# Patient Record
Sex: MALE | Race: WHITE | NOT HISPANIC OR LATINO | Employment: OTHER | URBAN - METROPOLITAN AREA
[De-identification: names, ages, dates, MRNs, and addresses within clinical notes are randomized per-mention and may not be internally consistent; named-entity substitution may affect disease eponyms.]

---

## 2018-01-28 ENCOUNTER — HOSPITAL ENCOUNTER (EMERGENCY)
Facility: HOSPITAL | Age: 38
Discharge: HOME | End: 2018-01-28
Admitting: EMERGENCY MEDICINE
Payer: COMMERCIAL

## 2018-01-28 ENCOUNTER — APPOINTMENT (EMERGENCY)
Dept: CT IMAGING | Facility: HOSPITAL | Age: 38
End: 2018-01-28
Payer: COMMERCIAL

## 2018-01-28 VITALS
HEIGHT: 70 IN | HEART RATE: 89 BPM | DIASTOLIC BLOOD PRESSURE: 71 MMHG | RESPIRATION RATE: 16 BRPM | BODY MASS INDEX: 25.63 KG/M2 | OXYGEN SATURATION: 99 % | TEMPERATURE: 98.7 F | WEIGHT: 179.01 LBS | SYSTOLIC BLOOD PRESSURE: 144 MMHG

## 2018-01-28 DIAGNOSIS — N20.0 RIGHT KIDNEY STONE: Primary | ICD-10-CM

## 2018-01-28 LAB
ALBUMIN SERPL BCP-MCNC: 4.1 G/DL (ref 3.5–5)
ALP SERPL-CCNC: 69 U/L (ref 46–116)
ALT SERPL W P-5'-P-CCNC: 38 U/L (ref 12–78)
ANION GAP SERPL CALCULATED.3IONS-SCNC: 8 MMOL/L (ref 4–13)
APTT PPP: 24 SECONDS (ref 23–35)
AST SERPL W P-5'-P-CCNC: 18 U/L (ref 5–45)
BACTERIA UR QL AUTO: ABNORMAL /HPF
BASOPHILS # BLD AUTO: 0.04 THOUSANDS/ΜL (ref 0–0.1)
BASOPHILS NFR BLD AUTO: 1 % (ref 0–1)
BILIRUB SERPL-MCNC: 0.3 MG/DL (ref 0.2–1)
BILIRUB UR QL STRIP: ABNORMAL
BUN SERPL-MCNC: 14 MG/DL (ref 5–25)
CALCIUM SERPL-MCNC: 9.5 MG/DL (ref 8.3–10.1)
CHLORIDE SERPL-SCNC: 105 MMOL/L (ref 100–108)
CLARITY UR: ABNORMAL
CLARITY, POC: CLEAR
CO2 SERPL-SCNC: 28 MMOL/L (ref 21–32)
COLOR UR: YELLOW
COLOR, POC: NORMAL
CREAT SERPL-MCNC: 1.19 MG/DL (ref 0.6–1.3)
EOSINOPHIL # BLD AUTO: 0.2 THOUSAND/ΜL (ref 0–0.61)
EOSINOPHIL NFR BLD AUTO: 3 % (ref 0–6)
ERYTHROCYTE [DISTWIDTH] IN BLOOD BY AUTOMATED COUNT: 11.8 % (ref 11.6–15.1)
EXT BILIRUBIN, UA: NORMAL
EXT BLOOD URINE: NORMAL
EXT GLUCOSE, UA: NORMAL
EXT KETONES: NORMAL
EXT NITRITE, UA: NORMAL
EXT PH, UA: 5
EXT PROTEIN, UA: NORMAL
EXT SPECIFIC GRAVITY, UA: 1.03
EXT UROBILINOGEN: 0.2
GFR SERPL CREATININE-BSD FRML MDRD: 78 ML/MIN/1.73SQ M
GLUCOSE SERPL-MCNC: 129 MG/DL (ref 65–140)
GLUCOSE UR STRIP-MCNC: NEGATIVE MG/DL
HCT VFR BLD AUTO: 46 % (ref 36.5–49.3)
HGB BLD-MCNC: 15.7 G/DL (ref 12–17)
HGB UR QL STRIP.AUTO: ABNORMAL
INR PPP: 0.96 (ref 0.86–1.16)
KETONES UR STRIP-MCNC: NEGATIVE MG/DL
LEUKOCYTE ESTERASE UR QL STRIP: NEGATIVE
LIPASE SERPL-CCNC: 113 U/L (ref 73–393)
LYMPHOCYTES # BLD AUTO: 1.01 THOUSANDS/ΜL (ref 0.6–4.47)
LYMPHOCYTES NFR BLD AUTO: 16 % (ref 14–44)
MCH RBC QN AUTO: 30.5 PG (ref 26.8–34.3)
MCHC RBC AUTO-ENTMCNC: 34.1 G/DL (ref 31.4–37.4)
MCV RBC AUTO: 89 FL (ref 82–98)
MONOCYTES # BLD AUTO: 0.5 THOUSAND/ΜL (ref 0.17–1.22)
MONOCYTES NFR BLD AUTO: 8 % (ref 4–12)
MUCOUS THREADS UR QL AUTO: ABNORMAL
NEUTROPHILS # BLD AUTO: 4.51 THOUSANDS/ΜL (ref 1.85–7.62)
NEUTS SEG NFR BLD AUTO: 72 % (ref 43–75)
NITRITE UR QL STRIP: NEGATIVE
NON-SQ EPI CELLS URNS QL MICRO: ABNORMAL /HPF
NRBC BLD AUTO-RTO: 0 /100 WBCS
PH UR STRIP.AUTO: 5.5 [PH] (ref 4.5–8)
PLATELET # BLD AUTO: 224 THOUSANDS/UL (ref 149–390)
PMV BLD AUTO: 10 FL (ref 8.9–12.7)
POTASSIUM SERPL-SCNC: 3.6 MMOL/L (ref 3.5–5.3)
PROT SERPL-MCNC: 7.4 G/DL (ref 6.4–8.2)
PROT UR STRIP-MCNC: ABNORMAL MG/DL
PROTHROMBIN TIME: 13 SECONDS (ref 12.1–14.4)
RBC # BLD AUTO: 5.15 MILLION/UL (ref 3.88–5.62)
RBC #/AREA URNS AUTO: ABNORMAL /HPF
SODIUM SERPL-SCNC: 141 MMOL/L (ref 136–145)
SP GR UR STRIP.AUTO: >=1.03 (ref 1–1.03)
UROBILINOGEN UR QL STRIP.AUTO: 0.2 E.U./DL
WBC # BLD AUTO: 6.3 THOUSAND/UL (ref 4.31–10.16)
WBC # BLD EST: NORMAL 10*3/UL
WBC #/AREA URNS AUTO: ABNORMAL /HPF

## 2018-01-28 PROCEDURE — 96374 THER/PROPH/DIAG INJ IV PUSH: CPT

## 2018-01-28 PROCEDURE — 85025 COMPLETE CBC W/AUTO DIFF WBC: CPT

## 2018-01-28 PROCEDURE — 80053 COMPREHEN METABOLIC PANEL: CPT

## 2018-01-28 PROCEDURE — 85610 PROTHROMBIN TIME: CPT | Performed by: PHYSICIAN ASSISTANT

## 2018-01-28 PROCEDURE — 96375 TX/PRO/DX INJ NEW DRUG ADDON: CPT

## 2018-01-28 PROCEDURE — 81002 URINALYSIS NONAUTO W/O SCOPE: CPT

## 2018-01-28 PROCEDURE — 74176 CT ABD & PELVIS W/O CONTRAST: CPT

## 2018-01-28 PROCEDURE — 83690 ASSAY OF LIPASE: CPT

## 2018-01-28 PROCEDURE — 81001 URINALYSIS AUTO W/SCOPE: CPT | Performed by: PHYSICIAN ASSISTANT

## 2018-01-28 PROCEDURE — 36415 COLL VENOUS BLD VENIPUNCTURE: CPT

## 2018-01-28 PROCEDURE — 99284 EMERGENCY DEPT VISIT MOD MDM: CPT

## 2018-01-28 PROCEDURE — 85730 THROMBOPLASTIN TIME PARTIAL: CPT | Performed by: PHYSICIAN ASSISTANT

## 2018-01-28 RX ORDER — KETOROLAC TROMETHAMINE 10 MG/1
10 TABLET, FILM COATED ORAL EVERY 6 HOURS PRN
Qty: 16 TABLET | Refills: 0 | Status: SHIPPED | OUTPATIENT
Start: 2018-01-28 | End: 2018-01-28 | Stop reason: SDUPTHER

## 2018-01-28 RX ORDER — KETOROLAC TROMETHAMINE 30 MG/ML
30 INJECTION, SOLUTION INTRAMUSCULAR; INTRAVENOUS ONCE
Status: COMPLETED | OUTPATIENT
Start: 2018-01-28 | End: 2018-01-28

## 2018-01-28 RX ORDER — TAMSULOSIN HYDROCHLORIDE 0.4 MG/1
0.4 CAPSULE ORAL
Qty: 10 CAPSULE | Refills: 0 | Status: SHIPPED | OUTPATIENT
Start: 2018-01-28 | End: 2021-01-11

## 2018-01-28 RX ORDER — ONDANSETRON 4 MG/1
4 TABLET, FILM COATED ORAL EVERY 6 HOURS
Qty: 12 TABLET | Refills: 0 | Status: SHIPPED | OUTPATIENT
Start: 2018-01-28 | End: 2018-01-28 | Stop reason: SDUPTHER

## 2018-01-28 RX ORDER — OXYCODONE HYDROCHLORIDE AND ACETAMINOPHEN 5; 325 MG/1; MG/1
1 TABLET ORAL EVERY 6 HOURS PRN
Qty: 12 TABLET | Refills: 0 | Status: SHIPPED | OUTPATIENT
Start: 2018-01-28 | End: 2018-01-31

## 2018-01-28 RX ORDER — KETOROLAC TROMETHAMINE 10 MG/1
10 TABLET, FILM COATED ORAL EVERY 6 HOURS PRN
Qty: 16 TABLET | Refills: 0 | Status: SHIPPED | OUTPATIENT
Start: 2018-01-28 | End: 2021-01-11

## 2018-01-28 RX ORDER — TAMSULOSIN HYDROCHLORIDE 0.4 MG/1
0.4 CAPSULE ORAL
Qty: 10 CAPSULE | Refills: 0 | Status: SHIPPED | OUTPATIENT
Start: 2018-01-28 | End: 2018-01-28 | Stop reason: SDUPTHER

## 2018-01-28 RX ORDER — OXYCODONE HYDROCHLORIDE AND ACETAMINOPHEN 5; 325 MG/1; MG/1
1 TABLET ORAL EVERY 6 HOURS PRN
Qty: 10 TABLET | Refills: 0 | Status: SHIPPED | OUTPATIENT
Start: 2018-01-28 | End: 2018-01-28 | Stop reason: SDUPTHER

## 2018-01-28 RX ORDER — ENTACAPONE 200 MG/1
200 TABLET ORAL
COMMUNITY
End: 2021-01-11

## 2018-01-28 RX ORDER — ONDANSETRON 2 MG/ML
4 INJECTION INTRAMUSCULAR; INTRAVENOUS ONCE
Status: COMPLETED | OUTPATIENT
Start: 2018-01-28 | End: 2018-01-28

## 2018-01-28 RX ORDER — HYDROCODONE BITARTRATE AND ACETAMINOPHEN 5; 325 MG/1; MG/1
1 TABLET ORAL ONCE
Status: COMPLETED | OUTPATIENT
Start: 2018-01-28 | End: 2018-01-28

## 2018-01-28 RX ORDER — ONDANSETRON 4 MG/1
4 TABLET, FILM COATED ORAL EVERY 6 HOURS
Qty: 12 TABLET | Refills: 0 | Status: SHIPPED | OUTPATIENT
Start: 2018-01-28 | End: 2021-01-11

## 2018-01-28 RX ADMIN — KETOROLAC TROMETHAMINE 30 MG: 30 INJECTION, SOLUTION INTRAMUSCULAR at 10:13

## 2018-01-28 RX ADMIN — ONDANSETRON 4 MG: 2 INJECTION INTRAMUSCULAR; INTRAVENOUS at 10:14

## 2018-01-28 RX ADMIN — HYDROCODONE BITARTRATE AND ACETAMINOPHEN 1 TABLET: 5; 325 TABLET ORAL at 11:59

## 2018-01-28 NOTE — ED PROVIDER NOTES
History  Chief Complaint   Patient presents with    Abdominal Pain     right lower quadrant pain, sudden onset approx  2 hours ago  Normal bowel movement today, no urinary complaints  Slight nausea after ambulance ride  40year old male with past medical history significant for parkinson's disease presents to ED with chief complaint of abdominal pain  Onset of symptoms reported as 2 hours ago  Location of symptoms is reported as the right lower quadrant of the abdomen  Quality is reported as sharp pain  Severity is reported as moderate  Associated symptoms:  Positive for nausea, denies vomiting, denies diarrhea, denies urinary retention, denies dysuria, hematuria or urinary frequency  Modifiers:  Nothing has been tried for treatment of symptoms  Context: patient reports he is concerned that he may have a kidney stone  He reports he has had kidney stone in the past and symptoms were similar  Patient arrives to ED via EMS, additional information obtained from EMS  Medical summary: review of past visits via [employer] demonstrates no prior visits to this ED  History provided by:  Patient   used: No        Prior to Admission Medications   Prescriptions Last Dose Informant Patient Reported? Taking?   carbidopa-levodopa (SINEMET)  mg per tablet   Yes Yes   Sig: Take 1 tablet by mouth 5 (five) times a day   entacapone (COMTAN) 200 mg tablet   Yes Yes   Sig: Take 200 mg by mouth 5 (five) times a day      Facility-Administered Medications: None       Past Medical History:   Diagnosis Date    Parkinson's disease ([facility] [address] )        History reviewed  No pertinent surgical history  History reviewed  No pertinent family history  I have reviewed and agree with the history as documented      Social History   Substance Use Topics    Smoking status: Current Some Day Smoker     Packs/day: 0 50     Types: Cigarettes    Smokeless tobacco: Never Used    Alcohol use Not on file Comment: friday nights        Review of Systems   Constitutional: Negative for activity change, appetite change, chills, diaphoresis, fatigue, fever and unexpected weight change  HENT: Negative for congestion, dental problem, drooling, ear discharge, ear pain, facial swelling, hearing loss, mouth sores, nosebleeds, postnasal drip, rhinorrhea, sinus pain, sinus pressure, sneezing, sore throat, tinnitus, trouble swallowing and voice change  Eyes: Negative for photophobia, pain, discharge, redness, itching and visual disturbance  Respiratory: Negative for apnea, cough, choking, chest tightness, shortness of breath, wheezing and stridor  Cardiovascular: Negative for chest pain, palpitations and leg swelling  Gastrointestinal: Positive for abdominal pain and nausea  Negative for abdominal distention, anal bleeding, blood in stool, constipation, diarrhea, rectal pain and vomiting  Endocrine: Negative for cold intolerance, heat intolerance, polydipsia, polyphagia and polyuria  Genitourinary: Positive for flank pain  Negative for decreased urine volume, difficulty urinating, discharge, dysuria, frequency, genital sores, hematuria, penile pain, penile swelling, scrotal swelling, testicular pain and urgency  Musculoskeletal: Negative for arthralgias, back pain, gait problem, joint swelling, myalgias, neck pain and neck stiffness  Skin: Negative for color change, pallor, rash and wound  Allergic/Immunologic: Negative for environmental allergies, food allergies and immunocompromised state  Neurological: Negative for dizziness, tremors, seizures, syncope, facial asymmetry, speech difficulty, weakness, light-headedness, numbness and headaches  Hematological: Negative for adenopathy  Does not bruise/bleed easily  Psychiatric/Behavioral: Negative for agitation, confusion and hallucinations  The patient is not nervous/anxious  All other systems reviewed and are negative        Physical Exam  ED Triage Vitals [01/28/18 0937]   Temperature Pulse Respirations Blood Pressure SpO2   98 7 °F (37 1 °C) 80 16 140/92 100 %      Temp Source Heart Rate Source Patient Position - Orthostatic VS BP Location FiO2 (%)   Oral Monitor Lying Right arm --      Pain Score       Worst Possible Pain           Orthostatic Vital Signs  Vitals:    01/28/18 0937 01/28/18 1200   BP: 140/92 144/71   Pulse: 80 89   Patient Position - Orthostatic VS: Lying        Physical Exam   Constitutional: He is oriented to person, place, and time  He appears well-developed and well-nourished  No distress  /71   Pulse 89   Temp 98 7 °F (37 1 °C) (Oral)   Resp 16   Ht 5' 10" (1 778 m)   Wt 81 2 kg (179 lb 0 2 oz)   SpO2 99%   BMI 25 69 kg/m²    HENT:   Head: Normocephalic and atraumatic  Right Ear: External ear normal    Left Ear: External ear normal    Nose: Nose normal    Mouth/Throat: Oropharynx is clear and moist  No oropharyngeal exudate  Eyes: Conjunctivae and EOM are normal  Pupils are equal, round, and reactive to light  Right eye exhibits no discharge  Left eye exhibits no discharge  No scleral icterus  Neck: Normal range of motion  Neck supple  No tracheal deviation present  No thyromegaly present  Cardiovascular: Normal rate, regular rhythm, S1 normal, S2 normal and intact distal pulses  Pulmonary/Chest: Effort normal and breath sounds normal  No stridor  No respiratory distress  He has no wheezes  He has no rales  He exhibits no tenderness  Abdominal: Soft  Bowel sounds are normal  He exhibits no distension and no mass  There is tenderness  There is no rebound and no guarding  No hernia    + RLQ tenderness to palpation  No rigidity or guarding  No pulsatile masses  Musculoskeletal: Normal range of motion  He exhibits no edema, tenderness or deformity  Lymphadenopathy:     He has no cervical adenopathy  Neurological: He is alert and oriented to person, place, and time  He displays normal reflexes   No cranial nerve deficit or sensory deficit  He exhibits normal muscle tone  Coordination normal    Skin: Skin is warm and dry  Capillary refill takes less than 2 seconds  No rash noted  He is not diaphoretic  No erythema  No pallor  Psychiatric: He has a normal mood and affect  His behavior is normal  Judgment and thought content normal    Nursing note and vitals reviewed  ED Medications  Medications   ketorolac (TORADOL) injection 30 mg (30 mg Intravenous Given 1/28/18 1013)   ondansetron (ZOFRAN) injection 4 mg (4 mg Intravenous Given 1/28/18 1014)   HYDROcodone-acetaminophen (NORCO) 5-325 mg per tablet 1 tablet (1 tablet Oral Given 1/28/18 1159)       Diagnostic Studies  Results Reviewed     Procedure Component Value Units Date/Time    Urine Microscopic [[id number]]  (Abnormal) Collected:  01/28/18 1017    Lab Status:  Final result Specimen:  Urine from Urine, Clean Catch Updated:  01/28/18 1033     RBC, UA Innumerable (A) /hpf      WBC, UA 2-4 (A) /hpf      Epithelial Cells None Seen /hpf      Bacteria, UA Occasional /hpf      MUCOUS THREADS Occasional    Protime-INR [[id number]]  (Normal) Collected:  01/28/18 1017    Lab Status:  Final result Specimen:  Blood from Arm, Right Updated:  01/28/18 1030     Protime 13 0 seconds      INR 0 96    APTT [[id number]]  (Normal) Collected:  01/28/18 1017    Lab Status:  Final result Specimen:  Blood from Arm, Right Updated:  01/28/18 1030     PTT 24 seconds     Narrative:          Therapeutic Heparin Range = 60-90 seconds    UA w Reflex to Microscopic w Reflex to Culture [[id number]]  (Abnormal) Collected:  01/28/18 1017    Lab Status:  Final result Specimen:  Urine from Urine, Clean Catch Updated:  01/28/18 1026     Color, UA Yellow     Clarity, UA Cloudy     Specific Gravity, UA >=1 030     pH, UA 5 5     Leukocytes, UA Negative     Nitrite, UA Negative     Protein,  (2+) (A) mg/dl      Glucose, UA Negative mg/dl      Ketones, UA Negative mg/dl      Urobilinogen, UA 0 2 E U /dl      Bilirubin, UA Small (A)     Blood, UA Large (A)    Comprehensive metabolic panel [[id number]] Collected:  01/28/18 0947    Lab Status:  Final result Specimen:  Blood from Arm, Right Updated:  01/28/18 1010     Sodium 141 mmol/L      Potassium 3 6 mmol/L      Chloride 105 mmol/L      CO2 28 mmol/L      Anion Gap 8 mmol/L      BUN 14 mg/dL      Creatinine 1 19 mg/dL      Glucose 129 mg/dL      Calcium 9 5 mg/dL      AST 18 U/L      ALT 38 U/L      Alkaline Phosphatase 69 U/L      Total Protein 7 4 g/dL      Albumin 4 1 g/dL      Total Bilirubin 0 30 mg/dL      eGFR 78 ml/min/1 73sq m     Narrative:         National Kidney Disease Education Program recommendations are as follows:  GFR calculation is accurate only with a steady state creatinine  Chronic Kidney disease less than 60 ml/min/1 73 sq  meters  Kidney failure less than 15 ml/min/1 73 sq  meters      Lipase [[id number]]  (Normal) Collected:  01/28/18 0947    Lab Status:  Final result Specimen:  Blood from Arm, Right Updated:  01/28/18 1010     Lipase 113 u/L     POCT urinalysis dipstick [[id number]]  (Normal) Resulted:  01/28/18 1007    Lab Status:  Final result Specimen:  Urine from Urine, Other Updated:  01/28/18 1008     Color, UA shantell     Clarity, UA clear     EXT Glucose, UA neg     EXT Bilirubin, UA (Ref: Negative) neg     EXT Ketones, UA (Ref: Negative) neg     EXT Spec Grav, UA 1 030     EXT Blood, UA (Ref: Negative) large     EXT pH, UA 5 0     EXT Protein, UA (Ref: Negative) 30+     EXT Urobilinogen, UA (Ref: 0 2- 1 0) 0 2     EXT Leukocytes, UA (Ref: Negative) trace     EXT Nitrite, UA (Ref: Negative) neg    CBC and differential [[id number]]  (Normal) Collected:  01/28/18 0947    Lab Status:  Final result Specimen:  Blood from Arm, Right Updated:  01/28/18 0954     WBC 6 30 Thousand/uL      RBC 5 15 Million/uL      Hemoglobin 15 7 g/dL      Hematocrit 46 0 %      MCV 89 fL      MCH 30 5 pg      MCHC 34 1 g/dL      RDW 11 8 %      MPV 10 0 fL Platelets 210 Thousands/uL      nRBC 0 /100 WBCs      Neutrophils Relative 72 %      Lymphocytes Relative 16 %      Monocytes Relative 8 %      Eosinophils Relative 3 %      Basophils Relative 1 %      Neutrophils Absolute 4 51 Thousands/µL      Lymphocytes Absolute 1 01 Thousands/µL      Monocytes Absolute 0 50 Thousand/µL      Eosinophils Absolute 0 20 Thousand/µL      Basophils Absolute 0 04 Thousands/µL                  CT renal stone study abdomen pelvis without contrast   Final Result by Rommel Trejo MD (01/28 1059)      Mildly obstructing 2 mm calculus at the right ureterovesical junction  Workstation performed: [id number]                    Procedures  Procedures       Phone Contacts  ED Phone Contact    ED Course  ED Course                                MDM  Number of Diagnoses or Management Options  Right kidney stone: new and requires workup  Diagnosis management comments: Differential diagnosis includes but is not limited to appendicitis, diverticulitis, gastroenteritis, gastritis, cholecystitis, pancreatitis, mesenteric adenitis, kidney stone, pyelonephritis, UTI  Plan workup including labs, ct scan abd/pelvis    Lab results reviewed:  inr normal at 0 96   UA remarkable for large blood, negative nitrites, negative leukocytes  Small bilirubin  CBC reviewed remarkable for normal WBC, hgb and hct  cmp reviewed and unremarkable  Lipase normal at 113  Ct renal stone study images visualized by me  Radiology report reviewed: RIGHT KIDNEY AND URETER:  There is a 2 mm calculus in the distal ureter at the uterovesical junction, causing mild hydroureteronephrosis, with minimal perinephric stranding  LEFT KIDNEY AND URETER:  There is a 2 mm calculus in the lower pole of the kidney  No hydronephrosis or hydroureter   No perinephric collection  URINARY BLADDER:  Bladder is decompressed, limiting evaluation   No calculi identified      No significant abnormality in the visualized lung bases  Limited low radiation dose noncontrast CT evaluation demonstrates no clinically significant abnormality of liver, spleen, pancreas, or adrenal glands  No calcified gallstones or gallbladder wall thickening noted  No ascites or lymphadenopathy  Bowel loops appear unremarkable  Limited evaluation demonstrates no evidence to suggest acute appendicitis  There is a small fat-containing umbilical hernia  No acute fracture or destructive osseous lesion is identified  I discussed ct scan results with patient at bedside  Discussed 2 mm Right distal ureteral stone  Discussed with patient there is also a 2mm left stone in lower pole of kidney  Patient reports his pain is improved after treatment in ed  Discussed diagnosis of kidney stone  Patient is able to pass urine in ed  Discussed outpatient follow up with pcp and urologist,  Strain urine, discussed use of toradol and zofran at home  Add percocet for severe pain  Standard narcotic precautions given  Reviewed reasons to return to ed  Patient verbalized understanding of diagnosis and agreement with discharge plan of care as well as understanding of reasons to return to ed            Amount and/or Complexity of Data Reviewed  Clinical lab tests: ordered and reviewed  Tests in the radiology section of CPT®: ordered and reviewed  Discussion of test results with the performing providers: yes  Obtain history from someone other than the patient: yes (Family members at bedside  )  Review and summarize past medical records: yes  Independent visualization of images, tracings, or specimens: yes    Patient Progress  Patient progress: improved    CritCare Time    Disposition  Final diagnoses:   Right kidney stone     Time reflects when diagnosis was documented in both MDM as applicable and the Disposition within this note     Time User Action Codes Description Comment    1/28/2018 11:50 AM Deangelo Gaitan Add [N20 0] Right kidney stone       ED Disposition     ED Disposition Condition Comment    Discharge  Alyssa Dias discharge to home/self care  Condition at discharge: Stable        Follow-up Information     Follow up With Specialties Details Why Contact Info Additional Information    Dolly Acuna MD Family Medicine Call in 1 day for further evaluation of symptoms [address]       Saintclair Lah, MD Urology Call in 2 days for further evaluation of symptoms [address]  [phone]       [address] [facility] Emergency Department Emergency Medicine Go to If symptoms worsen [address] [facility] [address]  [phone] [place] ED, [address] [place] [address], [facility]        Discharge Medication List as of 1/28/2018 11:53 AM      START taking these medications    Details   ketorolac (TORADOL) 10 mg tablet Take 1 tablet (10 mg total) by mouth every 6 (six) hours as needed for moderate pain, Starting Sun 1/28/2018, Normal      ondansetron (ZOFRAN) 4 mg tablet Take 1 tablet (4 mg total) by mouth every 6 (six) hours, Starting Sun 1/28/2018, Normal      oxyCODONE-acetaminophen (PERCOCET) 5-325 mg per tablet Take 1 tablet by mouth every 6 (six) hours as needed for moderate pain (kidney stone/initial rx) for up to 10 days Max Daily Amount: 4 tablets, Starting Sun 1/28/2018, Until Wed 2/7/2018, Print      tamsulosin (FLOMAX) 0 4 mg Take 1 capsule (0 4 mg total) by mouth daily with dinner for 10 days, Starting Sun 1/28/2018, Until Wed 2/7/2018, Normal         CONTINUE these medications which have NOT CHANGED    Details   carbidopa-levodopa (SINEMET)  mg per tablet Take 1 tablet by mouth 5 (five) times a day, Historical Med      entacapone (COMTAN) 200 mg tablet Take 200 mg by mouth 5 (five) times a day, Historical Med           No discharge procedures on file      ED Provider  Electronically Signed by           Jos Ervin PA-C  01/31/18 5482

## 2018-01-28 NOTE — DISCHARGE INSTRUCTIONS
Kidney Stones   WHAT YOU NEED TO KNOW:   Kidney stones form in the urinary system when the water and waste in your urine are out of balance  When this happens, certain types of waste crystals separate from the urine  The crystals build up and form kidney stones  You may have 1 or more kidney stones  DISCHARGE INSTRUCTIONS:   Return to the emergency department if:   · You have vomiting that is not relieved by medicine  Contact your healthcare provider if:   · You have a fever  · You have trouble passing urine  · You see blood in your urine  · You have severe pain  · You have any questions or concerns about your condition or care  Medicines:   · NSAIDs , such as ibuprofen, help decrease swelling, pain, and fever  This medicine is available with or without a doctor's order  NSAIDs can cause stomach bleeding or kidney problems in certain people  If you take blood thinner medicine, always ask your healthcare provider if NSAIDs are safe for you  Always read the medicine label and follow directions  · Prescription medicine  may be given  Ask how to take this medicine safely  · Medicines  to balance your electrolytes may be needed  · Take your medicine as directed  Contact your healthcare provider if you think your medicine is not helping or if you have side effects  Tell him or her if you are allergic to any medicine  Keep a list of the medicines, vitamins, and herbs you take  Include the amounts, and when and why you take them  Bring the list or the pill bottles to follow-up visits  Carry your medicine list with you in case of an emergency  Follow up with your healthcare provider as directed: You may need to return for more tests  Write down your questions so you remember to ask them during your visits  Self-care:   · Drink plenty of liquids  Your healthcare provider may tell you to drink at least 8 to 12 (eight-ounce) cups of liquids each day   This helps flush out the kidney stones when you urinate  Water is the best liquid to drink  · Strain your urine every time you go to the bathroom  Urinate through a strainer or a piece of thin cloth to catch the stones  Take the stones to your healthcare provider so they can be sent to the lab for tests  This will help your healthcare providers plan the best treatment for you  · Eat a variety of healthy foods  Healthy foods include fruits, vegetables, whole-grain breads, low-fat dairy products, beans, and fish  You may need to limit how much sodium (salt) or protein you eat  Ask for information about the best foods for you  · Stay active  Your stones may pass more easily by if you stay active  Ask about the best activities for you  After you pass your kidney stones:  Once you have passed your kidney stones, your healthcare provider may  order a 24-hour urine test  Results from a 24-hour urine test will help your healthcare provider plan ways to prevent more stones from forming  If you are told to do a 24-hour test, your healthcare provider will give you more instructions  © 2017 [address] [facility] Information is for End User's use only and may not be sold, redistributed or otherwise used for commercial purposes  All illustrations and images included in CareNotes® are the copyrighted property of A D A M , Inc  or Alexander Kamran  The above information is an  only  It is not intended as medical advice for individual conditions or treatments  Talk to your doctor, nurse or pharmacist before following any medical regimen to see if it is safe and effective for you  How to Strain Your Urine   WHAT YOU NEED TO KNOW:   Urinate into a strainer (funnel with a fine mesh on the bottom) or glass jar to collect kidney stones  DISCHARGE INSTRUCTIONS:   Medicines:   · Pain medicine: You may be given medicine to take away or decrease pain   Do not wait until the pain is severe before you take your medicine  · NSAIDs:  These medicines decrease swelling, pain, and fever  NSAIDs are available without a doctor's order  Ask which medicine is right for you  Ask how much to take and when to take it  Take as directed  NSAIDs can cause stomach bleeding and kidney problems if not taken correctly  · Nausea medicine: This medicine calms your stomach and prevents or controls vomiting  · Take your medicine as directed  Contact your healthcare provider if you think your medicine is not helping or if you have side effects  Tell him of her if you are allergic to any medicine  Keep a list of the medicines, vitamins, and herbs you take  Include the amounts, and when and why you take them  Bring the list or the pill bottles to follow-up visits  Carry your medicine list with you in case of an emergency  Drink liquids as directed:  Drink about 3 liters of liquids each day, or as directed  That equals about 12 glasses of water or fruit juice  Half of your total daily liquids should be water  Limit coffee, tea, and soda to 2 cups daily  Your urine will be pale and clear if you are drinking enough liquid  Self-care:   · Activity:  Exercise, such as walking, may help decrease your pain  · Avoid heat:  Heat may cause you to sweat, urinate less, and become dehydrated  Follow up with your healthcare provider or urologist as directed:  Write down your questions so you remember to ask them during your visits  Contact your healthcare provider or urologist if:   · You have a fever and chills  · Your urine looks cloudy or has a bad smell  · You have burning pain when you urinate  · You have trouble urinating  · You are vomiting and it does not get better, even after you take medicine  · You have questions or concerns about your condition or care  Return to the emergency department if:   · You are not able to urinate  · You have severe pain in your lower abdomen or side      · Your heart flutters or beats faster than usual   © 2017 [address] [facility] Information is for End User's use only and may not be sold, redistributed or otherwise used for commercial purposes  All illustrations and images included in CareNotes® are the copyrighted property of A D A M , Inc  or Alexander Andrew  The above information is an  only  It is not intended as medical advice for individual conditions or treatments  Talk to your doctor, nurse or pharmacist before following any medical regimen to see if it is safe and effective for you

## 2018-01-28 NOTE — ED NOTES
Patient verbalizes understanding of DCI and followup care  Ambulatory off unit without assistance  Family driving home        Marcos Manjarrez RN  01/28/18 4567

## 2021-01-11 ENCOUNTER — OFFICE VISIT (OUTPATIENT)
Dept: FAMILY MEDICINE CLINIC | Facility: CLINIC | Age: 41
End: 2021-01-11
Payer: COMMERCIAL

## 2021-01-11 VITALS
SYSTOLIC BLOOD PRESSURE: 118 MMHG | DIASTOLIC BLOOD PRESSURE: 76 MMHG | WEIGHT: 184.2 LBS | BODY MASS INDEX: 26.37 KG/M2 | TEMPERATURE: 97.8 F | HEIGHT: 70 IN | OXYGEN SATURATION: 99 % | HEART RATE: 110 BPM

## 2021-01-11 DIAGNOSIS — F17.200 CURRENT EVERY DAY SMOKER: ICD-10-CM

## 2021-01-11 DIAGNOSIS — Z13.220 SCREENING, LIPID: ICD-10-CM

## 2021-01-11 DIAGNOSIS — L98.9 SKIN LESION: ICD-10-CM

## 2021-01-11 DIAGNOSIS — G20 PARKINSON'S DISEASE (HCC): ICD-10-CM

## 2021-01-11 DIAGNOSIS — R53.83 OTHER FATIGUE: ICD-10-CM

## 2021-01-11 DIAGNOSIS — Z00.00 ENCOUNTER FOR MEDICAL EXAMINATION TO ESTABLISH CARE: ICD-10-CM

## 2021-01-11 DIAGNOSIS — Z11.4 SCREENING FOR HIV (HUMAN IMMUNODEFICIENCY VIRUS): ICD-10-CM

## 2021-01-11 PROBLEM — G20.A1 PARKINSON'S DISEASE: Status: ACTIVE | Noted: 2021-01-11

## 2021-01-11 PROCEDURE — 3008F BODY MASS INDEX DOCD: CPT | Performed by: FAMILY MEDICINE

## 2021-01-11 PROCEDURE — 99204 OFFICE O/P NEW MOD 45 MIN: CPT | Performed by: FAMILY MEDICINE

## 2021-01-11 PROCEDURE — 3725F SCREEN DEPRESSION PERFORMED: CPT | Performed by: FAMILY MEDICINE

## 2021-01-11 RX ORDER — CARBIDOPA, LEVODOPA AND ENTACAPONE 50; 200; 200 MG/1; MG/1; MG/1
1 TABLET, FILM COATED ORAL
COMMUNITY
Start: 2020-12-10 | End: 2021-03-22 | Stop reason: RX

## 2021-01-11 RX ORDER — HYDROXYZINE 50 MG/1
50 TABLET, FILM COATED ORAL EVERY EVENING
COMMUNITY
Start: 2021-01-06

## 2021-01-11 NOTE — PROGRESS NOTES
Assessment/Plan:     Chronic Problems:  Park[facility]'s disease ([facility] ) - early onset   Advised to keep the follow-up with his neurologist  Stay on current meds  Current every day smoker  Pt is not ready to work on smoking cessation, but will let me know  Plans on stopping this year, maybe by June per pt  Also refuses flu shot and pneumonia shot  Visit Diagnosis:  Diagnoses and all orders for this visit:    BMI 26 0-26 9,adult    Encounter for medical examination to establish care  Comments: Will get labs and discuss at f/u appt  Parkinson's disease ([facility] ) - early onset    Current every day smoker    Skin lesion  Comments: Will arrange removal as pt states this lesion has bled on and off since September  No further picking  Screening, lipid  -     Comprehensive metabolic panel; Future  -     Lipid panel; Future  -     Microalbumin / creatinine urine ratio  -     Urinalysis with microscopic    Other fatigue  -     TSH, 3rd generation with Free T4 reflex; Future  -     CBC and differential; Future    Screening for HIV (human immunodeficiency virus)  Comments:  Pt accepted screening for hiv  Orders:  -     HIV 1/2 Antigen/Antibody (4th Generation) w Reflex [facility]; Future    Other orders  -     hydrOXYzine HCL (ATARAX) 50 mg tablet; Take 50 mg by mouth every evening  -     carbidopa-levodopa-entacapone (STALEVO) -200 MG per tablet; 1 tablet 5 (five) times a day          Subjective:    Patient ID: Akhil Perez  is a 45 y o  male  Patient is here to today to establish care  He was known to me from another practice  He has not had a primary for at least the last 4 years  He does follow up consistently with his neurologist, Dr Jin Rajan, from [facility] for early onset Parkinson  Feels he is doing well, just on  A lot of meds  Now on Stalevo  Feels he is doing well on this drug, just tired a lot  Was on provigil but that is not helping and the meds are not covered   Takes atarax to help him sleep at night or he would be up every hour  Pt also would like me to look at a lesion on his right calf that he has had since September  Feels the lesion has changed since September  Sometimes this lesion breaks open and will return and heal, but will come back  Also gets lesions on the fingers like this but they heal and resolve  Takes all other meds as directed  No side effects noted  The following portions of the patient's history were reviewed and updated as appropriate: allergies, current medications, past family history, past medical history, past social history, past surgical history and problem list     Review of Systems   Constitutional: Positive for fatigue  Negative for chills, diaphoresis and fever  HENT: Negative  Eyes: Negative  Respiratory: Negative for cough, shortness of breath and wheezing  Pt is still a smoker about 1/2 ppd  Cardiovascular: Negative for chest pain and palpitations  Gastrointestinal: Positive for constipation  Negative for abdominal pain, diarrhea, nausea and vomiting  Abdominal distention: Has stiffness from the Parkinsons  Genitourinary: Positive for urgency (but has had a h/o kidney stones  Last episode July 2018)  Negative for dysuria and frequency  Musculoskeletal: Positive for gait problem (at times  )  Negative for arthralgias and myalgias  Neurological: Positive for tremors (Pt reports resting tremors  ) and headaches  Negative for dizziness and light-headedness  Psychiatric/Behavioral: Positive for sleep disturbance (yes unless he takes atarax  )  Negative for dysphoric mood  The patient is not nervous/anxious            /76   Pulse (!) 110   Temp 97 8 °F (36 6 °C)   Ht 5' 10" (1 778 m)   Wt 83 6 kg (184 lb 3 2 oz)   SpO2 99%   BMI 26 43 kg/m²   Social History     Socioeconomic History    Marital status: /Civil Union     Spouse name: Not on file    Number of children: Not on file    Years of education: Not on file    Highest education level: Not on file   Occupational History    Not on file   Social Needs    Financial resource strain: Not on file    Food insecurity     Worry: Not on file     Inability: Not on file    Transportation needs     Medical: Not on file     Non-medical: Not on file   Tobacco Use    Smoking status: Current Some Day Smoker     Packs/day: 0 50     Types: Cigarettes    Smokeless tobacco: Never Used   Substance and Sexual Activity    Alcohol use: Not on file     Comment: friday nights    Drug use: No    Sexual activity: Not on file   Lifestyle    Physical activity     Days per week: Not on file     Minutes per session: Not on file    Stress: Not on file   Relationships    Social connections     Talks on phone: Not on file     Gets together: Not on file     Attends [employer] service: Not on file     Active member of club or organization: Not on file     Attends meetings of clubs or organizations: Not on file     Relationship status: Not on file    Intimate partner violence     Fear of current or ex partner: Not on file     Emotionally abused: Not on file     Physically abused: Not on file     Forced sexual activity: Not on file   Other Topics Concern    Not on file   Social History Narrative    Not on file     Past Medical History:   Diagnosis Date    Parkinson's disease [facility])      History reviewed  No pertinent family history  History reviewed  No pertinent surgical history  Current Outpatient Medications:     carbidopa-levodopa-entacapone (STALEVO) -200 MG per tablet, 1 tablet 5 (five) times a day, Disp: , Rfl:     hydrOXYzine HCL (ATARAX) 50 mg tablet, Take 50 mg by mouth every evening, Disp: , Rfl:     No Known Allergies       Lab Review   No visits with results within 2 Month(s) from this visit     Latest known visit with results is:   Admission on 01/28/2018, Discharged on 01/28/2018   Component Date Value    WBC 01/28/2018 6 30     RBC 01/28/2018 5 15     Hemoglobin 01/28/2018 15 7     Hematocrit 01/28/2018 46 0     MCV 01/28/2018 89     MCH 01/28/2018 30 5     MCHC 01/28/2018 34 1     RDW 01/28/2018 11 8     MPV 01/28/2018 10 0     Platelets 57/82/3919 224     nRBC 01/28/2018 0     Neutrophils Relative 01/28/2018 72     Lymphocytes Relative 01/28/2018 16     Monocytes Relative 01/28/2018 8     Eosinophils Relative 01/28/2018 3     Basophils Relative 01/28/2018 1     Neutrophils Absolute 01/28/2018 4 51     Lymphocytes Absolute 01/28/2018 1 01     Monocytes Absolute 01/28/2018 0 50     Eosinophils Absolute 01/28/2018 0 20     Basophils Absolute 01/28/2018 0 04     Sodium 01/28/2018 141     Potassium 01/28/2018 3 6     Chloride 01/28/2018 105     CO2 01/28/2018 28     ANION GAP 01/28/2018 8     BUN 01/28/2018 14     Creatinine 01/28/2018 1 19     Glucose 01/28/2018 129     Calcium 01/28/2018 9 5     AST 01/28/2018 18     ALT 01/28/2018 38     Alkaline Phosphatase 01/28/2018 69     Total Protein 01/28/2018 7 4     Albumin 01/28/2018 4 1     Total Bilirubin 01/28/2018 0 30     eGFR 01/28/2018 78     Lipase 01/28/2018 113     Color, UA 01/28/2018 shantell     Clarity, UA 01/28/2018 clear     Glucose, UA (Ref: Negati* 01/28/2018 neg     Bilirubin, UA (Ref: Nega* 01/28/2018 neg     Ketones, UA (Ref: Negati* 01/28/2018 neg     Spec Grav, UA (Ref:1 003* 01/28/2018 1 030     Blood, UA (Ref: Negative) 01/28/2018 large     pH, UA (Ref: 4 5-8 0) 01/28/2018 5 0     Protein, UA (Ref: Negati* 01/28/2018 30+     Urobilinogen, UA (Ref: 0* 01/28/2018 0 2      Leukocytes, UA (Ref: Ne* 01/28/2018 trace     Nitrite, UA (Ref: Negati* 01/28/2018 neg     Protime 01/28/2018 13 0     INR 01/28/2018 0 96     PTT 01/28/2018 24     Color, UA 01/28/2018 Yellow     Clarity, UA 01/28/2018 Cloudy     Specific Gravity, UA 01/28/2018 >=1 030     pH, UA 01/28/2018 5 5     Leukocytes, UA 01/28/2018 Negative     Nitrite, UA 01/28/2018 Negative     Protein, UA 01/28/2018 100 (2+)*    Glucose, UA 01/28/2018 Negative     Ketones, UA 01/28/2018 Negative     Urobilinogen, UA 01/28/2018 0 2     Bilirubin, UA 01/28/2018 Small*    Blood, UA 01/28/2018 Large*    RBC, UA 01/28/2018 Innumerable*    WBC, UA 01/28/2018 2-4*    Epithelial Cells 01/28/2018 None Seen     Bacteria, UA 01/28/2018 Occasional     MUCUS THREADS 01/28/2018 Occasional         Imaging: No results found  Objective:     Physical Exam  Vitals signs and nursing note reviewed  Constitutional:       General: He is not in acute distress  Appearance: Normal appearance  He is well-developed  He is not ill-appearing, toxic-appearing or diaphoretic  HENT:      Head: Normocephalic and atraumatic  Right Ear: Tympanic membrane, ear canal and external ear normal       Left Ear: Tympanic membrane, ear canal and external ear normal       Nose: Nose normal  No rhinorrhea  Mouth/Throat:      Mouth: Mucous membranes are moist       Pharynx: No posterior oropharyngeal erythema  Eyes:      General:         Right eye: No discharge  Left eye: No discharge  Conjunctiva/sclera: Conjunctivae normal       Pupils: Pupils are equal, round, and reactive to light  Neck:      Musculoskeletal: Normal range of motion  Thyroid: No thyromegaly  Cardiovascular:      Rate and Rhythm: Normal rate and regular rhythm  Heart sounds: Normal heart sounds  No murmur  Pulmonary:      Effort: Pulmonary effort is normal  No respiratory distress  Breath sounds: No wheezing or rales  Abdominal:      General: Bowel sounds are normal       Palpations: Abdomen is soft  Tenderness: There is no abdominal tenderness  Musculoskeletal: Normal range of motion  General: No tenderness or deformity  Lymphadenopathy:      Cervical: No cervical adenopathy  Skin:     General: Skin is warm and dry        Comments: 3 mm lesion on the right calf raised which appears to have a white head  Pt states he did pick this in the shower  , but this lesion has bled on and off since September  Neurological:      Mental Status: He is alert and oriented to person, place, and time  Cranial Nerves: No cranial nerve deficit  Comments: + tremor noted, resting and intentional, but very mild  Psychiatric:         Behavior: Behavior normal          Thought Content: Thought content normal          Judgment: Judgment normal            Patient Instructions      Reviewed all with patient  Keep the follow-up with your neurologist   Have the labs done fasting but drink water before you go for the test   If the new labs are normal we will discuss all at follow-up appointment in about 2 weeks when I will do your Medicare wellness and also remove that lesion from your right calf  I would like you to consider stopping smoking and may be at this point you could cut down by 1 cigarette but your wife is the problem  It is very difficult for 1 person to stop smoking if the partners smoking as well  Will discuss that with her as well  Patient is refusing the flu shot the pneumonia shot and prefers not to get the COVID vaccine  Low Fat Diet   AMBULATORY CARE:   A low-fat diet  is an eating plan that is low in total fat, unhealthy fat, and cholesterol  You may need to follow a low-fat diet if you have trouble digesting or absorbing fat  You may also need to follow this diet if you have high cholesterol  You can also lower your cholesterol by increasing the amount of fiber in your diet  Soluble fiber is a type of fiber that helps to decrease cholesterol levels  Different types of fat in food:   · Limit unhealthy fats  A diet that is high in cholesterol, saturated fat, and trans fat may cause unhealthy cholesterol levels  Unhealthy cholesterol levels increase your risk of heart disease  ? Cholesterol:  Limit intake of cholesterol to less than 200 mg per day   Cholesterol is found in meat, eggs, and dairy     ? Saturated fat:  Limit saturated fat to less than 7% of your total daily calories  Ask your dietitian how many calories you need each day  Saturated fat is found in butter, cheese, ice cream, whole milk, and palm oil  Saturated fat is also found in meat, such as beef, pork, chicken skin, and processed meats  Processed meats include sausage, hot dogs, and bologna  ? Trans fat:  Avoid trans fat as much as possible  Trans fat is used in fried and baked foods  Foods that say trans fat free on the label may still have up to 0 5 grams of trans fat per serving  · Include healthy fats  Replace foods that are high in saturated and trans fat with foods high in healthy fats  This may help to decrease high cholesterol levels  ? Monounsaturated fats: These are found in avocados, nuts, and vegetable oils, such as olive, canola, and sunflower oil  ? Polyunsaturated fats: These can be found in vegetable oils, such as soybean or corn oil  Omega-3 fats can help to decrease the risk of heart disease  Omega-3 fats are found in fish, such as salmon, herring, trout, and tuna  Omega-3 fats can also be found in plant foods, such as walnuts, flaxseed, soybeans, and canola oil  Foods to limit or avoid:   · Grains:      ? Snacks that are made with partially hydrogenated oils, such as chips, regular crackers, and butter-flavored popcorn    ? High-fat baked goods, such as biscuits, croissants, doughnuts, pies, cookies, and pastries    · Dairy:      ? Whole milk, 2% milk, and yogurt and ice cream made with whole milk    ? Half and half creamer, heavy cream, and whipping cream    ? Cheese, cream cheese, and sour cream    · Meats and proteins:      ? High-fat cuts of meat (T-bone steak, regular hamburger, and ribs)    ? Fried meat, poultry (turkey and chicken), and fish    ? Poultry (chicken and turkey) with skin    ? Cold cuts (salami or bologna), hot dogs, schmitz, and sausage    ?  Whole eggs and egg yolks    · Vegetables and fruits with added fat:      ? Fried vegetables or vegetables in butter or high-fat sauces, such as cream or cheese sauces    ? Fried fruit or fruit served with butter or cream    · Fats:      ? Butter, stick margarine, and shortening    ? Coconut, palm oil, and palm kernel oil    Foods to include:   · Grains:      ? Whole-grain breads, cereals, pasta, and brown rice    ? Low-fat crackers and pretzels    · Vegetables and fruits:      ? Fresh, frozen, or canned vegetables (no salt or low-sodium)    ? Fresh, frozen, dried, or canned fruit (canned in light syrup or fruit juice)    ? Avocado    · Low-fat dairy products:      ? Nonfat (skim) or 1% milk    ? Nonfat or low-fat cheese, yogurt, and cottage cheese    · Meats and proteins:      ? Chicken or turkey with no skin    ? Baked or broiled fish    ? Lean beef and pork (loin, round, extra lean hamburger)    ? Beans and peas, unsalted nuts, soy products    ? Egg whites and substitutes    ? Seeds and nuts    · Fats:      ? Unsaturated oil, such as canola, olive, peanut, soybean, or sunflower oil    ? Soft or liquid margarine and vegetable oil spread    ? Low-fat salad dressing    Other ways to decrease fat:   · Read food labels before you buy foods  Choose foods that have less than 30% of calories from fat  Choose low-fat or fat-free dairy products  Remember that fat free does not mean calorie free  These foods still contain calories, and too many calories can lead to weight gain  · Trim fat from meat and avoid fried food  Trim all visible fat from meat before you cook it  Remove the skin from poultry  Do not shearer meat, fish, or poultry  Bake, roast, boil, or broil these foods instead  Avoid fried foods  Eat a baked potato instead of [place] fries  Steam vegetables instead of sautéing them in butter  · Add less fat to foods  Use imitation schmitz bits on salads and baked potatoes instead of regular schmitz bits   Use fat-free or low-fat salad dressings instead of regular dressings  Use low-fat or nonfat butter-flavored topping instead of regular butter or margarine on popcorn and other foods  Ways to decrease fat in recipes:  Replace high-fat ingredients with low-fat or nonfat ones  This may cause baked goods to be drier than usual  You may need to use nonfat cooking spray on pans to prevent food from sticking  You also may need to change the amount of other ingredients, such as water, in the recipe  Try the following:  · Use low-fat or light margarine instead of regular margarine or shortening  · Use lean ground turkey breast or chicken, or lean ground beef (less than 5% fat) instead of hamburger  · Add 1 teaspoon of canola oil to 8 ounces of skim milk instead of using cream or half and half  · Use grated zucchini, carrots, or apples in breads instead of coconut  · Use blenderized, low-fat cottage cheese, plain tofu, or low-fat ricotta cheese instead of cream cheese  · Use 1 egg white and 1 teaspoon of canola oil, or use ¼ cup (2 ounces) of fat-free egg substitute instead of a whole egg  · Replace half of the oil that is called for in a recipe with applesauce when you bake  Use 3 tablespoons of cocoa powder and 1 tablespoon of canola oil instead of a square of baking chocolate  How to increase fiber:  Eat enough high-fiber foods to get 20 to 30 grams of fiber every day  Slowly increase your fiber intake to avoid stomach cramps, gas, and other problems  · Eat 3 ounces of whole-grain foods each day  An ounce is about 1 slice of bread  Eat whole-grain breads, such as whole-wheat bread  Whole wheat, whole-wheat flour, or other whole grains should be listed as the first ingredient on the food label  Replace white flour with whole-grain flour or use half of each in recipes  Whole-grain flour is heavier than white flour, so you may have to add more yeast or baking powder       · Eat a high-fiber cereal for breakfast   Marco A Cano is a good source of soluble fiber  Look for cereals that have bran or fiber in the name  Choose whole-grain products, such as brown rice, barley, and whole-wheat pasta  · Eat more beans, peas, and lentils  For example, add beans to soups or salads  Eat at least 5 cups of fruits and vegetables each day  Eat fruits and vegetables with the peel because the peel is high in fiber  © Copyright [address] Information is for End User's use only and may not be sold, redistributed or otherwise used for commercial purposes  All illustrations and images included in CareNotes® are the copyrighted property of A D A M , Inc  or [address] [place] [address]  The above information is an  only  It is not intended as medical advice for individual conditions or treatments  Talk to your doctor, nurse or pharmacist before following any medical regimen to see if it is safe and effective for you  Heart Healthy Diet   AMBULATORY CARE:   A heart healthy diet  is an eating plan low in unhealthy fats and sodium (salt)  The plan is high in healthy fats and fiber  A heart healthy diet helps improve your cholesterol levels and lowers your risk for heart disease and stroke  A dietitian will teach you how to read and understand food labels  Heart healthy diet guidelines to follow:   · Choose foods that contain healthy fats  ? Unsaturated fats  include monounsaturated and polyunsaturated fats  Unsaturated fat is found in foods such as soybean, canola, olive, corn, and safflower oils  It is also found in soft tub margarine that is made with liquid vegetable oil  ? Omega-3 fat  is found in certain fish, such as salmon, tuna, and trout, and in walnuts and flaxseed  Eat fish high in omega-3 fats at least 2 times a week  · Get 20 to 30 grams of fiber each day  Fruits, vegetables, whole-grain foods, and legumes (cooked beans) are good sources of fiber  · Limit or do not have unhealthy fats  ?  Cholesterol is found in animal foods, such as eggs and lobster, and in dairy products made from whole milk  Limit cholesterol to less than 200 mg each day  ? Saturated fat  is found in meats, such as schmitz and hamburger  It is also found in chicken or turkey skin, whole milk, and butter  Limit saturated fat to less than 7% of your total daily calories  ? Trans fat  is found in packaged foods, such as potato chips and cookies  It is also in hard margarine, some fried foods, and shortening  Do not eat foods that contain trans fats  · Limit sodium as directed  You may be told to limit sodium to 2,000 to 2,300 mg each day  Choose low-sodium or no-salt-added foods  Add little or no salt to food you prepare  Use herbs and spices in place of salt  Include the following in your heart healthy plan:  Ask your dietitian or healthcare provider how many servings to have from each of the following food groups:  · Grains:      ? Whole-wheat breads, cereals, and pastas, and brown rice    ? Low-fat, low-sodium crackers and chips    · Vegetables:      ? Broccoli, green beans, green peas, and spinach    ? Collards, kale, and lima beans    ? Carrots, sweet potatoes, tomatoes, and peppers    ? Canned vegetables with no salt added    · Fruits:      ? Bananas, peaches, pears, and pineapple    ? Grapes, raisins, and dates    ? Oranges, tangerines, grapefruit, orange juice, and grapefruit juice    ? Apricots, mangoes, melons, and papaya    ? Raspberries and strawberries    ? Canned fruit with no added sugar    · Low-fat dairy:      ? Nonfat (skim) milk, 1% milk, and low-fat almond, cashew, or soy milks fortified with calcium    ? Low-fat cheese, regular or frozen yogurt, and cottage cheese    · Meats and proteins:      ? Lean cuts of beef and pork (loin, leg, round), skinless chicken and turkey    ?  Legumes, soy products, egg whites, or nuts    Limit or do not include the following in your heart healthy plan:   · Unhealthy fats and oils:      ? Whole or 2% milk, cream cheese, sour cream, or cheese    ? High-fat cuts of beef (T-bone steaks, ribs), chicken or turkey with skin, and organ meats such as liver    ? Butter, stick margarine, shortening, and cooking oils such as coconut or palm oil    · Foods and liquids high in sodium:      ? Packaged foods, such as frozen dinners, cookies, macaroni and cheese, and cereals with more than 300 mg of sodium per serving    ? Vegetables with added sodium, such as instant potatoes, vegetables with added sauces, or regular canned vegetables    ? Cured or smoked meats, such as hot dogs, schmitz, and sausage    ? High-sodium ketchup, barbecue sauce, salad dressing, pickles, olives, soy sauce, or miso    · Foods and liquids high in sugar:      ? Candy, cake, cookies, pies, or doughnuts    ? Soft drinks (soda), sports drinks, or sweetened tea    ? Canned or dry mixes for cakes, soups, sauces, or gravies    Other healthy heart guidelines:   · Do not smoke  Nicotine and other chemicals in cigarettes and cigars can cause lung and heart damage  Ask your healthcare provider for information if you currently smoke and need help to quit  E-cigarettes or smokeless tobacco still contain nicotine  Talk to your healthcare provider before you use these products  · Limit or do not drink alcohol as directed  Alcohol can damage your heart and raise your blood pressure  Your healthcare provider may give you specific daily and weekly limits  The general recommended limit is 1 drink a day for women 21 or older and for men 72 or older  Do not have more than 3 drinks in a day or 7 in a week  The recommended limit is 2 drinks a day for men 24to 59years of age  Do not have more than 4 drinks in a day or 14 in a week  A drink of alcohol is 12 ounces of beer, 5 ounces of wine, or 1½ ounces of liquor  · Exercise regularly  Exercise can help you maintain a healthy weight and improve your blood pressure and cholesterol levels  Regular exercise can also decrease your risk for heart problems  Ask your healthcare provider about the best exercise plan for you  Do not start an exercise program without asking your healthcare provider  Follow up with your doctor or cardiologist as directed:  Write down your questions so you remember to ask them during your visits  © Copyright [address] Information is for End User's use only and may not be sold, redistributed or otherwise used for commercial purposes  All illustrations and images included in CareNotes® are the copyrighted property of A D A M , Inc  or [address] [place] [address]  The above information is an  only  It is not intended as medical advice for individual conditions or treatments  Talk to your doctor, nurse or pharmacist before following any medical regimen to see if it is safe and effective for you  LESLIE Linares    Portions of the record may have been created with voice recognition software  Occasional wrong word or "sound a like" substitutions may have occurred due to the inherent limitations of voice recognition software  Read the chart carefully and recognize, using context, where substitutions have occurred  BMI Counseling: Body mass index is 26 43 kg/m²  The BMI is above normal  Nutrition recommendations include reducing portion sizes, decreasing overall calorie intake, 3-5 servings of fruits/vegetables daily, reducing fast food intake, consuming healthier snacks, decreasing soda and/or juice intake, moderation in carbohydrate intake, increasing intake of lean protein, reducing intake of saturated fat and trans fat and reducing intake of cholesterol  Exercise recommendations include moderate aerobic physical activity for 150 minutes/week

## 2021-01-11 NOTE — ASSESSMENT & PLAN NOTE
Pt is not ready to work on smoking cessation, but will let me know  Plans on stopping this year, maybe by June per pt  Also refuses flu shot and pneumonia shot

## 2021-01-11 NOTE — PATIENT INSTRUCTIONS
Reviewed all with patient  Keep the follow-up with your neurologist   Have the labs done fasting but drink water before you go for the test   If the new labs are normal we will discuss all at follow-up appointment in about 2 weeks when I will do your Medicare wellness and also remove that lesion from your right calf  I would like you to consider stopping smoking and may be at this point you could cut down by 1 cigarette but your wife is the problem  It is very difficult for 1 person to stop smoking if the partners smoking as well  Will discuss that with her as well  Patient is refusing the flu shot the pneumonia shot and prefers not to get the COVID vaccine  Low Fat Diet   AMBULATORY CARE:   A low-fat diet  is an eating plan that is low in total fat, unhealthy fat, and cholesterol  You may need to follow a low-fat diet if you have trouble digesting or absorbing fat  You may also need to follow this diet if you have high cholesterol  You can also lower your cholesterol by increasing the amount of fiber in your diet  Soluble fiber is a type of fiber that helps to decrease cholesterol levels  Different types of fat in food:   · Limit unhealthy fats  A diet that is high in cholesterol, saturated fat, and trans fat may cause unhealthy cholesterol levels  Unhealthy cholesterol levels increase your risk of heart disease  ? Cholesterol:  Limit intake of cholesterol to less than 200 mg per day  Cholesterol is found in meat, eggs, and dairy  ? Saturated fat:  Limit saturated fat to less than 7% of your total daily calories  Ask your dietitian how many calories you need each day  Saturated fat is found in butter, cheese, ice cream, whole milk, and palm oil  Saturated fat is also found in meat, such as beef, pork, chicken skin, and processed meats  Processed meats include sausage, hot dogs, and bologna  ? Trans fat:  Avoid trans fat as much as possible  Trans fat is used in fried and baked foods   Foods that say trans fat free on the label may still have up to 0 5 grams of trans fat per serving  · Include healthy fats  Replace foods that are high in saturated and trans fat with foods high in healthy fats  This may help to decrease high cholesterol levels  ? Monounsaturated fats: These are found in avocados, nuts, and vegetable oils, such as olive, canola, and sunflower oil  ? Polyunsaturated fats: These can be found in vegetable oils, such as soybean or corn oil  Omega-3 fats can help to decrease the risk of heart disease  Omega-3 fats are found in fish, such as salmon, herring, trout, and tuna  Omega-3 fats can also be found in plant foods, such as walnuts, flaxseed, soybeans, and canola oil  Foods to limit or avoid:   · Grains:      ? Snacks that are made with partially hydrogenated oils, such as chips, regular crackers, and butter-flavored popcorn    ? High-fat baked goods, such as biscuits, croissants, doughnuts, pies, cookies, and pastries    · Dairy:      ? Whole milk, 2% milk, and yogurt and ice cream made with whole milk    ? Half and half creamer, heavy cream, and whipping cream    ? Cheese, cream cheese, and sour cream    · Meats and proteins:      ? High-fat cuts of meat (T-bone steak, regular hamburger, and ribs)    ? Fried meat, poultry (turkey and chicken), and fish    ? Poultry (chicken and turkey) with skin    ? Cold cuts (salami or bologna), hot dogs, schmitz, and sausage    ? Whole eggs and egg yolks    · Vegetables and fruits with added fat:      ? Fried vegetables or vegetables in butter or high-fat sauces, such as cream or cheese sauces    ? Fried fruit or fruit served with butter or cream    · Fats:      ? Butter, stick margarine, and shortening    ? Coconut, palm oil, and palm kernel oil    Foods to include:   · Grains:      ? Whole-grain breads, cereals, pasta, and brown rice    ?  Low-fat crackers and pretzels    · Vegetables and fruits:      ? Fresh, frozen, or canned vegetables (no salt or low-sodium)    ? Fresh, frozen, dried, or canned fruit (canned in light syrup or fruit juice)    ? Avocado    · Low-fat dairy products:      ? Nonfat (skim) or 1% milk    ? Nonfat or low-fat cheese, yogurt, and cottage cheese    · Meats and proteins:      ? Chicken or turkey with no skin    ? Baked or broiled fish    ? Lean beef and pork (loin, round, extra lean hamburger)    ? Beans and peas, unsalted nuts, soy products    ? Egg whites and substitutes    ? Seeds and nuts    · Fats:      ? Unsaturated oil, such as canola, olive, peanut, soybean, or sunflower oil    ? Soft or liquid margarine and vegetable oil spread    ? Low-fat salad dressing    Other ways to decrease fat:   · Read food labels before you buy foods  Choose foods that have less than 30% of calories from fat  Choose low-fat or fat-free dairy products  Remember that fat free does not mean calorie free  These foods still contain calories, and too many calories can lead to weight gain  · Trim fat from meat and avoid fried food  Trim all visible fat from meat before you cook it  Remove the skin from poultry  Do not shearer meat, fish, or poultry  Bake, roast, boil, or broil these foods instead  Avoid fried foods  Eat a baked potato instead of [place] fries  Steam vegetables instead of sautéing them in butter  · Add less fat to foods  Use imitation schmitz bits on salads and baked potatoes instead of regular schmitz bits  Use fat-free or low-fat salad dressings instead of regular dressings  Use low-fat or nonfat butter-flavored topping instead of regular butter or margarine on popcorn and other foods  Ways to decrease fat in recipes:  Replace high-fat ingredients with low-fat or nonfat ones  This may cause baked goods to be drier than usual  You may need to use nonfat cooking spray on pans to prevent food from sticking  You also may need to change the amount of other ingredients, such as water, in the recipe   Try the following:  · Use low-fat or light margarine instead of regular margarine or shortening  · Use lean ground turkey breast or chicken, or lean ground beef (less than 5% fat) instead of hamburger  · Add 1 teaspoon of canola oil to 8 ounces of skim milk instead of using cream or half and half  · Use grated zucchini, carrots, or apples in breads instead of coconut  · Use blenderized, low-fat cottage cheese, plain tofu, or low-fat ricotta cheese instead of cream cheese  · Use 1 egg white and 1 teaspoon of canola oil, or use ¼ cup (2 ounces) of fat-free egg substitute instead of a whole egg  · Replace half of the oil that is called for in a recipe with applesauce when you bake  Use 3 tablespoons of cocoa powder and 1 tablespoon of canola oil instead of a square of baking chocolate  How to increase fiber:  Eat enough high-fiber foods to get 20 to 30 grams of fiber every day  Slowly increase your fiber intake to avoid stomach cramps, gas, and other problems  · Eat 3 ounces of whole-grain foods each day  An ounce is about 1 slice of bread  Eat whole-grain breads, such as whole-wheat bread  Whole wheat, whole-wheat flour, or other whole grains should be listed as the first ingredient on the food label  Replace white flour with whole-grain flour or use half of each in recipes  Whole-grain flour is heavier than white flour, so you may have to add more yeast or baking powder  · Eat a high-fiber cereal for breakfast   Oatmeal is a good source of soluble fiber  Look for cereals that have bran or fiber in the name  Choose whole-grain products, such as brown rice, barley, and whole-wheat pasta  · Eat more beans, peas, and lentils  For example, add beans to soups or salads  Eat at least 5 cups of fruits and vegetables each day  Eat fruits and vegetables with the peel because the peel is high in fiber      © Copyright [address] Information is for End User's use only and may not be sold, redistributed or otherwise used for commercial purposes  All illustrations and images included in CareNotes® are the copyrighted property of [employer]  or [employer]  The above information is an  only  It is not intended as medical advice for individual conditions or treatments  Talk to your doctor, nurse or pharmacist before following any medical regimen to see if it is safe and effective for you  Heart Healthy Diet   AMBULATORY CARE:   A heart healthy diet  is an eating plan low in unhealthy fats and sodium (salt)  The plan is high in healthy fats and fiber  A heart healthy diet helps improve your cholesterol levels and lowers your risk for heart disease and stroke  A dietitian will teach you how to read and understand food labels  Heart healthy diet guidelines to follow:   · Choose foods that contain healthy fats  ? Unsaturated fats  include monounsaturated and polyunsaturated fats  Unsaturated fat is found in foods such as soybean, canola, olive, corn, and safflower oils  It is also found in soft tub margarine that is made with liquid vegetable oil  ? Omega-3 fat  is found in certain fish, such as salmon, tuna, and trout, and in walnuts and flaxseed  Eat fish high in omega-3 fats at least 2 times a week  · Get 20 to 30 grams of fiber each day  Fruits, vegetables, whole-grain foods, and legumes (cooked beans) are good sources of fiber  · Limit or do not have unhealthy fats  ? Cholesterol  is found in animal foods, such as eggs and lobster, and in dairy products made from whole milk  Limit cholesterol to less than 200 mg each day  ? Saturated fat  is found in meats, such as schmitz and hamburger  It is also found in chicken or turkey skin, whole milk, and butter  Limit saturated fat to less than 7% of your total daily calories  ? Trans fat  is found in packaged foods, such as potato chips and cookies   It is also in hard margarine, some fried foods, and shortening  Do not eat foods that contain trans fats  · Limit sodium as directed  You may be told to limit sodium to 2,000 to 2,300 mg each day  Choose low-sodium or no-salt-added foods  Add little or no salt to food you prepare  Use herbs and spices in place of salt  Include the following in your heart healthy plan:  Ask your dietitian or healthcare provider how many servings to have from each of the following food groups:  · Grains:      ? Whole-wheat breads, cereals, and pastas, and brown rice    ? Low-fat, low-sodium crackers and chips    · Vegetables:      ? Broccoli, green beans, green peas, and spinach    ? Collards, kale, and lima beans    ? Carrots, sweet potatoes, tomatoes, and peppers    ? Canned vegetables with no salt added    · Fruits:      ? Bananas, peaches, pears, and pineapple    ? Grapes, raisins, and dates    ? Oranges, tangerines, grapefruit, orange juice, and grapefruit juice    ? Apricots, mangoes, melons, and papaya    ? Raspberries and strawberries    ? Canned fruit with no added sugar    · Low-fat dairy:      ? Nonfat (skim) milk, 1% milk, and low-fat almond, cashew, or soy milks fortified with calcium    ? Low-fat cheese, regular or frozen yogurt, and cottage cheese    · Meats and proteins:      ? Lean cuts of beef and pork (loin, leg, round), skinless chicken and turkey    ? Legumes, soy products, egg whites, or nuts    Limit or do not include the following in your heart healthy plan:   · Unhealthy fats and oils:      ? Whole or 2% milk, cream cheese, sour cream, or cheese    ? High-fat cuts of beef (T-bone steaks, ribs), chicken or turkey with skin, and organ meats such as liver    ? Butter, stick margarine, shortening, and cooking oils such as coconut or palm oil    · Foods and liquids high in sodium:      ? Packaged foods, such as frozen dinners, cookies, macaroni and cheese, and cereals with more than 300 mg of sodium per serving    ?  Vegetables with added sodium, such as instant potatoes, vegetables with added sauces, or regular canned vegetables    ? Cured or smoked meats, such as hot dogs, schmitz, and sausage    ? High-sodium ketchup, barbecue sauce, salad dressing, pickles, olives, soy sauce, or miso    · Foods and liquids high in sugar:      ? Candy, cake, cookies, pies, or doughnuts    ? Soft drinks (soda), sports drinks, or sweetened tea    ? Canned or dry mixes for cakes, soups, sauces, or gravies    Other healthy heart guidelines:   · Do not smoke  Nicotine and other chemicals in cigarettes and cigars can cause lung and heart damage  Ask your healthcare provider for information if you currently smoke and need help to quit  E-cigarettes or smokeless tobacco still contain nicotine  Talk to your healthcare provider before you use these products  · Limit or do not drink alcohol as directed  Alcohol can damage your heart and raise your blood pressure  Your healthcare provider may give you specific daily and weekly limits  The general recommended limit is 1 drink a day for women 21 or older and for men 72 or older  Do not have more than 3 drinks in a day or 7 in a week  The recommended limit is 2 drinks a day for men 24to 59years of age  Do not have more than 4 drinks in a day or 14 in a week  A drink of alcohol is 12 ounces of beer, 5 ounces of wine, or 1½ ounces of liquor  · Exercise regularly  Exercise can help you maintain a healthy weight and improve your blood pressure and cholesterol levels  Regular exercise can also decrease your risk for heart problems  Ask your healthcare provider about the best exercise plan for you  Do not start an exercise program without asking your healthcare provider  Follow up with your doctor or cardiologist as directed:  Write down your questions so you remember to ask them during your visits    © Copyright [address] Information is for End User's use only and may not be sold, redistributed or otherwise used for commercial purposes  All illustrations and images included in CareNotes® are the copyrighted property of [employer]  or [employer]  The above information is an  only  It is not intended as medical advice for individual conditions or treatments  Talk to your doctor, nurse or pharmacist before following any medical regimen to see if it is safe and effective for you

## 2021-01-14 ENCOUNTER — LAB (OUTPATIENT)
Dept: LAB | Facility: CLINIC | Age: 41
End: 2021-01-14
Payer: COMMERCIAL

## 2021-01-14 ENCOUNTER — TRANSCRIBE ORDERS (OUTPATIENT)
Dept: LAB | Facility: CLINIC | Age: 41
End: 2021-01-14

## 2021-01-14 DIAGNOSIS — Z11.4 SCREENING FOR HIV (HUMAN IMMUNODEFICIENCY VIRUS): ICD-10-CM

## 2021-01-14 DIAGNOSIS — Z13.220 SCREENING, LIPID: ICD-10-CM

## 2021-01-14 DIAGNOSIS — R53.83 OTHER FATIGUE: ICD-10-CM

## 2021-01-14 LAB
ALBUMIN SERPL BCP-MCNC: 4.2 G/DL (ref 3.5–5)
ALP SERPL-CCNC: 96 U/L (ref 46–116)
ALT SERPL W P-5'-P-CCNC: 21 U/L (ref 12–78)
ANION GAP SERPL CALCULATED.3IONS-SCNC: 7 MMOL/L (ref 4–13)
AST SERPL W P-5'-P-CCNC: 23 U/L (ref 5–45)
BACTERIA UR QL AUTO: ABNORMAL /HPF
BASOPHILS # BLD AUTO: 0.06 THOUSANDS/ΜL (ref 0–0.1)
BASOPHILS NFR BLD AUTO: 1 % (ref 0–1)
BILIRUB SERPL-MCNC: 0.45 MG/DL (ref 0.2–1)
BILIRUB UR QL STRIP: NEGATIVE
BUN SERPL-MCNC: 15 MG/DL (ref 5–25)
CALCIUM SERPL-MCNC: 9.5 MG/DL (ref 8.3–10.1)
CHLORIDE SERPL-SCNC: 108 MMOL/L (ref 100–108)
CHOLEST SERPL-MCNC: 271 MG/DL (ref 50–200)
CLARITY UR: CLEAR
CO2 SERPL-SCNC: 25 MMOL/L (ref 21–32)
COLOR UR: ABNORMAL
CREAT SERPL-MCNC: 0.91 MG/DL (ref 0.6–1.3)
CREAT UR-MCNC: 260 MG/DL
EOSINOPHIL # BLD AUTO: 0.37 THOUSAND/ΜL (ref 0–0.61)
EOSINOPHIL NFR BLD AUTO: 7 % (ref 0–6)
ERYTHROCYTE [DISTWIDTH] IN BLOOD BY AUTOMATED COUNT: 12.5 % (ref 11.6–15.1)
GFR SERPL CREATININE-BSD FRML MDRD: 106 ML/MIN/1.73SQ M
GLUCOSE P FAST SERPL-MCNC: 103 MG/DL (ref 65–99)
GLUCOSE UR STRIP-MCNC: NEGATIVE MG/DL
HCT VFR BLD AUTO: 49.8 % (ref 36.5–49.3)
HDLC SERPL-MCNC: 36 MG/DL
HGB BLD-MCNC: 16.4 G/DL (ref 12–17)
HGB UR QL STRIP.AUTO: NEGATIVE
HYALINE CASTS #/AREA URNS LPF: ABNORMAL /LPF
IMM GRANULOCYTES # BLD AUTO: 0.01 THOUSAND/UL (ref 0–0.2)
IMM GRANULOCYTES NFR BLD AUTO: 0 % (ref 0–2)
KETONES UR STRIP-MCNC: ABNORMAL MG/DL
LEUKOCYTE ESTERASE UR QL STRIP: NEGATIVE
LYMPHOCYTES # BLD AUTO: 1.42 THOUSANDS/ΜL (ref 0.6–4.47)
LYMPHOCYTES NFR BLD AUTO: 26 % (ref 14–44)
MCH RBC QN AUTO: 30.8 PG (ref 26.8–34.3)
MCHC RBC AUTO-ENTMCNC: 32.9 G/DL (ref 31.4–37.4)
MCV RBC AUTO: 93 FL (ref 82–98)
MICROALBUMIN UR-MCNC: 12.5 MG/L (ref 0–20)
MICROALBUMIN/CREAT 24H UR: 5 MG/G CREATININE (ref 0–30)
MONOCYTES # BLD AUTO: 0.54 THOUSAND/ΜL (ref 0.17–1.22)
MONOCYTES NFR BLD AUTO: 10 % (ref 4–12)
NEUTROPHILS # BLD AUTO: 2.97 THOUSANDS/ΜL (ref 1.85–7.62)
NEUTS SEG NFR BLD AUTO: 56 % (ref 43–75)
NITRITE UR QL STRIP: NEGATIVE
NON-SQ EPI CELLS URNS QL MICRO: ABNORMAL /HPF
NONHDLC SERPL-MCNC: 235 MG/DL
NRBC BLD AUTO-RTO: 0 /100 WBCS
PH UR STRIP.AUTO: 6 [PH]
PLATELET # BLD AUTO: 225 THOUSANDS/UL (ref 149–390)
PMV BLD AUTO: 10.4 FL (ref 8.9–12.7)
POTASSIUM SERPL-SCNC: 4.3 MMOL/L (ref 3.5–5.3)
PROT SERPL-MCNC: 7.4 G/DL (ref 6.4–8.2)
PROT UR STRIP-MCNC: NEGATIVE MG/DL
RBC # BLD AUTO: 5.33 MILLION/UL (ref 3.88–5.62)
RBC #/AREA URNS AUTO: ABNORMAL /HPF
SODIUM SERPL-SCNC: 140 MMOL/L (ref 136–145)
SP GR UR STRIP.AUTO: 1.03 (ref 1–1.03)
TRIGL SERPL-MCNC: 622 MG/DL
TSH SERPL DL<=0.05 MIU/L-ACNC: 1.34 UIU/ML (ref 0.36–3.74)
UROBILINOGEN UR QL STRIP.AUTO: 0.2 E.U./DL
WBC # BLD AUTO: 5.37 THOUSAND/UL (ref 4.31–10.16)
WBC #/AREA URNS AUTO: ABNORMAL /HPF

## 2021-01-14 PROCEDURE — 84443 ASSAY THYROID STIM HORMONE: CPT

## 2021-01-14 PROCEDURE — 85025 COMPLETE CBC W/AUTO DIFF WBC: CPT

## 2021-01-14 PROCEDURE — 36415 COLL VENOUS BLD VENIPUNCTURE: CPT

## 2021-01-14 PROCEDURE — 80053 COMPREHEN METABOLIC PANEL: CPT

## 2021-01-14 PROCEDURE — 82570 ASSAY OF URINE CREATININE: CPT | Performed by: FAMILY MEDICINE

## 2021-01-14 PROCEDURE — 80061 LIPID PANEL: CPT

## 2021-01-14 PROCEDURE — 82043 UR ALBUMIN QUANTITATIVE: CPT | Performed by: FAMILY MEDICINE

## 2021-01-14 PROCEDURE — 87389 HIV-1 AG W/HIV-1&-2 AB AG IA: CPT

## 2021-01-14 PROCEDURE — 81001 URINALYSIS AUTO W/SCOPE: CPT | Performed by: FAMILY MEDICINE

## 2021-01-15 DIAGNOSIS — E78.2 MIXED HYPERLIPIDEMIA: ICD-10-CM

## 2021-01-15 DIAGNOSIS — E78.2 MIXED HYPERLIPIDEMIA: Primary | ICD-10-CM

## 2021-01-15 LAB — HIV 1+2 AB+HIV1 P24 AG SERPL QL IA: NORMAL

## 2021-01-15 RX ORDER — ROSUVASTATIN CALCIUM 10 MG/1
10 TABLET, COATED ORAL DAILY
Qty: 30 TABLET | Refills: 1 | Status: SHIPPED | OUTPATIENT
Start: 2021-01-15 | End: 2021-03-15

## 2021-01-15 RX ORDER — ICOSAPENT ETHYL 1000 MG/1
2 CAPSULE ORAL 2 TIMES DAILY
Qty: 120 CAPSULE | Refills: 3 | Status: SHIPPED | OUTPATIENT
Start: 2021-01-15 | End: 2021-03-22 | Stop reason: ALTCHOICE

## 2021-01-15 RX ORDER — ICOSAPENT ETHYL 1000 MG/1
2 CAPSULE ORAL 2 TIMES DAILY
Qty: 120 CAPSULE | Refills: 3 | Status: SHIPPED | OUTPATIENT
Start: 2021-01-15 | End: 2021-01-15 | Stop reason: SDUPTHER

## 2021-01-15 RX ORDER — ROSUVASTATIN CALCIUM 10 MG/1
10 TABLET, COATED ORAL DAILY
Qty: 30 TABLET | Refills: 1 | Status: SHIPPED | OUTPATIENT
Start: 2021-01-15 | End: 2021-01-15 | Stop reason: SDUPTHER

## 2021-01-22 ENCOUNTER — TELEPHONE (OUTPATIENT)
Dept: FAMILY MEDICINE CLINIC | Facility: CLINIC | Age: 41
End: 2021-01-22

## 2021-01-22 DIAGNOSIS — E78.2 MIXED HYPERLIPIDEMIA: Primary | ICD-10-CM

## 2021-01-22 RX ORDER — OMEGA-3-ACID ETHYL ESTERS 1 G/1
2 CAPSULE, LIQUID FILLED ORAL 2 TIMES DAILY
Qty: 120 CAPSULE | Refills: 3 | Status: SHIPPED | OUTPATIENT
Start: 2021-01-22 | End: 2021-06-15

## 2021-03-12 ENCOUNTER — APPOINTMENT (OUTPATIENT)
Dept: LAB | Facility: CLINIC | Age: 41
End: 2021-03-12
Payer: COMMERCIAL

## 2021-03-12 ENCOUNTER — TRANSCRIBE ORDERS (OUTPATIENT)
Dept: LAB | Facility: CLINIC | Age: 41
End: 2021-03-12

## 2021-03-12 DIAGNOSIS — E78.2 MIXED HYPERLIPIDEMIA: ICD-10-CM

## 2021-03-12 LAB
ALBUMIN SERPL BCP-MCNC: 4.8 G/DL (ref 3.5–5)
ALP SERPL-CCNC: 85 U/L (ref 46–116)
ALT SERPL W P-5'-P-CCNC: 24 U/L (ref 12–78)
ANION GAP SERPL CALCULATED.3IONS-SCNC: 6 MMOL/L (ref 4–13)
AST SERPL W P-5'-P-CCNC: 18 U/L (ref 5–45)
BILIRUB SERPL-MCNC: 0.7 MG/DL (ref 0.2–1)
BUN SERPL-MCNC: 16 MG/DL (ref 5–25)
CALCIUM SERPL-MCNC: 9.8 MG/DL (ref 8.3–10.1)
CHLORIDE SERPL-SCNC: 105 MMOL/L (ref 100–108)
CHOLEST SERPL-MCNC: 139 MG/DL (ref 50–200)
CO2 SERPL-SCNC: 28 MMOL/L (ref 21–32)
CREAT SERPL-MCNC: 1.15 MG/DL (ref 0.6–1.3)
GFR SERPL CREATININE-BSD FRML MDRD: 80 ML/MIN/1.73SQ M
GLUCOSE P FAST SERPL-MCNC: 86 MG/DL (ref 65–99)
HDLC SERPL-MCNC: 33 MG/DL
LDLC SERPL CALC-MCNC: 77 MG/DL (ref 0–100)
NONHDLC SERPL-MCNC: 106 MG/DL
POTASSIUM SERPL-SCNC: 4.3 MMOL/L (ref 3.5–5.3)
PROT SERPL-MCNC: 7.7 G/DL (ref 6.4–8.2)
SODIUM SERPL-SCNC: 139 MMOL/L (ref 136–145)
TRIGL SERPL-MCNC: 147 MG/DL

## 2021-03-12 PROCEDURE — 36415 COLL VENOUS BLD VENIPUNCTURE: CPT

## 2021-03-12 PROCEDURE — 80053 COMPREHEN METABOLIC PANEL: CPT

## 2021-03-12 PROCEDURE — 80061 LIPID PANEL: CPT

## 2021-03-15 ENCOUNTER — RA CDI HCC (OUTPATIENT)
Dept: OTHER | Facility: HOSPITAL | Age: 41
End: 2021-03-15

## 2021-03-15 DIAGNOSIS — E78.2 MIXED HYPERLIPIDEMIA: ICD-10-CM

## 2021-03-15 RX ORDER — ROSUVASTATIN CALCIUM 10 MG/1
TABLET, COATED ORAL
Qty: 30 TABLET | Refills: 1 | Status: SHIPPED | OUTPATIENT
Start: 2021-03-15 | End: 2021-05-20
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
## 2021-03-22 ENCOUNTER — OFFICE VISIT (OUTPATIENT)
Dept: FAMILY MEDICINE CLINIC | Facility: CLINIC | Age: 41
End: 2021-03-22
Payer: COMMERCIAL

## 2021-03-22 VITALS
WEIGHT: 167 LBS | HEIGHT: 70 IN | RESPIRATION RATE: 16 BRPM | BODY MASS INDEX: 23.91 KG/M2 | TEMPERATURE: 97.8 F | DIASTOLIC BLOOD PRESSURE: 90 MMHG | HEART RATE: 97 BPM | OXYGEN SATURATION: 97 % | SYSTOLIC BLOOD PRESSURE: 140 MMHG

## 2021-03-22 DIAGNOSIS — L29.9 PRURITIC DERMATITIS: ICD-10-CM

## 2021-03-22 DIAGNOSIS — F17.200 TOBACCO DEPENDENCE: ICD-10-CM

## 2021-03-22 DIAGNOSIS — Z12.11 COLON CANCER SCREENING: ICD-10-CM

## 2021-03-22 DIAGNOSIS — Z00.00 MEDICARE ANNUAL WELLNESS VISIT, SUBSEQUENT: ICD-10-CM

## 2021-03-22 DIAGNOSIS — R63.4 WEIGHT LOSS: ICD-10-CM

## 2021-03-22 DIAGNOSIS — F17.200 CURRENT EVERY DAY SMOKER: ICD-10-CM

## 2021-03-22 DIAGNOSIS — E78.1 HYPERTRIGLYCERIDEMIA: ICD-10-CM

## 2021-03-22 DIAGNOSIS — G20 PARKINSON'S DISEASE (HCC): Primary | ICD-10-CM

## 2021-03-22 PROCEDURE — 3008F BODY MASS INDEX DOCD: CPT | Performed by: FAMILY MEDICINE

## 2021-03-22 PROCEDURE — G0439 PPPS, SUBSEQ VISIT: HCPCS | Performed by: FAMILY MEDICINE

## 2021-03-22 PROCEDURE — 99214 OFFICE O/P EST MOD 30 MIN: CPT | Performed by: FAMILY MEDICINE

## 2021-03-22 PROCEDURE — 3725F SCREEN DEPRESSION PERFORMED: CPT | Performed by: FAMILY MEDICINE

## 2021-03-22 RX ORDER — MOMETASONE FUROATE 1 MG/G
CREAM TOPICAL DAILY
Qty: 45 G | Refills: 0 | Status: SHIPPED | OUTPATIENT
Start: 2021-03-22 | End: 2022-07-07 | Stop reason: ALTCHOICE

## 2021-03-22 RX ORDER — ENTACAPONE 200 MG/1
TABLET ORAL
COMMUNITY
Start: 2021-02-24

## 2021-03-22 NOTE — PROGRESS NOTES
Assessment/Plan:     Chronic Problems:  Parkinson's disease ([facility] [address] ) - early onset  Keep the f/u with neuro and discuss the bone aches, however pt was advised the rosuvastatin can cause cramping  Current every day smoker  Advised pt he needs to stop smoking  Hypertriglyceridemia  Much better on lovaza and rosuvastatin  Continue the same  Advised pt he can have pasta on occasion but not all the time and smaller portions  Visit Diagnosis:  Diagnoses and all orders for this visit:    [facility] ([facility] ) - early onset    Medicare annual wellness visit, subsequent  Comments:  Pt is utd on his labs  Follows with neuro for Parksinsons and memory loss  Refuses all vaccines  Tobacco dependence  Comments:  Advised smoking cessation  Pt is not ready to stop smoking  Info given  Current every day smoker    Weight loss  Comments:  Pt has changed his diet and lost weight since the last visit  Suspect the weight loss is from dietary modifications  Trigs down from over 600 to 140's  Orders:  -     TSH, 3rd generation with Free T4 reflex; Future  -     CBC and differential; Future    Pruritic dermatitis  Comments:  No rash, but scratch marks  Use the mometasone cream once dialy to both lower legs after the shower  Orders:  -     mometasone (ELOCON) 0 1 % cream; Apply topically daily    Hypertriglyceridemia    Colon cancer screening  -     Occult Blood, Fecal Immunochemical; Future    Other orders  -     entacapone (COMTAN) 200 mg tablet; take 1 tablet by mouth five times a day  -     carbidopa-levodopa (SINEMET)  mg per tablet; take 2 tablets by mouth five times a day          Subjective:    Patient ID: Samir Leger  is a 45 y o  male  Pt is here for routine f/u appt  Has a h/o Parkinsons and follows with neuro, Dr Kp Acosta in [place]  Also has cognitive decline from the Parkinsons  Had labs done and here to discuss results  Has complaints  Legs are itchy and for the last two weeks   No rash, just itchy  Has a dog, but does not have fleas  No new outdoor activity  Did not try any meds for it  Worse washing the soap off his legs  Did not change his soap or laundry detergent  No one else in the house has this  Did not try allergy pill  Gets dull bone pains in the left lower extremities at times  Started the same time as the rash  Not sure if this is Parkinsons as it resembles some of the Parkinsons sx  Has f/u with neuro in May  Pt is also concerned that he has weight loss since the last visit  Was 184 now 167  Pt states his previous Parkinsons med is out of stock and he is back on his old med for about 6 weeks  Takes all other meds as directed  No side effects noted  Pt did change his diet and eats more salads and not eating pasta and meaballs  Never checks his bp's at home  The following portions of the patient's history were reviewed and updated as appropriate: allergies, current medications, past family history, past medical history, past social history, past surgical history and problem list     Review of Systems   Constitutional: Positive for fatigue (from the Parkinsons  )  Negative for chills, diaphoresis and fever  HENT: Negative  Eyes: Positive for visual disturbance (at times  Follows with an eye doctor  Wife is an   )  Respiratory: Negative for cough, shortness of breath and wheezing  Cardiovascular: Negative for chest pain and palpitations  Gastrointestinal: Negative for abdominal pain, constipation, diarrhea, nausea and vomiting  Genitourinary: Negative  Musculoskeletal: Positive for arthralgias (but bone ache in the left shin area  Has pains in both shoulders  ) and neck pain (also from Parkinsons  )  Negative for back pain  Neurological: Positive for light-headedness (when standing up and told r/t Parkinsons)  Negative for dizziness and headaches  Psychiatric/Behavioral: Negative for dysphoric mood  The patient is not nervous/anxious            BP 140/90   Pulse 97   Temp 97 8 °F (36 6 °C)   Resp 16   Ht 5' 10" (1 778 m)   Wt 75 8 kg (167 lb)   SpO2 97%   BMI 23 96 kg/m²   Social History     Socioeconomic History    Marital status: /Civil Union     Spouse name: Not on file    Number of children: Not on file    Years of education: Not on file    Highest education level: Not on file   Occupational History    Not on file   Social Needs    Financial resource strain: Not on file    Food insecurity     Worry: Not on file     Inability: Not on file   [employer] needs     Medical: Not on file     Non-medical: Not on file   Tobacco Use    Smoking status: Current Some Day Smoker     Packs/day: 0 50     Types: Cigarettes    Smokeless tobacco: Never Used   Substance and Sexual Activity    Alcohol use: Yes     Frequency: Monthly or less     Binge frequency: Never     Comment: friday nights    Drug use: No    Sexual activity: Not on file   Lifestyle    Physical activity     Days per week: Not on file     Minutes per session: Not on file    Stress: Not on file   Relationships    Social connections     Talks on phone: Not on file     Gets together: Not on file     Attends Caodaism service: Not on file     Active member of club or organization: Not on file     Attends meetings of clubs or organizations: Not on file     Relationship status: Not on file    Intimate partner violence     Fear of current or ex partner: Not on file     Emotionally abused: Not on file     Physically abused: Not on file     Forced sexual activity: Not on file   Other Topics Concern    Not on file   Social History Narrative    Not on file     Past Medical History:   Diagnosis Date    Parkinson's disease [facility])      History reviewed  No pertinent family history  History reviewed  No pertinent surgical history      Current Outpatient Medications:     carbidopa-levodopa (SINEMET)  mg per tablet, take 2 tablets by mouth five times a day, Disp: , Rfl:    entacapone (COMTAN) 200 mg tablet, take 1 tablet by mouth five times a day, Disp: , Rfl:     hydrOXYzine HCL (ATARAX) 50 mg tablet, Take 50 mg by mouth every evening, Disp: , Rfl:     omega-3-acid ethyl esters (LOVAZA) 1 g capsule, Take 2 capsules (2 g total) by mouth 2 (two) times a day, Disp: 120 capsule, Rfl: 3    rosuvastatin (CRESTOR) 10 MG tablet, take 1 tablet by mouth once daily, Disp: 30 tablet, Rfl: 1    mometasone (ELOCON) 0 1 % cream, Apply topically daily, Disp: 45 g, Rfl: 0    No Known Allergies       Lab Review   Appointment on 03/12/2021   Component Date Value    Sodium 03/12/2021 139     Potassium 03/12/2021 4 3     Chloride 03/12/2021 105     CO2 03/12/2021 28     ANION GAP 03/12/2021 6     BUN 03/12/2021 16     Creatinine 03/12/2021 1 15     Glucose, Fasting 03/12/2021 86     Calcium 03/12/2021 9 8     AST 03/12/2021 18     ALT 03/12/2021 24     Alkaline Phosphatase 03/12/2021 85     Total Protein 03/12/2021 7 7     Albumin 03/12/2021 4 8     Total Bilirubin 03/12/2021 0 70     eGFR 03/12/2021 80     Cholesterol 03/12/2021 139     Triglycerides 03/12/2021 147     HDL, Direct 03/12/2021 33*    LDL Calculated 03/12/2021 77     Non-HDL-Chol (CHOL-HDL) 03/12/2021 106         Imaging: No results found  Objective:     Physical Exam  Vitals signs and nursing note reviewed  Constitutional:       General: He is not in acute distress  Appearance: He is well-developed  He is not diaphoretic  HENT:      Head: Normocephalic and atraumatic  Right Ear: External ear normal       Left Ear: External ear normal       Nose: Nose normal    Eyes:      General:         Right eye: No discharge  Left eye: No discharge  Conjunctiva/sclera: Conjunctivae normal       Pupils: Pupils are equal, round, and reactive to light  Neck:      Musculoskeletal: Normal range of motion  Thyroid: No thyromegaly     Cardiovascular:      Rate and Rhythm: Normal rate and regular rhythm  Heart sounds: Normal heart sounds  No murmur  Pulmonary:      Effort: Pulmonary effort is normal  No respiratory distress  Breath sounds: No wheezing or rales  Abdominal:      General: Bowel sounds are normal       Palpations: Abdomen is soft  Tenderness: There is no abdominal tenderness  Musculoskeletal: Normal range of motion  General: No tenderness or deformity  Lymphadenopathy:      Cervical: No cervical adenopathy  Skin:     General: Skin is warm and dry  Neurological:      Mental Status: He is alert and oriented to person, place, and time  Cranial Nerves: No cranial nerve deficit  Comments: Steady gait with no tremor today  Psychiatric:         Mood and Affect: Mood normal          Behavior: Behavior normal          Thought Content: Thought content normal          Judgment: Judgment normal            Patient Instructions     Reviewed all with patient  Please make an appointment with your eye doctor for your blurred vision at times  We really do not want a put the diagnosis of Parkinson's on everything so let the specialist see you  Rosuvastatin can cause muscle aches so I wonder if that is not what your having in your left lower leg  I do not see a rash other than scratch marks on your legs so after your shower dry your body put a thin layer of mometasone cream on both areas that are itchy and leave the legs alone  If the pruritus does not resolve please call and let me know  I believe the weight loss is from your dietary modifications  You have done excellent in bringing your triglycerides down almost 500 points  It is not that you could never have bread or pasta again but you really seriously do have to limit this type of carbohydrates  Keep your follow-up appointment with Neurology  Follow-up here in about 4 months sooner for any problems  Work on stopping smoking as you do not need any extra problems    Please do the stool specimen and the additional labs but I suspect they will be normal   I will call with results  If you change your mind about the COVID vaccine let me know  Medicare Preventive Visit Patient Instructions  Thank you for completing your Welcome to Medicare Visit or Medicare Annual Wellness Visit today  Your next wellness visit will be due in one year (3/23/2022)  The screening/preventive services that you may require over the next 5-10 years are detailed below  Some tests may not apply to you based off risk factors and/or age  Screening tests ordered at today's visit but not completed yet may show as past due  Also, please note that scanned in results may not display below  Preventive Screenings:  Service Recommendations Previous Testing/Comments   Colorectal Cancer Screening  · Colonoscopy    · Fecal Occult Blood Test (FOBT)/Fecal Immunochemical Test (FIT)  · Fecal DNA/Cologuard Test  · Flexible Sigmoidoscopy Age: 54-65 years old   Colonoscopy: every 10 years (May be performed more frequently if at higher risk)  OR  FOBT/FIT: every 1 year  OR  Cologuard: every 3 years  OR  Sigmoidoscopy: every 5 years  Screening may be recommended earlier than age 48 if at higher risk for colorectal cancer  Also, an individualized decision between you and your healthcare provider will decide whether screening between the ages of 74-80 would be appropriate   Colonoscopy: Not on file  FOBT/FIT: Not on file  Cologuard: Not on file  Sigmoidoscopy: Not on file          Prostate Cancer Screening Individualized decision between patient and health care provider in men between ages of 53-78   Medicare will cover every 12 months beginning on the day after your 50th birthday PSA: No results in last 5 years     Screening Not Indicated     Hepatitis C Screening Once for adults born between [facility]  More frequently in patients at high risk for Hepatitis C Hep C Antibody: Not on file        Diabetes Screening 1-2 times per year if you're at risk for diabetes or have pre-diabetes Fasting glucose: 86 mg/dL   A1C: No results in last 5 years    Screening Current   Cholesterol Screening Once every 5 years if you don't have a lipid disorder  May order more often based on risk factors  Lipid panel: 03/12/2021    Screening Not Indicated  History Lipid Disorder      Other Preventive Screenings Covered by Medicare:  1  Abdominal Aortic Aneurysm (AAA) Screening: covered once if your at risk  You're considered to be at risk if you have a family history of AAA or a male between the age of 73-68 who smoking at least 100 cigarettes in your lifetime  2  Lung Cancer Screening: covers low dose CT scan once per year if you meet all of the following conditions: (1) Age 50-69; (2) No signs or symptoms of lung cancer; (3) Current smoker or have quit smoking within the last 15 years; (4) You have a tobacco smoking history of at least 30 pack years (packs per day x number of years you smoked); (5) You get a written order from a healthcare provider  3  Glaucoma Screening: covered annually if you're considered high risk: (1) You have diabetes OR (2) Family history of glaucoma OR (3)  aged 48 and older OR (3)  American aged 72 and older  3  Osteoporosis Screening: covered every 2 years if you meet one of the following conditions: (1) Have a vertebral abnormality; (2) On glucocorticoid therapy for more than 3 months; (3) Have primary hyperparathyroidism; (4) On osteoporosis medications and need to assess response to drug therapy  5  HIV Screening: covered annually if you're between the age of 12-76  Also covered annually if you are younger than 13 and older than 72 with risk factors for HIV infection  For pregnant patients, it is covered up to 3 times per pregnancy      Immunizations:  Immunization Recommendations   Influenza Vaccine Annual influenza vaccination during flu season is recommended for all persons aged >= 6 months who do not have contraindications Pneumococcal Vaccine (Prevnar and Pneumovax)  * Prevnar = PCV13  * Pneumovax = PPSV23 Adults 25-60 years old: 1-3 doses may be recommended based on certain risk factors  Adults 72 years old: Prevnar (PCV13) vaccine recommended followed by Pneumovax (PPSV23) vaccine  If already received PPSV23 since turning 65, then PCV13 recommended at least one year after PPSV23 dose  Hepatitis B Vaccine 3 dose series if at intermediate or high risk (ex: diabetes, end stage renal disease, liver disease)   Tetanus (Td) Vaccine - COST NOT COVERED BY MEDICARE PART B Following completion of primary series, a booster dose should be given every 10 years to maintain immunity against tetanus  Td may also be given as tetanus wound prophylaxis  Tdap Vaccine - COST NOT COVERED BY MEDICARE PART B Recommended at least once for all adults  For pregnant patients, recommended with each pregnancy  Shingles Vaccine (Shingrix) - COST NOT COVERED BY MEDICARE PART B  2 shot series recommended in those aged 48 and above     Health Maintenance Due:      Topic Date Due    HIV Screening  Completed     Immunizations Due:      Topic Date Due    Pneumococcal Vaccine: Pediatrics (0 to 5 Years) and At-Risk Patients (6 to 59 Years) (1 of 1 - PPSV23) Never done    COVID-19 Vaccine (1) Never done     Advance Directives   What are advance directives? Advance directives are legal documents that state your wishes and plans for medical care  These plans are made ahead of time in case you lose your ability to make decisions for yourself  Advance directives can apply to any medical decision, such as the treatments you want, and if you want to donate organs  What are the types of advance directives? There are many types of advance directives, and each state has rules about how to use them  You may choose a combination of any of the following:  · Living will: This is a written record of the treatment you want   You can also choose which treatments you do not want, which to limit, and which to stop at a certain time  This includes surgery, medicine, IV fluid, and tube feedings  · Durable power of  for healthcare [facility]): This is a written record that states who you want to make healthcare choices for you when you are unable to make them for yourself  This person, called a proxy, is usually a family member or a friend  You may choose more than 1 proxy  · Do not resuscitate (DNR) order:  A DNR order is used in case your heart stops beating or you stop breathing  It is a request not to have certain forms of treatment, such as CPR  A DNR order may be included in other types of advance directives  · Medical directive: This covers the care that you want if you are in a coma, near death, or unable to make decisions for yourself  You can list the treatments you want for each condition  Treatment may include pain medicine, surgery, blood transfusions, dialysis, IV or tube feedings, and a ventilator (breathing machine)  · Values history: This document has questions about your views, beliefs, and how you feel and think about life  This information can help others choose the care that you would choose  Why are advance directives important? An advance directive helps you control your care  Although spoken wishes may be used, it is better to have your wishes written down  Spoken wishes can be misunderstood, or not followed  Treatments may be given even if you do not want them  An advance directive may make it easier for your family to make difficult choices about your care  Cigarette Smoking and Your Health   Risks to your health if you smoke:  Nicotine and other chemicals found in tobacco damage every cell in your body  Even if you are a light smoker, you have an increased risk for cancer, heart disease, and lung disease  If you are pregnant or have diabetes, smoking increases your risk for complications     Benefits to your health if you stop smoking:   · You decrease respiratory symptoms such as coughing, wheezing, and shortness of breath  · You reduce your risk for cancers of the lung, mouth, throat, kidney, bladder, pancreas, stomach, and cervix  If you already have cancer, you increase the benefits of chemotherapy  You also reduce your risk for cancer returning or a second cancer from developing  · You reduce your risk for heart disease, blood clots, heart attack, and stroke  · You reduce your risk for lung infections, and diseases such as pneumonia, asthma, chronic bronchitis, and emphysema  · Your circulation improves  More oxygen can be delivered to your body  If you have diabetes, you lower your risk for complications, such as kidney, artery, and eye diseases  You also lower your risk for nerve damage  Nerve damage can lead to amputations, poor vision, and blindness  · You improve your body's ability to heal and to fight infections  For more information and support to stop smoking:   · [employer]  Phone: 0- 069 - [phone]  Web Address: Private Outlet  Weight Management   Why it is important to manage your weight:  Being overweight increases your risk of health conditions such as heart disease, high blood pressure, type 2 diabetes, and certain types of cancer  It can also increase your risk for osteoarthritis, sleep apnea, and other respiratory problems  Aim for a slow, steady weight loss  Even a small amount of weight loss can lower your risk of health problems  How to lose weight safely:  A safe and healthy way to lose weight is to eat fewer calories and get regular exercise  You can lose up about 1 pound a week by decreasing the number of calories you eat by 500 calories each day  Healthy meal plan for weight management:  A healthy meal plan includes a variety of foods, contains fewer calories, and helps you stay healthy  A healthy meal plan includes the following:  · Eat whole-grain foods more often  A healthy meal plan should contain fiber  Fiber is the part of grains, fruits, and vegetables that is not broken down by your body  Whole-grain foods are healthy and provide extra fiber in your diet  Some examples of whole-grain foods are whole-wheat breads and pastas, oatmeal, brown rice, and bulgur  · Eat a variety of vegetables every day  Include dark, leafy greens such as spinach, kale, halie greens, and mustard greens  Eat yellow and orange vegetables such as carrots, sweet potatoes, and winter squash  · Eat a variety of fruits every day  Choose fresh or canned fruit (canned in its own juice or light syrup) instead of juice  Fruit juice has very little or no fiber  · Eat low-fat dairy foods  Drink fat-free (skim) milk or 1% milk  Eat fat-free yogurt and low-fat cottage cheese  Try low-fat cheeses such as mozzarella and other reduced-fat cheeses  · Choose meat and other protein foods that are low in fat  Choose beans or other legumes such as split peas or lentils  Choose fish, skinless poultry (chicken or turkey), or lean cuts of red meat (beef or pork)  Before you cook meat or poultry, cut off any visible fat  · Use less fat and oil  Try baking foods instead of frying them  Add less fat, such as margarine, sour cream, regular salad dressing and mayonnaise to foods  Eat fewer high-fat foods  Some examples of high-fat foods include french fries, doughnuts, ice cream, and cakes  · Eat fewer sweets  Limit foods and drinks that are high in sugar  This includes candy, cookies, regular soda, and sweetened drinks  Exercise:  Exercise at least 30 minutes per day on most days of the week  Some examples of exercise include walking, biking, dancing, and swimming  You can also fit in more physical activity by taking the stairs instead of the elevator or parking farther away from stores  Ask your healthcare provider about the best exercise plan for you        © Copyright [employer] 2018 Information is for End User's use only and may not be sold, redistributed or otherwise used for commercial purposes  All illustrations and images included in CareNotes® are the copyrighted property of Roshan SERRANO  or LESLIE Reynolds    Portions of the record may have been created with voice recognition software  Occasional wrong word or "sound a like" substitutions may have occurred due to the inherent limitations of voice recognition software  Read the chart carefully and recognize, using context, where substitutions have occurred

## 2021-03-22 NOTE — ASSESSMENT & PLAN NOTE
Much better on lovaza and rosuvastatin  Continue the same  Advised pt he can have pasta on occasion but not all the time and smaller portions

## 2021-03-22 NOTE — ASSESSMENT & PLAN NOTE
Keep the f/u with neuro and discuss the bone aches, however pt was advised the rosuvastatin can cause cramping

## 2021-03-22 NOTE — PATIENT INSTRUCTIONS
Reviewed all with patient  Please make an appointment with your eye doctor for your blurred vision at times  We really do not want a put the diagnosis of Parkinson's on everything so let the specialist see you  Rosuvastatin can cause muscle aches so I wonder if that is not what your having in your left lower leg  I do not see a rash other than scratch marks on your legs so after your shower dry your body put a thin layer of mometasone cream on both areas that are itchy and leave the legs alone  If the pruritus does not resolve please call and let me know  I believe the weight loss is from your dietary modifications  You have done excellent in bringing your triglycerides down almost 500 points  It is not that you could never have bread or pasta again but you really seriously do have to limit this type of carbohydrates  Keep your follow-up appointment with Neurology  Follow-up here in about 4 months sooner for any problems  Work on stopping smoking as you do not need any extra problems  Please do the stool specimen and the additional labs but I suspect they will be normal   I will call with results  If you change your mind about the COVID vaccine let me know  Medicare Preventive Visit Patient Instructions  Thank you for completing your Welcome to Medicare Visit or Medicare Annual Wellness Visit today  Your next wellness visit will be due in one year (3/23/2022)  The screening/preventive services that you may require over the next 5-10 years are detailed below  Some tests may not apply to you based off risk factors and/or age  Screening tests ordered at today's visit but not completed yet may show as past due  Also, please note that scanned in results may not display below    Preventive Screenings:  Service Recommendations Previous Testing/Comments   Colorectal Cancer Screening  · Colonoscopy    · Fecal Occult Blood Test (FOBT)/Fecal Immunochemical Test (FIT)  · Fecal DNA/Cologuard Test  · Flexible Sigmoidoscopy Age: 54-65 years old   Colonoscopy: every 10 years (May be performed more frequently if at higher risk)  OR  FOBT/FIT: every 1 year  OR  Cologuard: every 3 years  OR  Sigmoidoscopy: every 5 years  Screening may be recommended earlier than age 48 if at higher risk for colorectal cancer  Also, an individualized decision between you and your healthcare provider will decide whether screening between the ages of 74-80 would be appropriate  Colonoscopy: Not on file  FOBT/FIT: Not on file  Cologuard: Not on file  Sigmoidoscopy: Not on file          Prostate Cancer Screening Individualized decision between patient and health care provider in men between ages of 53-78   Medicare will cover every 12 months beginning on the day after your 50th birthday PSA: No results in last 5 years     Screening Not Indicated     Hepatitis C Screening Once for adults born between [facility]  More frequently in patients at high risk for Hepatitis C Hep C Antibody: Not on file        Diabetes Screening 1-2 times per year if you're at risk for diabetes or have pre-diabetes Fasting glucose: 86 mg/dL   A1C: No results in last 5 years    Screening Current   Cholesterol Screening Once every 5 years if you don't have a lipid disorder  May order more often based on risk factors  Lipid panel: 03/12/2021    Screening Not Indicated  History Lipid Disorder      Other Preventive Screenings Covered by Medicare:  1  Abdominal Aortic Aneurysm (AAA) Screening: covered once if your at risk  You're considered to be at risk if you have a family history of AAA or a male between the age of 73-68 who smoking at least 100 cigarettes in your lifetime    2  Lung Cancer Screening: covers low dose CT scan once per year if you meet all of the following conditions: (1) Age 50-69; (2) No signs or symptoms of lung cancer; (3) Current smoker or have quit smoking within the last 15 years; (4) You have a tobacco smoking history of at least 30 pack years (packs per day x number of years you smoked); (5) You get a written order from a healthcare provider  3  Glaucoma Screening: covered annually if you're considered high risk: (1) You have diabetes OR (2) Family history of glaucoma OR (3)  aged 48 and older OR (3)  American aged 72 and older  3  Osteoporosis Screening: covered every 2 years if you meet one of the following conditions: (1) Have a vertebral abnormality; (2) On glucocorticoid therapy for more than 3 months; (3) Have primary hyperparathyroidism; (4) On osteoporosis medications and need to assess response to drug therapy  5  HIV Screening: covered annually if you're between the age of 12-76  Also covered annually if you are younger than 13 and older than 72 with risk factors for HIV infection  For pregnant patients, it is covered up to 3 times per pregnancy  Immunizations:  Immunization Recommendations   Influenza Vaccine Annual influenza vaccination during flu season is recommended for all persons aged >= 6 months who do not have contraindications   Pneumococcal Vaccine (Prevnar and Pneumovax)  * Prevnar = PCV13  * Pneumovax = PPSV23 Adults 25-60 years old: 1-3 doses may be recommended based on certain risk factors  Adults 72 years old: Prevnar (PCV13) vaccine recommended followed by Pneumovax (PPSV23) vaccine  If already received PPSV23 since turning 65, then PCV13 recommended at least one year after PPSV23 dose  Hepatitis B Vaccine 3 dose series if at intermediate or high risk (ex: diabetes, end stage renal disease, liver disease)   Tetanus (Td) Vaccine - COST NOT COVERED BY MEDICARE PART B Following completion of primary series, a booster dose should be given every 10 years to maintain immunity against tetanus  Td may also be given as tetanus wound prophylaxis  Tdap Vaccine - COST NOT COVERED BY MEDICARE PART B Recommended at least once for all adults  For pregnant patients, recommended with each pregnancy     Shingles Vaccine (Shingrix) - COST NOT COVERED BY MEDICARE PART B  2 shot series recommended in those aged 48 and above     Health Maintenance Due:      Topic Date Due    HIV Screening  Completed     Immunizations Due:      Topic Date Due    Pneumococcal Vaccine: Pediatrics (0 to 5 Years) and At-Risk Patients (6 to 59 Years) (1 of 1 - PPSV23) Never done    COVID-19 Vaccine (1) Never done     Advance Directives   What are advance directives? Advance directives are legal documents that state your wishes and plans for medical care  These plans are made ahead of time in case you lose your ability to make decisions for yourself  Advance directives can apply to any medical decision, such as the treatments you want, and if you want to donate organs  What are the types of advance directives? There are many types of advance directives, and each state has rules about how to use them  You may choose a combination of any of the following:  · Living will: This is a written record of the treatment you want  You can also choose which treatments you do not want, which to limit, and which to stop at a certain time  This includes surgery, medicine, IV fluid, and tube feedings  · Durable power of  for healthcare [facility]): This is a written record that states who you want to make healthcare choices for you when you are unable to make them for yourself  This person, called a proxy, is usually a family member or a friend  You may choose more than 1 proxy  · Do not resuscitate (DNR) order:  A DNR order is used in case your heart stops beating or you stop breathing  It is a request not to have certain forms of treatment, such as CPR  A DNR order may be included in other types of advance directives  · Medical directive: This covers the care that you want if you are in a coma, near death, or unable to make decisions for yourself  You can list the treatments you want for each condition   Treatment may include pain medicine, surgery, blood transfusions, dialysis, IV or tube feedings, and a ventilator (breathing machine)  · Values history: This document has questions about your views, beliefs, and how you feel and think about life  This information can help others choose the care that you would choose  Why are advance directives important? An advance directive helps you control your care  Although spoken wishes may be used, it is better to have your wishes written down  Spoken wishes can be misunderstood, or not followed  Treatments may be given even if you do not want them  An advance directive may make it easier for your family to make difficult choices about your care  Cigarette Smoking and Your Health   Risks to your health if you smoke:  Nicotine and other chemicals found in tobacco damage every cell in your body  Even if you are a light smoker, you have an increased risk for cancer, heart disease, and lung disease  If you are pregnant or have diabetes, smoking increases your risk for complications  Benefits to your health if you stop smoking:   · You decrease respiratory symptoms such as coughing, wheezing, and shortness of breath  · You reduce your risk for cancers of the lung, mouth, throat, kidney, bladder, pancreas, stomach, and cervix  If you already have cancer, you increase the benefits of chemotherapy  You also reduce your risk for cancer returning or a second cancer from developing  · You reduce your risk for heart disease, blood clots, heart attack, and stroke  · You reduce your risk for lung infections, and diseases such as pneumonia, asthma, chronic bronchitis, and emphysema  · Your circulation improves  More oxygen can be delivered to your body  If you have diabetes, you lower your risk for complications, such as kidney, artery, and eye diseases  You also lower your risk for nerve damage  Nerve damage can lead to amputations, poor vision, and blindness    · You improve your body's ability to heal and to fight infections  For more information and support to stop smoking:   · [employer]  Phone: 9- 995 - 355-5615  Web Address: www MOO.COM  Weight Management   Why it is important to manage your weight:  Being overweight increases your risk of health conditions such as heart disease, high blood pressure, type 2 diabetes, and certain types of cancer  It can also increase your risk for osteoarthritis, sleep apnea, and other respiratory problems  Aim for a slow, steady weight loss  Even a small amount of weight loss can lower your risk of health problems  How to lose weight safely:  A safe and healthy way to lose weight is to eat fewer calories and get regular exercise  You can lose up about 1 pound a week by decreasing the number of calories you eat by 500 calories each day  Healthy meal plan for weight management:  A healthy meal plan includes a variety of foods, contains fewer calories, and helps you stay healthy  A healthy meal plan includes the following:  · Eat whole-grain foods more often  A healthy meal plan should contain fiber  Fiber is the part of grains, fruits, and vegetables that is not broken down by your body  Whole-grain foods are healthy and provide extra fiber in your diet  Some examples of whole-grain foods are whole-wheat breads and pastas, oatmeal, brown rice, and bulgur  · Eat a variety of vegetables every day  Include dark, leafy greens such as spinach, kale, halie greens, and mustard greens  Eat yellow and orange vegetables such as carrots, sweet potatoes, and winter squash  · Eat a variety of fruits every day  Choose fresh or canned fruit (canned in its own juice or light syrup) instead of juice  Fruit juice has very little or no fiber  · Eat low-fat dairy foods  Drink fat-free (skim) milk or 1% milk  Eat fat-free yogurt and low-fat cottage cheese  Try low-fat cheeses such as mozzarella and other reduced-fat cheeses  · Choose meat and other protein foods that are low in fat  Choose beans or other legumes such as split peas or lentils  Choose fish, skinless poultry (chicken or turkey), or lean cuts of red meat (beef or pork)  Before you cook meat or poultry, cut off any visible fat  · Use less fat and oil  Try baking foods instead of frying them  Add less fat, such as margarine, sour cream, regular salad dressing and mayonnaise to foods  Eat fewer high-fat foods  Some examples of high-fat foods include french fries, doughnuts, ice cream, and cakes  · Eat fewer sweets  Limit foods and drinks that are high in sugar  This includes candy, cookies, regular soda, and sweetened drinks  Exercise:  Exercise at least 30 minutes per day on most days of the week  Some examples of exercise include walking, biking, dancing, and swimming  You can also fit in more physical activity by taking the stairs instead of the elevator or parking farther away from stores  Ask your healthcare provider about the best exercise plan for you  © Copyright [employer] 2018 Information is for End User's use only and may not be sold, redistributed or otherwise used for commercial purposes   All illustrations and images included in CareNotes® are the copyrighted property of A D A M , Inc  or [address] [place] [address] [facility]

## 2021-04-07 ENCOUNTER — TRANSCRIBE ORDERS (OUTPATIENT)
Dept: LAB | Facility: CLINIC | Age: 41
End: 2021-04-07

## 2021-04-07 ENCOUNTER — APPOINTMENT (OUTPATIENT)
Dept: LAB | Facility: CLINIC | Age: 41
End: 2021-04-07
Payer: COMMERCIAL

## 2021-04-07 DIAGNOSIS — R63.4 WEIGHT LOSS: ICD-10-CM

## 2021-04-07 LAB
BASOPHILS # BLD AUTO: 0.07 THOUSANDS/ΜL (ref 0–0.1)
BASOPHILS NFR BLD AUTO: 1 % (ref 0–1)
EOSINOPHIL # BLD AUTO: 0.31 THOUSAND/ΜL (ref 0–0.61)
EOSINOPHIL NFR BLD AUTO: 6 % (ref 0–6)
ERYTHROCYTE [DISTWIDTH] IN BLOOD BY AUTOMATED COUNT: 11.9 % (ref 11.6–15.1)
HCT VFR BLD AUTO: 46.6 % (ref 36.5–49.3)
HGB BLD-MCNC: 15.5 G/DL (ref 12–17)
IMM GRANULOCYTES # BLD AUTO: 0.01 THOUSAND/UL (ref 0–0.2)
IMM GRANULOCYTES NFR BLD AUTO: 0 % (ref 0–2)
LYMPHOCYTES # BLD AUTO: 1.51 THOUSANDS/ΜL (ref 0.6–4.47)
LYMPHOCYTES NFR BLD AUTO: 29 % (ref 14–44)
MCH RBC QN AUTO: 30.3 PG (ref 26.8–34.3)
MCHC RBC AUTO-ENTMCNC: 33.3 G/DL (ref 31.4–37.4)
MCV RBC AUTO: 91 FL (ref 82–98)
MONOCYTES # BLD AUTO: 0.44 THOUSAND/ΜL (ref 0.17–1.22)
MONOCYTES NFR BLD AUTO: 9 % (ref 4–12)
NEUTROPHILS # BLD AUTO: 2.82 THOUSANDS/ΜL (ref 1.85–7.62)
NEUTS SEG NFR BLD AUTO: 55 % (ref 43–75)
NRBC BLD AUTO-RTO: 0 /100 WBCS
PLATELET # BLD AUTO: 194 THOUSANDS/UL (ref 149–390)
PMV BLD AUTO: 10.7 FL (ref 8.9–12.7)
RBC # BLD AUTO: 5.11 MILLION/UL (ref 3.88–5.62)
TSH SERPL DL<=0.05 MIU/L-ACNC: 1.06 UIU/ML (ref 0.36–3.74)
WBC # BLD AUTO: 5.16 THOUSAND/UL (ref 4.31–10.16)

## 2021-04-07 PROCEDURE — 85025 COMPLETE CBC W/AUTO DIFF WBC: CPT

## 2021-04-07 PROCEDURE — 36415 COLL VENOUS BLD VENIPUNCTURE: CPT

## 2021-04-07 PROCEDURE — 84443 ASSAY THYROID STIM HORMONE: CPT

## 2021-05-19 DIAGNOSIS — E78.2 MIXED HYPERLIPIDEMIA: ICD-10-CM

## 2021-05-20 RX ORDER — ROSUVASTATIN CALCIUM 10 MG/1
TABLET, COATED ORAL
Qty: 30 TABLET | Refills: 1 | Status: SHIPPED | OUTPATIENT
Start: 2021-05-20 | End: 2021-07-27

## 2021-06-14 DIAGNOSIS — E78.2 MIXED HYPERLIPIDEMIA: ICD-10-CM

## 2021-06-15 RX ORDER — OMEGA-3-ACID ETHYL ESTERS 1 G/1
CAPSULE, LIQUID FILLED ORAL
Qty: 120 CAPSULE | Refills: 3 | Status: SHIPPED | OUTPATIENT
Start: 2021-06-15 | End: 2021-10-08

## 2021-07-27 DIAGNOSIS — E78.2 MIXED HYPERLIPIDEMIA: ICD-10-CM

## 2021-07-27 RX ORDER — ROSUVASTATIN CALCIUM 10 MG/1
TABLET, COATED ORAL
Qty: 30 TABLET | Refills: 1 | Status: SHIPPED | OUTPATIENT
Start: 2021-07-27 | End: 2021-09-18

## 2021-09-10 ENCOUNTER — RA CDI HCC (OUTPATIENT)
Dept: OTHER | Facility: HOSPITAL | Age: 41
End: 2021-09-10

## 2021-09-10 NOTE — PROGRESS NOTES
[facility]       Chart reviewed, no opportunity found: CHART REVIEWED, NO OPPORTUNITY FOUND                        Patients insurance company:  [employer] [facility] (Medicare Advantage and Commercial)

## 2021-09-10 NOTE — PROGRESS NOTES
[facility]       Chart reviewed, no opportunity found: CHART REVIEWED, NO OPPORTUNITY FOUND                        Patients insurance company:  [employer] [facility] (Medicare Advantage and Commercial)

## 2021-09-18 DIAGNOSIS — E78.2 MIXED HYPERLIPIDEMIA: ICD-10-CM

## 2021-09-18 RX ORDER — ROSUVASTATIN CALCIUM 10 MG/1
TABLET, COATED ORAL
Qty: 30 TABLET | Refills: 1 | Status: SHIPPED | OUTPATIENT
Start: 2021-09-18 | End: 2021-11-19

## 2021-10-08 DIAGNOSIS — E78.2 MIXED HYPERLIPIDEMIA: ICD-10-CM

## 2021-10-08 RX ORDER — OMEGA-3-ACID ETHYL ESTERS 1 G/1
CAPSULE, LIQUID FILLED ORAL
Qty: 120 CAPSULE | Refills: 3 | Status: SHIPPED | OUTPATIENT
Start: 2021-10-08 | End: 2022-02-04

## 2022-02-04 DIAGNOSIS — E78.2 MIXED HYPERLIPIDEMIA: ICD-10-CM

## 2022-02-04 RX ORDER — OMEGA-3-ACID ETHYL ESTERS 1 G/1
CAPSULE, LIQUID FILLED ORAL
Qty: 120 CAPSULE | Refills: 3 | Status: SHIPPED | OUTPATIENT
Start: 2022-02-04 | End: 2022-06-18

## 2022-03-02 DIAGNOSIS — E78.2 MIXED HYPERLIPIDEMIA: ICD-10-CM

## 2022-03-02 RX ORDER — ROSUVASTATIN CALCIUM 10 MG/1
TABLET, COATED ORAL
Qty: 30 TABLET | Refills: 2 | Status: SHIPPED | OUTPATIENT
Start: 2022-03-02 | End: 2022-06-07 | Stop reason: SDUPTHER

## 2022-06-07 DIAGNOSIS — E78.2 MIXED HYPERLIPIDEMIA: ICD-10-CM

## 2022-06-07 RX ORDER — ROSUVASTATIN CALCIUM 10 MG/1
10 TABLET, COATED ORAL DAILY
Qty: 30 TABLET | Refills: 0 | Status: SHIPPED | OUTPATIENT
Start: 2022-06-07 | End: 2022-07-05 | Stop reason: SDUPTHER

## 2022-06-07 NOTE — TELEPHONE ENCOUNTER
Patient's wife called to refill pt rosuvastatin (CRESTOR) 10 MG tablet  He is scheduled to establish with [facility] 7/7/22

## 2022-06-14 ENCOUNTER — RA CDI HCC (OUTPATIENT)
Dept: OTHER | Facility: HOSPITAL | Age: 42
End: 2022-06-14

## 2022-06-14 NOTE — PROGRESS NOTES
[facility]  coding opportunities       Chart reviewed, no opportunity found:   [address]        Patients Insurance     Medicare Insurance: [employer] Advantage

## 2022-07-05 DIAGNOSIS — E78.2 MIXED HYPERLIPIDEMIA: ICD-10-CM

## 2022-07-06 RX ORDER — BUPROPION HYDROCHLORIDE 75 MG/1
75 TABLET ORAL 2 TIMES DAILY
COMMUNITY
Start: 2022-05-26 | End: 2022-11-03 | Stop reason: SDUPTHER

## 2022-07-06 RX ORDER — ROSUVASTATIN CALCIUM 10 MG/1
10 TABLET, COATED ORAL DAILY
Qty: 30 TABLET | Refills: 0 | Status: SHIPPED | OUTPATIENT
Start: 2022-07-06 | End: 2022-08-01 | Stop reason: SDUPTHER

## 2022-07-06 RX ORDER — TRIAMCINOLONE ACETONIDE 0.25 MG/G
1 CREAM TOPICAL 2 TIMES DAILY
COMMUNITY
Start: 2022-05-26 | End: 2022-07-07 | Stop reason: ALTCHOICE

## 2022-07-06 RX ORDER — CARBIDOPA/LEVODOPA 25MG-250MG
1 TABLET ORAL 4 TIMES DAILY
COMMUNITY
Start: 2022-05-27 | End: 2023-07-31 | Stop reason: SDUPTHER

## 2022-07-07 ENCOUNTER — OFFICE VISIT (OUTPATIENT)
Dept: FAMILY MEDICINE CLINIC | Facility: CLINIC | Age: 42
End: 2022-07-07
Payer: COMMERCIAL

## 2022-07-07 VITALS
HEIGHT: 70 IN | TEMPERATURE: 97.4 F | HEART RATE: 96 BPM | RESPIRATION RATE: 12 BRPM | DIASTOLIC BLOOD PRESSURE: 90 MMHG | BODY MASS INDEX: 25.45 KG/M2 | SYSTOLIC BLOOD PRESSURE: 142 MMHG | OXYGEN SATURATION: 98 % | WEIGHT: 177.8 LBS

## 2022-07-07 DIAGNOSIS — G20 PARKINSON'S DISEASE (HCC): ICD-10-CM

## 2022-07-07 DIAGNOSIS — Z00.00 MEDICARE ANNUAL WELLNESS VISIT, SUBSEQUENT: Primary | ICD-10-CM

## 2022-07-07 DIAGNOSIS — R53.82 CHRONIC FATIGUE: ICD-10-CM

## 2022-07-07 DIAGNOSIS — F32.0 CURRENT MILD EPISODE OF MAJOR DEPRESSIVE DISORDER WITHOUT PRIOR EPISODE (HCC): ICD-10-CM

## 2022-07-07 DIAGNOSIS — Z00.00 HEALTHCARE MAINTENANCE: ICD-10-CM

## 2022-07-07 DIAGNOSIS — I10 HYPERTENSION, UNSPECIFIED TYPE: ICD-10-CM

## 2022-07-07 DIAGNOSIS — M25.50 POLYARTHRALGIA: ICD-10-CM

## 2022-07-07 PROCEDURE — 3725F SCREEN DEPRESSION PERFORMED: CPT | Performed by: NURSE PRACTITIONER

## 2022-07-07 PROCEDURE — G0439 PPPS, SUBSEQ VISIT: HCPCS | Performed by: NURSE PRACTITIONER

## 2022-07-07 PROCEDURE — 99214 OFFICE O/P EST MOD 30 MIN: CPT | Performed by: NURSE PRACTITIONER

## 2022-07-07 NOTE — PATIENT INSTRUCTIONS
Medicare Preventive Visit Patient Instructions  Thank you for completing your Welcome to Medicare Visit or Medicare Annual Wellness Visit today  Your next wellness visit will be due in one year (7/8/2023)  The screening/preventive services that you may require over the next 5-10 years are detailed below  Some tests may not apply to you based off risk factors and/or age  Screening tests ordered at today's visit but not completed yet may show as past due  Also, please note that scanned in results may not display below  Preventive Screenings:  Service Recommendations Previous Testing/Comments   Colorectal Cancer Screening  · Colonoscopy    · Fecal Occult Blood Test (FOBT)/Fecal Immunochemical Test (FIT)  · Fecal DNA/Cologuard Test  · Flexible Sigmoidoscopy Age: 54-65 years old   Colonoscopy: every 10 years (May be performed more frequently if at higher risk)  OR  FOBT/FIT: every 1 year  OR  Cologuard: every 3 years  OR  Sigmoidoscopy: every 5 years  Screening may be recommended earlier than age 48 if at higher risk for colorectal cancer  Also, an individualized decision between you and your healthcare provider will decide whether screening between the ages of 74-80 would be appropriate   Colonoscopy: Not on file  FOBT/FIT: Not on file  Cologuard: Not on file  Sigmoidoscopy: Not on file          Prostate Cancer Screening Individualized decision between patient and health care provider in men between ages of 53-78   Medicare will cover every 12 months beginning on the day after your 50th birthday PSA: No results in last 5 years     Screening Not Indicated     Hepatitis C Screening Once for adults born between [facility]  More frequently in patients at high risk for Hepatitis C Hep C Antibody: Not on file        Diabetes Screening 1-2 times per year if you're at risk for diabetes or have pre-diabetes Fasting glucose: 86 mg/dL   A1C: No results in last 5 years        Cholesterol Screening Once every 5 years if you don't have a lipid disorder  May order more often based on risk factors  Lipid panel: 03/12/2021    Screening Not Indicated  History Lipid Disorder      Other Preventive Screenings Covered by Medicare:  1  Abdominal Aortic Aneurysm (AAA) Screening: covered once if your at risk  You're considered to be at risk if you have a family history of AAA or a male between the age of 73-68 who smoking at least 100 cigarettes in your lifetime  2  Lung Cancer Screening: covers low dose CT scan once per year if you meet all of the following conditions: (1) Age 50-69; (2) No signs or symptoms of lung cancer; (3) Current smoker or have quit smoking within the last 15 years; (4) You have a tobacco smoking history of at least 30 pack years (packs per day x number of years you smoked); (5) You get a written order from a healthcare provider  3  Glaucoma Screening: covered annually if you're considered high risk: (1) You have diabetes OR (2) Family history of glaucoma OR (3)  aged 48 and older OR (3)  American aged 72 and older  3  Osteoporosis Screening: covered every 2 years if you meet one of the following conditions: (1) Have a vertebral abnormality; (2) On glucocorticoid therapy for more than 3 months; (3) Have primary hyperparathyroidism; (4) On osteoporosis medications and need to assess response to drug therapy  5  HIV Screening: covered annually if you're between the age of 12-76  Also covered annually if you are younger than 13 and older than 72 with risk factors for HIV infection  For pregnant patients, it is covered up to 3 times per pregnancy      Immunizations:  Immunization Recommendations   Influenza Vaccine Annual influenza vaccination during flu season is recommended for all persons aged >= 6 months who do not have contraindications   Pneumococcal Vaccine (Prevnar and Pneumovax)  * Prevnar = PCV13  * Pneumovax = PPSV23 Adults 25-60 years old: 1-3 doses may be recommended based on certain risk factors  Adults 72 years old: Prevnar (PCV13) vaccine recommended followed by Pneumovax (PPSV23) vaccine  If already received PPSV23 since turning 65, then PCV13 recommended at least one year after PPSV23 dose  Hepatitis B Vaccine 3 dose series if at intermediate or high risk (ex: diabetes, end stage renal disease, liver disease)   Tetanus (Td) Vaccine - COST NOT COVERED BY MEDICARE PART B Following completion of primary series, a booster dose should be given every 10 years to maintain immunity against tetanus  Td may also be given as tetanus wound prophylaxis  Tdap Vaccine - COST NOT COVERED BY MEDICARE PART B Recommended at least once for all adults  For pregnant patients, recommended with each pregnancy  Shingles Vaccine (Shingrix) - COST NOT COVERED BY MEDICARE PART B  2 shot series recommended in those aged 48 and above     Health Maintenance Due:      Topic Date Due    Hepatitis C Screening  Never done    HIV Screening  Completed     Immunizations Due:      Topic Date Due    COVID-19 Vaccine (1) Never done    Pneumococcal Vaccine: Pediatrics (0 to 5 Years) and At-Risk Patients (6 to 59 Years) (1 - PCV) Never done    Influenza Vaccine (1) 09/01/2022     Advance Directives   What are advance directives? Advance directives are legal documents that state your wishes and plans for medical care  These plans are made ahead of time in case you lose your ability to make decisions for yourself  Advance directives can apply to any medical decision, such as the treatments you want, and if you want to donate organs  What are the types of advance directives? There are many types of advance directives, and each state has rules about how to use them  You may choose a combination of any of the following:  · Living will: This is a written record of the treatment you want  You can also choose which treatments you do not want, which to limit, and which to stop at a certain time   This includes surgery, medicine, IV fluid, and tube feedings  · Durable power of  for healthcare [facility]): This is a written record that states who you want to make healthcare choices for you when you are unable to make them for yourself  This person, called a proxy, is usually a family member or a friend  You may choose more than 1 proxy  · Do not resuscitate (DNR) order:  A DNR order is used in case your heart stops beating or you stop breathing  It is a request not to have certain forms of treatment, such as CPR  A DNR order may be included in other types of advance directives  · Medical directive: This covers the care that you want if you are in a coma, near death, or unable to make decisions for yourself  You can list the treatments you want for each condition  Treatment may include pain medicine, surgery, blood transfusions, dialysis, IV or tube feedings, and a ventilator (breathing machine)  · Values history: This document has questions about your views, beliefs, and how you feel and think about life  This information can help others choose the care that you would choose  Why are advance directives important? An advance directive helps you control your care  Although spoken wishes may be used, it is better to have your wishes written down  Spoken wishes can be misunderstood, or not followed  Treatments may be given even if you do not want them  An advance directive may make it easier for your family to make difficult choices about your care  Cigarette Smoking and Your Health   Risks to your health if you smoke:  Nicotine and other chemicals found in tobacco damage every cell in your body  Even if you are a light smoker, you have an increased risk for cancer, heart disease, and lung disease  If you are pregnant or have diabetes, smoking increases your risk for complications  Benefits to your health if you stop smoking:   · You decrease respiratory symptoms such as coughing, wheezing, and shortness of breath     · You reduce your risk for cancers of the lung, mouth, throat, kidney, bladder, pancreas, stomach, and cervix  If you already have cancer, you increase the benefits of chemotherapy  You also reduce your risk for cancer returning or a second cancer from developing  · You reduce your risk for heart disease, blood clots, heart attack, and stroke  · You reduce your risk for lung infections, and diseases such as pneumonia, asthma, chronic bronchitis, and emphysema  · Your circulation improves  More oxygen can be delivered to your body  If you have diabetes, you lower your risk for complications, such as kidney, artery, and eye diseases  You also lower your risk for nerve damage  Nerve damage can lead to amputations, poor vision, and blindness  · You improve your body's ability to heal and to fight infections  For more information and support to stop smoking:   · [employer]  Phone: 2- 630 - 015-0522  Web Address: [employer]  [facility] 2018 Information is for End User's use only and may not be sold, redistributed or otherwise used for commercial purposes   All illustrations and images included in CareNotes® are the copyrighted property of A D A M , Inc  or [address] [place] [address] [facility]

## 2022-07-07 NOTE — PROGRESS NOTES
Assessment and Plan:     Problem List Items Addressed This Visit        Cardiovascular and Mediastinum    Hypertension     BP elevated in office today  As per patient was diagnosed back in 2014, was started on an anti-hypertensive, but was discovered that BPs were only elevated during office visits, and was taken off medication  Patient denies symptoms related to high BP  Discussed and educated patient how to properly take BP at home, record findings, and bring to next visit  To decrease sodium intake, increase hydration  Will follow-up in a month or sooner if gets consistently high readings at home  Nervous and Auditory    Parkinson's disease [facility]) - early onset     Is followed by neurology out of [place] [facility]  Sees them 3-4 times/year, last seen in June  Notes chronic fatigue and joint pain  Discussed medication management for joint pain, but patient declining at this time  Has mild tremor in right hand  To continue with neuro follow-up  Other    Current mild episode of major depressive disorder without prior episode ([facility] [address] )     Diagnosed by neurologist last month, started on Wellbutrin  As per patient, does not feel like medication has been effective  Denies SI/HI  Has follow-up with neuro next month for re-eval              Other Visit Diagnoses     Medicare annual wellness visit, subsequent    -  Primary    Polyarthralgia        Blood work ordered to rule-out other causes  Discussed keeping healthy weight, exercise as tolerated, medication management, but patient declined at this time      Relevant Orders    Lyme Antibody Profile with reflex to WB    ANDREA Screen w/ Reflex to Titer/Pattern    RF Screen w/ Reflex to Titer    C-reactive protein    Chronic fatigue        Has not had recent blood work, will order to rule-out other factors    Relevant Orders    CBC and differential    Comprehensive metabolic panel    TSH, 3rd generation with Free T4 reflex    Vitamin D 25 hydroxy    Lyme Antibody Profile with reflex to WB    ANDREA Screen w/ Reflex to Titer/Pattern    RF Screen w/ Reflex to Titer    C-reactive protein    Healthcare maintenance        Relevant Orders    HEMOGLOBIN A1C W/ EAG ESTIMATION    Lipid panel        BMI Counseling: Body mass index is 25 51 kg/m²  The BMI is above normal  Nutrition recommendations include decreasing portion sizes, encouraging healthy choices of fruits and vegetables, consuming healthier snacks, limiting drinks that contain sugar, moderation in carbohydrate intake, increasing intake of lean protein, reducing intake of saturated and trans fat and reducing intake of cholesterol  Exercise recommendations include strength training exercises  No pharmacotherapy was ordered  Rationale for BMI follow-up plan is due to patient being overweight or obese  Depression Screening and Follow-up Plan: Patient was screened for depression during today's encounter  They screened negative with a PHQ-2 score of 0  Preventive health issues were discussed with patient, and age appropriate screening tests were ordered as noted in patient's After Visit Summary  Personalized health advice and appropriate referrals for health education or preventive services given if needed, as noted in patient's After Visit Summary  History of Present Illness:     Patient presents for a Medicare Wellness Visit    Patient presents to office accompanied by wife for annual Medicare Wellness visit  Last visit was a year ago  Has not had recent blood work  Patient with history of Parkinson's, diagnosed in 20's  Follows neuro out of [place] [facility]  Is seen every 3-4 months  Last seen in June  During last appointment in June was diagnosed with depression and started on Wellbutrin  Patient states medication has not been effective  As per patient, was told the decreased dopamine due to Parkinson's is causing his depression and placed him on the Wellbutrin    Patient currently denies SI/HI, notes chronic fatigue and decreased energy  Does have follow-up appointment next month and will bring up medication effectiveness  Patient notes pain in multiple joints  States it is worse in the morning and if he increases his physical activity  Denies joint swelling, fever, chills  Denies recent tick bite or rash  Patient Care Team:  Carmelina Brown as PCP - General (Family Medicine)     Review of Systems:     Review of Systems   Constitutional: Positive for fatigue  Negative for chills, fever and unexpected weight change  HENT: Negative for congestion, ear pain, sore throat and trouble swallowing  Eyes: Negative for photophobia and visual disturbance  Respiratory: Negative for cough and shortness of breath  Cardiovascular: Negative for chest pain, palpitations and leg swelling  Gastrointestinal: Negative for abdominal pain, constipation, nausea and vomiting  Endocrine: Negative for polydipsia, polyphagia and polyuria  Genitourinary: Negative for decreased urine volume, dysuria, flank pain, frequency and urgency  Musculoskeletal: Positive for arthralgias (multiple joints) and back pain  Negative for joint swelling, myalgias and neck stiffness  Skin: Negative for color change and rash  Neurological: Negative for dizziness, seizures, weakness, light-headedness, numbness and headaches  Notes tremors in hand   Psychiatric/Behavioral: Positive for dysphoric mood and sleep disturbance  Negative for agitation, behavioral problems, confusion and suicidal ideas  The patient is not nervous/anxious           Problem List:     Patient Active Problem List   Diagnosis    Parkinson's disease ([facility] [address] ) - early onset    Current every day smoker    Hypertriglyceridemia    Hypertension    Current mild episode of major depressive disorder without prior episode [facility])      Past Medical and Surgical History:     Past Medical History:   Diagnosis Date    Parkinson's disease ([facility] [address] )      History reviewed  No pertinent surgical history     Family History:     Family History   Problem Relation Age of Onset    Hypertension Mother     Lung cancer Father     Parkinsonism Paternal Uncle       Social History:     Social History     Socioeconomic History    Marital status: /Civil Union     Spouse name: None    Number of children: None    Years of education: None    Highest education level: None   Occupational History    None   Tobacco Use    Smoking status: Former Smoker     Packs/day: 0 50     Years: 15 00     Pack years: 7 50     Types: Cigarettes     Quit date: 2021     Years since quittin 6    Smokeless tobacco: Never Used   Vaping Use    Vaping Use: Some days    Substances: Nicotine   Substance and Sexual Activity    Alcohol use: Yes     Comment:     Drug use: No    Sexual activity: None   Other Topics Concern    None   Social History Narrative    None     Social Determinants of Health     Financial Resource Strain: Not on file   Food Insecurity: Not on file   Transportation Needs: Not on file   Physical Activity: Not on file   Stress: Not on file   Social Connections: Not on file   Intimate Partner Violence: Not on file   Housing Stability: Not on file      Medications and Allergies:     Current Outpatient Medications   Medication Sig Dispense Refill    buPROPion (WELLBUTRIN) 75 mg tablet Take 75 mg by mouth 2 (two) times a day      carbidopa-levodopa (SINEMET)  mg per tablet Take 1 tablet by mouth 4 (four) times a day      entacapone (COMTAN) 200 mg tablet take 1 tablet by mouth five times a day      hydrOXYzine HCL (ATARAX) 50 mg tablet Take 50 mg by mouth every evening      omega-3-acid ethyl esters (LOVAZA) 1 g capsule take 2 capsules by mouth twice a day 120 capsule 0    rosuvastatin (CRESTOR) 10 MG tablet Take 1 tablet (10 mg total) by mouth daily 30 tablet 0     No current facility-administered medications for this visit  No Known Allergies   Immunizations:     Immunization History   Administered Date(s) Administered    DTaP 5 04/01/2014      Health Maintenance:         Topic Date Due    Hepatitis C Screening  Never done    HIV Screening  Completed         Topic Date Due    COVID-19 Vaccine (1) Never done    Influenza Vaccine (1) 09/01/2022      Medicare Screening Tests and Risk Assessments:     Marleny Buckley is here for his Subsequent Wellness visit  Health Risk Assessment:   Patient rates overall health as good  Patient feels that their physical health rating is slightly worse  Patient is satisfied with their life  Eyesight was rated as same  Hearing was rated as same  Patient feels that their emotional and mental health rating is same  Patients states they are never, rarely angry  Patient states they are always unusually tired/fatigued  Patient states that he has experienced no weight loss or gain in last 6 months  Depression Screening:   PHQ-2 Score: 0      Fall Risk Screening: In the past year, patient has experienced: no history of falling in past year      Home Safety:  Patient does not have trouble with stairs inside or outside of their home  Patient has working smoke alarms and has working carbon monoxide detector  Home safety hazards include: none  Nutrition:   Current diet is Regular  Medications:   Patient is currently taking over-the-counter supplements  OTC medications include: see medication list  Patient is able to manage medications  Activities of Daily Living (ADLs)/Instrumental Activities of Daily Living (IADLs):   Walk and transfer into and out of bed and chair?: Yes  Dress and groom yourself?: Yes    Bathe or shower yourself?: Yes    Feed yourself?  Yes  Do your laundry/housekeeping?: Yes  Manage your money, pay your bills and track your expenses?: Yes  Make your own meals?: Yes    Do your own shopping?: Yes    ADL comments: Lives in single story home, can drive short distances    Previous Hospitalizations:   Any hospitalizations or ED visits within the last 12 months?: No      Advance Care Planning:   Living will: No      PREVENTIVE SCREENINGS      Cardiovascular Screening:    General: Screening Not Indicated and History Lipid Disorder      Colorectal Cancer Screening:     General: Screening Not Indicated      Prostate Cancer Screening:    General: Screening Not Indicated      Osteoporosis Screening:    General: Screening Not Indicated      Abdominal Aortic Aneurysm (AAA) Screening:    Risk factors include: tobacco use        Lung Cancer Screening:     General: Screening Not Indicated    Screening, Brief Intervention, and Referral to Treatment (SBIRT)    Screening  Typical number of drinks in a day: 0  Typical number of drinks in a week: 0  Interpretation: Low risk drinking behavior  Single Item Drug Screening:  How often have you used an illegal drug (including marijuana) or a prescription medication for non-medical reasons in the past year? never    Single Item Drug Screen Score: 0  Interpretation: Negative screen for possible drug use disorder    Other Counseling Topics:   Car/seat belt/driving safety, skin self-exam and calcium and vitamin D intake and regular weightbearing exercise  No exam data present     Physical Exam:     /90   Pulse 96   Temp (!) 97 4 °F (36 3 °C)   Resp 12   Ht 5' 10" (1 778 m)   Wt 80 6 kg (177 lb 12 8 oz)   SpO2 98%   BMI 25 51 kg/m²     Physical Exam  Vitals reviewed  Constitutional:       General: He is not in acute distress  Appearance: Normal appearance  He is well-developed  He is not ill-appearing  HENT:      Head: Normocephalic and atraumatic  Right Ear: Tympanic membrane, ear canal and external ear normal  There is no impacted cerumen  Left Ear: Tympanic membrane, ear canal and external ear normal  There is no impacted cerumen  Nose: Nose normal  No congestion or rhinorrhea        Mouth/Throat: Mouth: Mucous membranes are moist       Pharynx: Oropharynx is clear  No oropharyngeal exudate or posterior oropharyngeal erythema  Eyes:      Conjunctiva/sclera: Conjunctivae normal       Pupils: Pupils are equal, round, and reactive to light  Cardiovascular:      Rate and Rhythm: Normal rate and regular rhythm  Pulses: Normal pulses  Heart sounds: Normal heart sounds  No murmur heard  Pulmonary:      Effort: Pulmonary effort is normal  No respiratory distress  Breath sounds: Normal breath sounds  Abdominal:      Palpations: Abdomen is soft  Tenderness: There is no abdominal tenderness  Musculoskeletal:         General: Normal range of motion  Cervical back: Normal range of motion and neck supple  No tenderness  Right lower leg: No edema  Left lower leg: No edema  Lymphadenopathy:      Cervical: No cervical adenopathy  Skin:     General: Skin is warm and dry  Capillary Refill: Capillary refill takes less than 2 seconds  Neurological:      Mental Status: He is alert and oriented to person, place, and time  Mental status is at baseline  Motor: No weakness        Gait: Gait normal       Comments: Mild tremor noted in right hand   Psychiatric:         Mood and Affect: Mood normal          Behavior: Behavior normal           LESLIE Pinedo

## 2022-07-07 NOTE — ASSESSMENT & PLAN NOTE
BP elevated in office today  As per patient was diagnosed back in 2014, was started on an anti-hypertensive, but was discovered that BPs were only elevated during office visits, and was taken off medication  Patient denies symptoms related to high BP  Discussed and educated patient how to properly take BP at home, record findings, and bring to next visit  To decrease sodium intake, increase hydration  Will follow-up in a month or sooner if gets consistently high readings at home

## 2022-07-07 NOTE — ASSESSMENT & PLAN NOTE
Diagnosed by neurologist last month, started on Wellbutrin  As per patient, does not feel like medication has been effective  Denies SI/HI    Has follow-up with neuro next month for re-eval

## 2022-07-07 NOTE — ASSESSMENT & PLAN NOTE
Is followed by neurology out of [place] [employer]  Sees them 3-4 times/year, last seen in June  Notes chronic fatigue and joint pain  Discussed medication management for joint pain, but patient declining at this time  Has mild tremor in right hand  To continue with neuro follow-up

## 2022-07-20 DIAGNOSIS — E78.2 MIXED HYPERLIPIDEMIA: ICD-10-CM

## 2022-07-20 RX ORDER — OMEGA-3-ACID ETHYL ESTERS 1 G/1
2 CAPSULE, LIQUID FILLED ORAL 2 TIMES DAILY
Qty: 120 CAPSULE | Refills: 0 | Status: SHIPPED | OUTPATIENT
Start: 2022-07-20 | End: 2022-08-18 | Stop reason: SDUPTHER

## 2022-08-01 DIAGNOSIS — E78.2 MIXED HYPERLIPIDEMIA: ICD-10-CM

## 2022-08-02 RX ORDER — ROSUVASTATIN CALCIUM 10 MG/1
10 TABLET, COATED ORAL DAILY
Qty: 30 TABLET | Refills: 0 | Status: SHIPPED | OUTPATIENT
Start: 2022-08-02 | End: 2022-08-25 | Stop reason: SDUPTHER

## 2022-08-09 ENCOUNTER — APPOINTMENT (OUTPATIENT)
Dept: LAB | Facility: CLINIC | Age: 42
End: 2022-08-09
Payer: COMMERCIAL

## 2022-08-09 DIAGNOSIS — R53.82 CHRONIC FATIGUE: ICD-10-CM

## 2022-08-09 DIAGNOSIS — Z00.00 HEALTHCARE MAINTENANCE: ICD-10-CM

## 2022-08-09 DIAGNOSIS — M25.50 POLYARTHRALGIA: ICD-10-CM

## 2022-08-09 LAB
25(OH)D3 SERPL-MCNC: 31.6 NG/ML (ref 30–100)
ALBUMIN SERPL BCP-MCNC: 4.1 G/DL (ref 3.5–5)
ALP SERPL-CCNC: 67 U/L (ref 46–116)
ALT SERPL W P-5'-P-CCNC: 27 U/L (ref 12–78)
ANION GAP SERPL CALCULATED.3IONS-SCNC: 0 MMOL/L (ref 4–13)
AST SERPL W P-5'-P-CCNC: 23 U/L (ref 5–45)
BASOPHILS # BLD AUTO: 0.06 THOUSANDS/ΜL (ref 0–0.1)
BASOPHILS NFR BLD AUTO: 1 % (ref 0–1)
BILIRUB SERPL-MCNC: 0.57 MG/DL (ref 0.2–1)
BUN SERPL-MCNC: 13 MG/DL (ref 5–25)
CALCIUM SERPL-MCNC: 9 MG/DL (ref 8.3–10.1)
CHLORIDE SERPL-SCNC: 109 MMOL/L (ref 96–108)
CHOLEST SERPL-MCNC: 123 MG/DL
CO2 SERPL-SCNC: 30 MMOL/L (ref 21–32)
CREAT SERPL-MCNC: 1.12 MG/DL (ref 0.6–1.3)
CRP SERPL QL: <3 MG/L
EOSINOPHIL # BLD AUTO: 0.21 THOUSAND/ΜL (ref 0–0.61)
EOSINOPHIL NFR BLD AUTO: 4 % (ref 0–6)
ERYTHROCYTE [DISTWIDTH] IN BLOOD BY AUTOMATED COUNT: 12 % (ref 11.6–15.1)
EST. AVERAGE GLUCOSE BLD GHB EST-MCNC: 111 MG/DL
GFR SERPL CREATININE-BSD FRML MDRD: 81 ML/MIN/1.73SQ M
GLUCOSE P FAST SERPL-MCNC: 112 MG/DL (ref 65–99)
HBA1C MFR BLD: 5.5 %
HCT VFR BLD AUTO: 46.7 % (ref 36.5–49.3)
HDLC SERPL-MCNC: 30 MG/DL
HGB BLD-MCNC: 15.1 G/DL (ref 12–17)
IMM GRANULOCYTES # BLD AUTO: 0.01 THOUSAND/UL (ref 0–0.2)
IMM GRANULOCYTES NFR BLD AUTO: 0 % (ref 0–2)
LDLC SERPL CALC-MCNC: 64 MG/DL (ref 0–100)
LYMPHOCYTES # BLD AUTO: 1.43 THOUSANDS/ΜL (ref 0.6–4.47)
LYMPHOCYTES NFR BLD AUTO: 30 % (ref 14–44)
MCH RBC QN AUTO: 29.1 PG (ref 26.8–34.3)
MCHC RBC AUTO-ENTMCNC: 32.3 G/DL (ref 31.4–37.4)
MCV RBC AUTO: 90 FL (ref 82–98)
MONOCYTES # BLD AUTO: 0.51 THOUSAND/ΜL (ref 0.17–1.22)
MONOCYTES NFR BLD AUTO: 11 % (ref 4–12)
NEUTROPHILS # BLD AUTO: 2.51 THOUSANDS/ΜL (ref 1.85–7.62)
NEUTS SEG NFR BLD AUTO: 54 % (ref 43–75)
NONHDLC SERPL-MCNC: 93 MG/DL
NRBC BLD AUTO-RTO: 0 /100 WBCS
PLATELET # BLD AUTO: 200 THOUSANDS/UL (ref 149–390)
PMV BLD AUTO: 10.1 FL (ref 8.9–12.7)
POTASSIUM SERPL-SCNC: 4.1 MMOL/L (ref 3.5–5.3)
PROT SERPL-MCNC: 7.5 G/DL (ref 6.4–8.4)
RBC # BLD AUTO: 5.19 MILLION/UL (ref 3.88–5.62)
SODIUM SERPL-SCNC: 139 MMOL/L (ref 135–147)
TRIGL SERPL-MCNC: 146 MG/DL
TSH SERPL DL<=0.05 MIU/L-ACNC: 1.54 UIU/ML (ref 0.45–4.5)
WBC # BLD AUTO: 4.73 THOUSAND/UL (ref 4.31–10.16)

## 2022-08-09 PROCEDURE — 84443 ASSAY THYROID STIM HORMONE: CPT

## 2022-08-09 PROCEDURE — 85025 COMPLETE CBC W/AUTO DIFF WBC: CPT

## 2022-08-09 PROCEDURE — 80053 COMPREHEN METABOLIC PANEL: CPT

## 2022-08-09 PROCEDURE — 82306 VITAMIN D 25 HYDROXY: CPT

## 2022-08-09 PROCEDURE — 83036 HEMOGLOBIN GLYCOSYLATED A1C: CPT

## 2022-08-09 PROCEDURE — 86618 LYME DISEASE ANTIBODY: CPT

## 2022-08-09 PROCEDURE — 86038 ANTINUCLEAR ANTIBODIES: CPT

## 2022-08-09 PROCEDURE — 80061 LIPID PANEL: CPT

## 2022-08-09 PROCEDURE — 86430 RHEUMATOID FACTOR TEST QUAL: CPT

## 2022-08-09 PROCEDURE — 36415 COLL VENOUS BLD VENIPUNCTURE: CPT

## 2022-08-09 PROCEDURE — 86140 C-REACTIVE PROTEIN: CPT

## 2022-08-10 LAB
B BURGDOR IGG+IGM SER-ACNC: <0.2 AI
RHEUMATOID FACT SER QL LA: NEGATIVE
RYE IGE QN: NEGATIVE

## 2022-08-11 ENCOUNTER — OFFICE VISIT (OUTPATIENT)
Dept: FAMILY MEDICINE CLINIC | Facility: CLINIC | Age: 42
End: 2022-08-11
Payer: COMMERCIAL

## 2022-08-11 VITALS
DIASTOLIC BLOOD PRESSURE: 100 MMHG | RESPIRATION RATE: 14 BRPM | SYSTOLIC BLOOD PRESSURE: 140 MMHG | HEART RATE: 97 BPM | HEIGHT: 70 IN | OXYGEN SATURATION: 98 % | BODY MASS INDEX: 25.43 KG/M2 | WEIGHT: 177.6 LBS

## 2022-08-11 DIAGNOSIS — I10 PRIMARY HYPERTENSION: Primary | ICD-10-CM

## 2022-08-11 DIAGNOSIS — R10.12 LUQ PAIN: ICD-10-CM

## 2022-08-11 PROCEDURE — 3080F DIAST BP >= 90 MM HG: CPT | Performed by: NURSE PRACTITIONER

## 2022-08-11 PROCEDURE — 3077F SYST BP >= 140 MM HG: CPT | Performed by: NURSE PRACTITIONER

## 2022-08-11 PROCEDURE — 99214 OFFICE O/P EST MOD 30 MIN: CPT | Performed by: NURSE PRACTITIONER

## 2022-08-11 PROCEDURE — 3725F SCREEN DEPRESSION PERFORMED: CPT | Performed by: NURSE PRACTITIONER

## 2022-08-11 RX ORDER — LISINOPRIL 10 MG/1
10 TABLET ORAL DAILY
Qty: 90 TABLET | Refills: 1 | Status: SHIPPED | OUTPATIENT
Start: 2022-08-11 | End: 2022-09-22 | Stop reason: DRUGHIGH

## 2022-08-11 NOTE — PROGRESS NOTES
Assessment/Plan:    Hypertension  Blood pressure elevated again in office today  Patient has been monitoring blood pressures at home  As per wife, have had readings 130's-140's over 80-90  Patient states he was previously on medication for blood pressure, but states he stopped  Will initiate lisinopril 10 mg  Advised to adhere to low-sodium diet  To continue to monitor blood pressures at home, return in a month, repeat CMP  Diagnoses and all orders for this visit:    Primary hypertension  -     lisinopril (ZESTRIL) 10 mg tablet; Take 1 tablet (10 mg total) by mouth daily  -     Comprehensive metabolic panel; Future    LUQ pain  Comments:  Discussed foods that decrease bloating, advised on smaller, more frequent meals, increased hydration  U/S as ordered, to seek care for worsening pain, fever  Orders:  -     US abdomen complete; Future          Subjective:      Patient ID: Renee Leal  is a 37 y o  male  Patient presents to the office accompanied by family for re-evaluation after blood work  During last visit, patient's blood pressure was elevated  Has been monitoring blood pressures at home and notes have been over 130/140 and 80/90  Patient denies symptoms related to high blood pressure  States he does not add salt to diet  States he was diagnosed with HTN in his 19's, was on medication, but stopped it on his own  Patient also notes LUQ dialy x's 4-5 months  Notes pain as cramping, worsens after eating  Notes abdominal bloating  Denies food allergies or h/o GERD  Patient denies fever, chills, n/v   Denies urinary symptoms  The following portions of the patient's history were reviewed and updated as appropriate: allergies, current medications, past family history, past medical history, past social history, past surgical history and problem list     Review of Systems   Constitutional: Negative for chills, fever and unexpected weight change     HENT: Negative for congestion, ear pain and sore throat  Eyes: Negative for photophobia and visual disturbance  Respiratory: Negative for cough, chest tightness and shortness of breath  Cardiovascular: Negative for chest pain and palpitations  Gastrointestinal: Positive for abdominal pain (LUQ)  Negative for blood in stool, nausea and vomiting  Genitourinary: Negative for decreased urine volume, dysuria, flank pain, hematuria and urgency  Musculoskeletal: Negative for arthralgias and myalgias  Skin: Negative for color change and rash  Neurological: Negative for dizziness, weakness, light-headedness and headaches  Psychiatric/Behavioral: Negative for confusion  The patient is not nervous/anxious  Objective:      /100   Pulse 97   Resp 14   Ht 5' 10" (1 778 m)   Wt 80 6 kg (177 lb 9 6 oz)   SpO2 98%   BMI 25 48 kg/m²          Physical Exam  Vitals reviewed  Constitutional:       General: He is not in acute distress  Appearance: Normal appearance  He is not ill-appearing  HENT:      Head: Normocephalic and atraumatic  Right Ear: Tympanic membrane, ear canal and external ear normal       Left Ear: Tympanic membrane, ear canal and external ear normal       Nose: Nose normal       Mouth/Throat:      Mouth: Mucous membranes are moist       Pharynx: Oropharynx is clear  Eyes:      Conjunctiva/sclera: Conjunctivae normal       Pupils: Pupils are equal, round, and reactive to light  Neck:      Vascular: No carotid bruit  Cardiovascular:      Rate and Rhythm: Normal rate and regular rhythm  Pulses: Normal pulses  Heart sounds: Normal heart sounds  Abdominal:      General: Abdomen is flat  Bowel sounds are normal       Palpations: Abdomen is soft  Tenderness: There is abdominal tenderness in the left upper quadrant  There is no right CVA tenderness or left CVA tenderness  Hernia: No hernia is present  Musculoskeletal:         General: Normal range of motion        Cervical back: Normal range of motion  Right lower leg: No edema  Left lower leg: No edema  Skin:     General: Skin is warm and dry  Capillary Refill: Capillary refill takes less than 2 seconds  Neurological:      General: No focal deficit present  Mental Status: He is alert and oriented to person, place, and time     Psychiatric:         Mood and Affect: Mood normal          Behavior: Behavior normal

## 2022-08-11 NOTE — ASSESSMENT & PLAN NOTE
Blood pressure elevated again in office today  Patient has been monitoring blood pressures at home  As per wife, have had readings 130's-140's over 80-90  Patient states he was previously on medication for blood pressure, but states he stopped  Will initiate lisinopril 10 mg  Advised to adhere to low-sodium diet  To continue to monitor blood pressures at home, return in a month, repeat CMP

## 2022-08-11 NOTE — PATIENT INSTRUCTIONS
Heart Healthy Diet   AMBULATORY CARE:   A heart healthy diet  is an eating plan low in unhealthy fats and sodium (salt)  The plan is high in healthy fats and fiber  A heart healthy diet helps improve your cholesterol levels and lowers your risk for heart disease and stroke  A dietitian will teach you how to read and understand food labels  Heart healthy diet guidelines to follow:   Choose foods that contain healthy fats  Unsaturated fats  include monounsaturated and polyunsaturated fats  Unsaturated fat is found in foods such as soybean, canola, olive, corn, and safflower oils  It is also found in soft tub margarine that is made with liquid vegetable oil  Omega-3 fat  is found in certain fish, such as salmon, tuna, and trout, and in walnuts and flaxseed  Eat fish high in omega-3 fats at least 2 times a week  Get 20 to 30 grams of fiber each day  Fruits, vegetables, whole-grain foods, and legumes (cooked beans) are good sources of fiber  Limit or do not have unhealthy fats  Cholesterol  is found in animal foods, such as eggs and lobster, and in dairy products made from whole milk  Limit cholesterol to less than 200 mg each day  Saturated fat  is found in meats, such as schmitz and hamburger  It is also found in chicken or turkey skin, whole milk, and butter  Limit saturated fat to less than 7% of your total daily calories  Trans fat  is found in packaged foods, such as potato chips and cookies  It is also in hard margarine, some fried foods, and shortening  Do not eat foods that contain trans fats  Limit sodium as directed  You may be told to limit sodium to 2,000 to 2,300 mg each day  Choose low-sodium or no-salt-added foods  Add little or no salt to food you prepare  Use herbs and spices in place of salt         Include the following in your heart healthy plan:  Ask your dietitian or healthcare provider how many servings to have from each of the following food groups:  Grains: Whole-wheat breads, cereals, and pastas, and brown rice    Low-fat, low-sodium crackers and chips    Vegetables:      Broccoli, green beans, green peas, and spinach    Collards, kale, and lima beans    Carrots, sweet potatoes, tomatoes, and peppers    Canned vegetables with no salt added    Fruits:      Bananas, peaches, pears, and pineapple    Grapes, raisins, and dates    Oranges, tangerines, grapefruit, orange juice, and grapefruit juice    Apricots, mangoes, melons, and papaya    Raspberries and strawberries    Canned fruit with no added sugar    Low-fat dairy:      Nonfat (skim) milk, 1% milk, and low-fat almond, cashew, or soy milks fortified with calcium    Low-fat cheese, regular or frozen yogurt, and cottage cheese    Meats and proteins:      Lean cuts of beef and pork (loin, leg, round), skinless chicken and turkey    Legumes, soy products, egg whites, or nuts    Limit or do not include the following in your heart healthy plan:   Unhealthy fats and oils:      Whole or 2% milk, cream cheese, sour cream, or cheese    High-fat cuts of beef (T-bone steaks, ribs), chicken or turkey with skin, and organ meats such as liver    Butter, stick margarine, shortening, and cooking oils such as coconut or palm oil    Foods and liquids high in sodium:      Packaged foods, such as frozen dinners, cookies, macaroni and cheese, and cereals with more than 300 mg of sodium per serving    Vegetables with added sodium, such as instant potatoes, vegetables with added sauces, or regular canned vegetables    Cured or smoked meats, such as hot dogs, schmitz, and sausage    High-sodium ketchup, barbecue sauce, salad dressing, pickles, olives, soy sauce, or miso    Foods and liquids high in sugar:      Candy, cake, cookies, pies, or doughnuts    Soft drinks (soda), sports drinks, or sweetened tea    Canned or dry mixes for cakes, soups, sauces, or gravies    Other healthy heart guidelines:   Do not smoke    Nicotine and other chemicals in cigarettes and cigars can cause lung and heart damage  Ask your healthcare provider for information if you currently smoke and need help to quit  E-cigarettes or smokeless tobacco still contain nicotine  Talk to your healthcare provider before you use these products  Limit or do not drink alcohol as directed  Alcohol can damage your heart and raise your blood pressure  Your healthcare provider may give you specific daily and weekly limits  The general recommended limit is 1 drink a day for women 21 or older and for men 72 or older  Do not have more than 3 drinks in a day or 7 in a week  The recommended limit is 2 drinks a day for men 24to 59years of age  Do not have more than 4 drinks in a day or 14 in a week  A drink of alcohol is 12 ounces of beer, 5 ounces of wine, or 1½ ounces of liquor  Exercise regularly  Exercise can help you maintain a healthy weight and improve your blood pressure and cholesterol levels  Regular exercise can also decrease your risk for heart problems  Ask your healthcare provider about the best exercise plan for you  Do not start an exercise program without asking your healthcare provider  Follow up with your doctor or cardiologist as directed:  Write down your questions so you remember to ask them during your visits  © [employer] 2022 Information is for End User's use only and may not be sold, redistributed or otherwise used for commercial purposes  All illustrations and images included in CareNotes® are the copyrighted property of [employer]  or [facility] Tiffanie Rowland   The above information is an  only  It is not intended as medical advice for individual conditions or treatments  Talk to your doctor, nurse or pharmacist before following any medical regimen to see if it is safe and effective for you        Gas and Bloating   AMBULATORY CARE:   What you need to know about gas and bloating:  Gas is air that collects in your digestive system (stomach or intestines)  Bloating is the tight, full feeling you get from too much gas  Common causes of gas and bloating:   Vegetables, such as beans, cabbage, and broccoli    Starches, such as potatoes, corn, wheat, and pasta    Dairy products, or food intolerance (trouble digesting certain foods)    High-fiber cereals and breads, high-fat foods, or carbonated drinks, such as soft drinks    Chewing gum, smoking cigarettes, or loose dentures    A problem such as celiac disease, pancreatic insufficiency, or ascites    A medical condition such as diabetes, gastroparesis, scleroderma, or hypothyroidism    A bowel obstruction or cancer of the stomach, bowel, or ovary    An infection, such as Helicobacter pylori (H  pylori)    Weight gain    Signs and symptoms of gas and bloating:   Abdominal pain    Bloating that is worse during the day and better at night    Burping or passing gas more often than usual    Constipation    Seek care immediately if:   You have severe abdominal pain  You have blood in your bowel movement  Call your doctor if:   You have a fever  You vomit or have diarrhea  You lose weight without trying  You have questions or concerns about your condition or care  Treatment  depends on the cause  Gas relief medicines may help decrease gas pain and bloating  These are available without a doctor's order  You may need other medicines to control a medical condition  Surgery may be needed if you have a tumor or a problem such as a bowel obstruction  Manage or prevent gas and bloating:   Keep a record  Write down what you eat and drink and how often you pass gas each day  Eat and drink slowly  Choose foods that do not cause gas, such as meat, poultry, fish, and eggs  Avoid high-fat foods and vegetables or starches that can cause gas  Do not drink carbonated drinks  Add foods back into your diet one at a time after 1 week  If the food causes symptoms, avoid it      Be physically active  Physical activity, such as exercise, can help relieve gas and constipation  Physical activity can also help you lose weight and keep it off  Your healthcare provider can help you create a physical activity plan  Do not smoke cigarettes or chew gum  These can cause you to swallow air  Make sure your dentures fit properly  Have your dentures fixed if they are loose  Loose dentures can cause you to swallow too much air  Follow up with your doctor as directed:  Write down your questions so you remember to ask them during your visits  © Copyright [employer] 2022 Information is for End User's use only and may not be sold, redistributed or otherwise used for commercial purposes  All illustrations and images included in CareNotes® are the copyrighted property of A D A M , Inc  or [facility] Tiffanie Rowland   The above information is an  only  It is not intended as medical advice for individual conditions or treatments  Talk to your doctor, nurse or pharmacist before following any medical regimen to see if it is safe and effective for you

## 2022-08-16 ENCOUNTER — HOSPITAL ENCOUNTER (OUTPATIENT)
Dept: ULTRASOUND IMAGING | Facility: HOSPITAL | Age: 42
Discharge: HOME | End: 2022-08-16
Payer: COMMERCIAL

## 2022-08-16 DIAGNOSIS — R10.12 LUQ PAIN: ICD-10-CM

## 2022-08-16 PROCEDURE — 76705 ECHO EXAM OF ABDOMEN: CPT

## 2022-08-18 DIAGNOSIS — E78.2 MIXED HYPERLIPIDEMIA: ICD-10-CM

## 2022-08-18 RX ORDER — OMEGA-3-ACID ETHYL ESTERS 1 G/1
2 CAPSULE, LIQUID FILLED ORAL 2 TIMES DAILY
Qty: 120 CAPSULE | Refills: 0 | Status: SHIPPED | OUTPATIENT
Start: 2022-08-18 | End: 2022-09-16 | Stop reason: SDUPTHER

## 2022-08-22 ENCOUNTER — TELEPHONE (OUTPATIENT)
Dept: FAMILY MEDICINE CLINIC | Facility: CLINIC | Age: 42
End: 2022-08-22

## 2022-08-22 NOTE — TELEPHONE ENCOUNTER
Eva Diez called stating Larissa Soares neurologist has passed away and she can't get him an appointment with [facility] neurology to next 2023  She wanting to know if you can refill his meds that the  dr prescribed for his parkinson   He just had them refilled so he is okay for now he is seeing you in Sept

## 2022-08-23 NOTE — TELEPHONE ENCOUNTER
Would you mind verifying which medication(s) and doses he needs refilled? If it can be put through as a medication refill, I will refill it once until he gets into neuro    Thank you

## 2022-08-25 DIAGNOSIS — E78.2 MIXED HYPERLIPIDEMIA: ICD-10-CM

## 2022-08-25 RX ORDER — ROSUVASTATIN CALCIUM 10 MG/1
10 TABLET, COATED ORAL DAILY
Qty: 30 TABLET | Refills: 0 | Status: SHIPPED | OUTPATIENT
Start: 2022-08-25 | End: 2022-09-22 | Stop reason: SDUPTHER

## 2022-08-28 ENCOUNTER — RA CDI HCC (OUTPATIENT)
Dept: OTHER | Facility: HOSPITAL | Age: 42
End: 2022-08-28

## 2022-08-28 NOTE — PROGRESS NOTES
[facility]  coding opportunities       Chart reviewed, no opportunity found:   [address]        Patients Insurance     Medicare Insurance: [employer]

## 2022-09-16 DIAGNOSIS — E78.2 MIXED HYPERLIPIDEMIA: ICD-10-CM

## 2022-09-16 RX ORDER — OMEGA-3-ACID ETHYL ESTERS 1 G/1
2 CAPSULE, LIQUID FILLED ORAL 2 TIMES DAILY
Qty: 120 CAPSULE | Refills: 0 | Status: SHIPPED | OUTPATIENT
Start: 2022-09-16 | End: 2022-10-17 | Stop reason: SDUPTHER

## 2022-09-21 ENCOUNTER — APPOINTMENT (OUTPATIENT)
Dept: LAB | Facility: CLINIC | Age: 42
End: 2022-09-21
Payer: COMMERCIAL

## 2022-09-21 DIAGNOSIS — I10 PRIMARY HYPERTENSION: ICD-10-CM

## 2022-09-21 LAB
ALBUMIN SERPL BCP-MCNC: 4 G/DL (ref 3.5–5)
ALP SERPL-CCNC: 70 U/L (ref 46–116)
ALT SERPL W P-5'-P-CCNC: 27 U/L (ref 12–78)
ANION GAP SERPL CALCULATED.3IONS-SCNC: 2 MMOL/L (ref 4–13)
AST SERPL W P-5'-P-CCNC: 16 U/L (ref 5–45)
BILIRUB SERPL-MCNC: 0.63 MG/DL (ref 0.2–1)
BUN SERPL-MCNC: 20 MG/DL (ref 5–25)
CALCIUM SERPL-MCNC: 9.6 MG/DL (ref 8.3–10.1)
CHLORIDE SERPL-SCNC: 107 MMOL/L (ref 96–108)
CO2 SERPL-SCNC: 30 MMOL/L (ref 21–32)
CREAT SERPL-MCNC: 1.2 MG/DL (ref 0.6–1.3)
GFR SERPL CREATININE-BSD FRML MDRD: 75 ML/MIN/1.73SQ M
GLUCOSE P FAST SERPL-MCNC: 101 MG/DL (ref 65–99)
POTASSIUM SERPL-SCNC: 4.6 MMOL/L (ref 3.5–5.3)
PROT SERPL-MCNC: 7.8 G/DL (ref 6.4–8.4)
SODIUM SERPL-SCNC: 139 MMOL/L (ref 135–147)

## 2022-09-21 PROCEDURE — 80053 COMPREHEN METABOLIC PANEL: CPT

## 2022-09-21 PROCEDURE — 36415 COLL VENOUS BLD VENIPUNCTURE: CPT

## 2022-09-22 ENCOUNTER — OFFICE VISIT (OUTPATIENT)
Dept: FAMILY MEDICINE CLINIC | Facility: CLINIC | Age: 42
End: 2022-09-22
Payer: COMMERCIAL

## 2022-09-22 VITALS
TEMPERATURE: 98.1 F | OXYGEN SATURATION: 100 % | SYSTOLIC BLOOD PRESSURE: 142 MMHG | DIASTOLIC BLOOD PRESSURE: 88 MMHG | HEART RATE: 77 BPM | HEIGHT: 70 IN | WEIGHT: 175.2 LBS | BODY MASS INDEX: 25.08 KG/M2

## 2022-09-22 DIAGNOSIS — M25.551 HIP PAIN, BILATERAL: Primary | ICD-10-CM

## 2022-09-22 DIAGNOSIS — I10 PRIMARY HYPERTENSION: ICD-10-CM

## 2022-09-22 DIAGNOSIS — E78.2 MIXED HYPERLIPIDEMIA: ICD-10-CM

## 2022-09-22 DIAGNOSIS — M62.838 MUSCLE SPASMS OF BOTH LOWER EXTREMITIES: ICD-10-CM

## 2022-09-22 DIAGNOSIS — M25.552 HIP PAIN, BILATERAL: Primary | ICD-10-CM

## 2022-09-22 PROCEDURE — 3077F SYST BP >= 140 MM HG: CPT | Performed by: NURSE PRACTITIONER

## 2022-09-22 PROCEDURE — 3079F DIAST BP 80-89 MM HG: CPT | Performed by: NURSE PRACTITIONER

## 2022-09-22 PROCEDURE — 99214 OFFICE O/P EST MOD 30 MIN: CPT | Performed by: NURSE PRACTITIONER

## 2022-09-22 RX ORDER — BACLOFEN 10 MG/1
10 TABLET ORAL 3 TIMES DAILY
Qty: 90 TABLET | Refills: 0 | Status: SHIPPED | OUTPATIENT
Start: 2022-09-22

## 2022-09-22 RX ORDER — ROSUVASTATIN CALCIUM 10 MG/1
10 TABLET, COATED ORAL DAILY
Qty: 30 TABLET | Refills: 0 | Status: SHIPPED | OUTPATIENT
Start: 2022-09-22 | End: 2022-10-27 | Stop reason: SDUPTHER

## 2022-09-22 RX ORDER — LISINOPRIL 20 MG/1
20 TABLET ORAL DAILY
Qty: 90 TABLET | Refills: 0 | Status: SHIPPED | OUTPATIENT
Start: 2022-09-22

## 2022-09-22 NOTE — PROGRESS NOTES
Name: Kareen Carrasco  : 1980      MRN: [id number]  Encounter Provider: LESLIE Rondon  Encounter Date: 2022   Encounter department: [address]     1  Hip pain, bilateral  Comments:  Advised heat therapy, ROM exercises  Will obtain x-rays to r/o arthritis  Orders:  -     XR hip/pelv 2-3 vws left if performed; Future; Expected date: 2022  -     XR hip/pelv 2-3 vws right if performed; Future; Expected date: 2022    2  Muscle spasms of both lower extremities  Comments:  Advised adequate hydration, massaging area  Discussed initiating Magnesium OTC, Baclofen prn for spasms  Orders:  -     baclofen 10 mg tablet; Take 1 tablet (10 mg total) by mouth 3 (three) times a day    3  Primary hypertension  Assessment & Plan:  Lisinopril 10 mg initiated at last visit  Blood pressure not at goal today  Patient has not been monitoring BPs at home  Denies symptoms related to high blood pressure  Discussed heart-healthy diet, sodium reduction, adequate hydration  Will increase lisinopril to 20 mg  Advised to monitor daily blood pressures, will f/u in 4 weeks or sooner if needed  Orders:  -     lisinopril (ZESTRIL) 20 mg tablet; Take 1 tablet (20 mg total) by mouth daily         Subjective      Patient presents to the office for 1 month follow-up  Lisinopril initiated during last visit when patient reported continued elevated blood pressures  As per patient, has been compliant with medication, taking daily as prescribed  Patient denies adverse reactions to medication  Has not been monitoring blood pressure at home, denies symptoms related to high blood pressure  Patient reports bilateral hip pain and leg pain x's 1 month  Denies recent injury or fall  Patient notes hip pain radiates to anterior thighs and then begins to experience "tightness" and "spasms" in thighs  Right leg worse than the right    Pain is worse at night  Denies difficulty ambulating  Denies inability to bear weight  Denies numbness in legs  Denies urinary symptoms or abdominal pain  Has been using [employer], Advil, and Voltaren at home with no relief  Review of Systems   Constitutional: Negative for chills, fatigue and fever  Eyes: Negative for photophobia and visual disturbance  Respiratory: Negative for choking and shortness of breath  Cardiovascular: Negative for chest pain, palpitations and leg swelling  Gastrointestinal: Negative for abdominal pain, nausea and vomiting  Genitourinary: Negative for decreased urine volume, flank pain, hematuria and urgency  Musculoskeletal: Positive for arthralgias (b/l hips) and myalgias  Negative for back pain and gait problem  Skin: Negative for color change and rash  Neurological: Negative for dizziness, weakness, light-headedness, numbness and headaches  Psychiatric/Behavioral: Negative for confusion  Current Outpatient Medications on File Prior to Visit   Medication Sig    buPROPion [facility]) 75 mg tablet Take 75 mg by mouth 2 (two) times a day    carbidopa-levodopa (SINEMET)  mg per tablet Take 1 tablet by mouth 4 (four) times a day    entacapone (COMTAN) 200 mg tablet take 1 tablet by mouth five times a day    hydrOXYzine HCL (ATARAX) 50 mg tablet Take 50 mg by mouth every evening    omega-3-acid ethyl esters (LOVAZA) 1 g capsule Take 2 capsules (2 g total) by mouth 2 (two) times a day    rosuvastatin (CRESTOR) 10 MG tablet Take 1 tablet (10 mg total) by mouth daily    [DISCONTINUED] lisinopril (ZESTRIL) 10 mg tablet Take 1 tablet (10 mg total) by mouth daily       Objective     /88   Pulse 77   Temp 98 1 °F (36 7 °C)   Ht 5' 10" (1 778 m)   Wt 79 5 kg (175 lb 3 2 oz)   SpO2 100%   BMI 25 14 kg/m²     Physical Exam  Vitals reviewed  Constitutional:       General: He is not in acute distress  Appearance: Normal appearance   He is not ill-appearing  HENT:      Head: Normocephalic and atraumatic  Right Ear: Tympanic membrane, ear canal and external ear normal       Left Ear: Tympanic membrane, ear canal and external ear normal       Mouth/Throat:      Mouth: Mucous membranes are moist       Pharynx: Oropharynx is clear  Eyes:      Conjunctiva/sclera: Conjunctivae normal       Pupils: Pupils are equal, round, and reactive to light  Neck:      Vascular: No carotid bruit  Cardiovascular:      Rate and Rhythm: Normal rate and regular rhythm  Pulses: Normal pulses  Heart sounds: Normal heart sounds  Pulmonary:      Effort: Pulmonary effort is normal       Breath sounds: Normal breath sounds  Abdominal:      General: Bowel sounds are normal       Tenderness: There is no abdominal tenderness  There is no right CVA tenderness or left CVA tenderness  Musculoskeletal:         General: Normal range of motion  Cervical back: Normal range of motion and neck supple  No tenderness  Right hip: No tenderness, bony tenderness or crepitus  Normal range of motion  Left hip: No tenderness, bony tenderness or crepitus  Normal range of motion  Right upper leg: Tenderness (anterior thigh) present  No swelling or edema  Left upper leg: Normal       Right lower leg: No edema  Left lower leg: No edema  Skin:     General: Skin is warm  Capillary Refill: Capillary refill takes less than 2 seconds  Neurological:      General: No focal deficit present  Mental Status: He is alert and oriented to person, place, and time     Psychiatric:         Mood and Affect: Mood normal          Behavior: Behavior normal        LESLIE Trujillo

## 2022-09-22 NOTE — ASSESSMENT & PLAN NOTE
Lisinopril 10 mg initiated at last visit  Blood pressure not at goal today  Patient has not been monitoring BPs at home  Denies symptoms related to high blood pressure  Discussed heart-healthy diet, sodium reduction, adequate hydration  Will increase lisinopril to 20 mg  Advised to monitor daily blood pressures, will f/u in 4 weeks or sooner if needed

## 2022-09-22 NOTE — PATIENT INSTRUCTIONS
Can use Magnesium oxide 400 mg daily over-the-counter  Baclofen for muscle spasms as needed  Hip Bursitis Exercises   AMBULATORY CARE:   Hip bursitis exercises  help strengthen the muscles in your hip and keep the joint flexible  Strong muscles can help reduce pain, prevent injury, and keep the joint stable  The exercises can also help increase the range of motion in your hip joint  What you need to know about exercise safety:   Move slowly and smoothly  Avoid fast or jerky motions  This will help prevent an injury  Breathe normally  Do not hold your breath  It is important to breathe in and out so you do not tense up during exercise  Tension could prevent you from moving your joint in a full range of motion  Do the exercises and stretches on both legs  Do this so both hips remain strong and flexible  Stop if you feel sharp pain or an increase in pain  Contact your healthcare provider or physical therapist  It is normal to feel some discomfort during exercise  Regular exercise will help decrease your discomfort over time  Warm up before you stretch and exercise  Walk or ride a stationary bike for 5 to 10 minutes  How to do hip stretches: Your healthcare provider or physical therapist will tell you how many times to do each stretch  Do the stretch on both sides before you move to the next stretch  Standing iliotibial band stretch:  Stand with the leg on your injured side behind your other leg  Bend sideways toward the side that is not injured  Stop when you feel a stretch in your outer hip  Hold for 5 to 10 seconds  Then return to the starting position  Lying iliotibial band stretch:  Lie on your back  Bend the knee on your injured side toward your chest  Place your hands on the outside of your knee and thigh  Slowly pull the knee across your body  Stop when you feel a stretch in your hip and outer thigh  Hold for 5 to 10 seconds  Return your leg to the starting position  Hip stretch:  Lie on your back with both legs straight and on the ground  Bend the knee on your injured side toward your chest until you can reach your lower leg  Place both hands on your shin and pull your knee toward your chest  Hold for 5 to 10 seconds  Return your leg to the starting position  Knee to chest:  Lie on your back with both knees bent and feet flat on the floor  Bend the knee on your injured side toward your chest until you can reach your lower leg  Place both hands on your shin and pull your knee toward your chest  Hold for 5 to 10 seconds  Return your leg to the starting position  Internal hip rotator stretch:  You will do this exercise on a table  Lie on your side with the injured hip on top  You may be told to keep a pillow between your thighs  Move the top leg so the foot hangs below the edge of the table  Rotate your hip to raise your foot in the opposite direction of the bottom shoulder  Raise your foot as high as you can so you feel a stretch in the back of your thigh  Hold for 5 seconds  Then slowly lower your foot to the starting position  External hip rotator stretch:  You will do this exercise on a table  Lie on your side with the injured hip on the bottom  You do not need a pillow between your thighs for this exercise  Move the bottom leg so the foot is off the edge of the table  Rotate your hip to lift the foot in the opposite direction of the bottom shoulder  Raise your foot as high as you can so you feel a stretch in your buttock  Hold for 5 seconds  Then slowly lower your foot to the starting position  Kneeling hip flexor stretch:  Kneel on your knee on the injured side  Place the foot of your other leg on the floor so the knee is bent  Put both hands on top of your thigh  Keep your back straight and abdominal muscles tight  Lean forward until you feel a stretch in your other thigh  Hold the stretch for 10 seconds  Return to the starting position  How to do hip strengthening exercises: Your healthcare provider or physical therapist will tell you how many times to do each exercise  Do the exercise on both sides before you move to the next exercise  Straight leg lift to the side: This may also be called hip abduction  Lie on your side with straight legs, with the injured hip on top  Slowly raise your top leg toward the ceiling as high as you can  Keep your foot pointed  Hold for 5 seconds  Then slowly lower your leg to the starting position  Inner thigh lift: This may also be called hip adduction  Lie on your side with straight legs, with the injured hip on the bottom  Cross your top leg over your bottom leg  Put the foot of your top leg on the floor in front of you  Raise your bottom leg until it touches the top leg  Hold for 5 seconds  Then slowly lower the leg to the floor  Clam exercise:  Lie on your side so your injured side is on top  Bend your knees  Keep your heels together during this exercise  Slowly raise your top knee toward the ceiling  Then lower your leg so your knees are together  Call your doctor if:   You have sharp pain during exercise or at rest     You have questions or concerns about the stretches or exercises  © Copyright [employer] 2022 Information is for End User's use only and may not be sold, redistributed or otherwise used for commercial purposes  All illustrations and images included in CareNotes® are the copyrighted property of [employer]  or Shama Murcia  The above information is an  only  It is not intended as medical advice for individual conditions or treatments  Talk to your doctor, nurse or pharmacist before following any medical regimen to see if it is safe and effective for you

## 2022-10-11 ENCOUNTER — APPOINTMENT (OUTPATIENT)
Dept: RADIOLOGY | Facility: CLINIC | Age: 42
End: 2022-10-11
Payer: COMMERCIAL

## 2022-10-11 DIAGNOSIS — M25.552 HIP PAIN, BILATERAL: ICD-10-CM

## 2022-10-11 DIAGNOSIS — M25.551 HIP PAIN, BILATERAL: ICD-10-CM

## 2022-10-11 PROCEDURE — 73502 X-RAY EXAM HIP UNI 2-3 VIEWS: CPT

## 2022-10-14 ENCOUNTER — TELEPHONE (OUTPATIENT)
Dept: FAMILY MEDICINE CLINIC | Facility: CLINIC | Age: 42
End: 2022-10-14

## 2022-10-14 NOTE — TELEPHONE ENCOUNTER
Called pt and left message to give us a call back to go over x-ray results  I left him a MyChart message as well

## 2022-10-14 NOTE — TELEPHONE ENCOUNTER
----- Message from Maine Leon, [address] sent at 10/14/2022 12:52 PM EDT -----  Please inform patient x-rays of hips do not show any acute fractures or lesions  Right hip is normal and left hip shows mild osteoarthritis  Can be treated with NSAIDS, heat, and stretching

## 2022-10-17 DIAGNOSIS — E78.2 MIXED HYPERLIPIDEMIA: ICD-10-CM

## 2022-10-18 RX ORDER — OMEGA-3-ACID ETHYL ESTERS 1 G/1
2 CAPSULE, LIQUID FILLED ORAL 2 TIMES DAILY
Qty: 120 CAPSULE | Refills: 0 | Status: SHIPPED | OUTPATIENT
Start: 2022-10-18

## 2022-10-27 DIAGNOSIS — E78.2 MIXED HYPERLIPIDEMIA: ICD-10-CM

## 2022-10-27 RX ORDER — ROSUVASTATIN CALCIUM 10 MG/1
10 TABLET, COATED ORAL DAILY
Qty: 30 TABLET | Refills: 0 | Status: SHIPPED | OUTPATIENT
Start: 2022-10-27 | End: 2022-11-26

## 2022-11-03 DIAGNOSIS — F32.0 CURRENT MILD EPISODE OF MAJOR DEPRESSIVE DISORDER WITHOUT PRIOR EPISODE (HCC): Primary | ICD-10-CM

## 2022-11-03 RX ORDER — HYDROXYZINE 50 MG/1
50 TABLET, FILM COATED ORAL EVERY EVENING
Qty: 30 TABLET | Refills: 1 | Status: SHIPPED | OUTPATIENT
Start: 2022-11-03

## 2022-11-03 RX ORDER — BUPROPION HYDROCHLORIDE 75 MG/1
75 TABLET ORAL 2 TIMES DAILY
Qty: 60 TABLET | Refills: 1 | Status: SHIPPED | OUTPATIENT
Start: 2022-11-03

## 2022-11-03 NOTE — TELEPHONE ENCOUNTER
His neurologist passed away  He is scheduled with a new one next September is the first they could get him in  He is on a waiting list to move up  Can you fill his meds for him?

## 2022-11-14 DIAGNOSIS — E78.2 MIXED HYPERLIPIDEMIA: ICD-10-CM

## 2022-11-14 RX ORDER — OMEGA-3-ACID ETHYL ESTERS 1 G/1
2 CAPSULE, LIQUID FILLED ORAL 2 TIMES DAILY
Qty: 120 CAPSULE | Refills: 0 | Status: SHIPPED | OUTPATIENT
Start: 2022-11-14

## 2022-11-15 ENCOUNTER — RA CDI HCC (OUTPATIENT)
Dept: OTHER | Facility: HOSPITAL | Age: 42
End: 2022-11-15

## 2022-11-15 NOTE — PROGRESS NOTES
[facility]  coding opportunities       Chart reviewed, no opportunity found:   [address]        Patients Insurance     Medicare Insurance: [employer]

## 2022-11-21 DIAGNOSIS — E78.2 MIXED HYPERLIPIDEMIA: ICD-10-CM

## 2022-11-21 RX ORDER — ROSUVASTATIN CALCIUM 10 MG/1
10 TABLET, COATED ORAL DAILY
Qty: 30 TABLET | Refills: 0 | Status: SHIPPED | OUTPATIENT
Start: 2022-11-21 | End: 2022-12-21

## 2022-11-22 ENCOUNTER — OFFICE VISIT (OUTPATIENT)
Dept: FAMILY MEDICINE CLINIC | Facility: CLINIC | Age: 42
End: 2022-11-22

## 2022-11-22 VITALS
HEART RATE: 85 BPM | BODY MASS INDEX: 25.34 KG/M2 | SYSTOLIC BLOOD PRESSURE: 120 MMHG | OXYGEN SATURATION: 98 % | HEIGHT: 70 IN | DIASTOLIC BLOOD PRESSURE: 82 MMHG | WEIGHT: 177 LBS

## 2022-11-22 DIAGNOSIS — I10 PRIMARY HYPERTENSION: Primary | ICD-10-CM

## 2022-11-22 DIAGNOSIS — M16.12 OSTEOARTHRITIS OF LEFT HIP, UNSPECIFIED OSTEOARTHRITIS TYPE: ICD-10-CM

## 2022-11-22 RX ORDER — MELOXICAM 15 MG/1
15 TABLET ORAL DAILY
Qty: 30 TABLET | Refills: 1 | Status: SHIPPED | OUTPATIENT
Start: 2022-11-22

## 2022-11-22 NOTE — ASSESSMENT & PLAN NOTE
Blood pressure managing office today  Patient notes compliance with lisinopril 20 mg daily  Encouraged heart healthy diet, physical activity, weight management  To continue on current dose

## 2022-11-22 NOTE — ASSESSMENT & PLAN NOTE
Recent imaging reviewed with patient  Discussed ROM exercises, walking, maintaining healthy weight  Discussed use of tart cherry, turmeric, and glucosamine chondroitin    To use meloxicam p r n  discussed if conservative measures fail, will need referral to PT

## 2022-11-22 NOTE — PROGRESS NOTES
Name: Adan Baumann  : 1980      MRN: [id number]  Encounter Provider: LESLIE Restrepo  Encounter Date: 2022   Encounter department: [facility] [place] [address]  Primary hypertension  Assessment & Plan:  Blood pressure managing office today  Patient notes compliance with lisinopril 20 mg daily  Encouraged heart healthy diet, physical activity, weight management  To continue on current dose  2  Osteoarthritis of left hip, unspecified osteoarthritis type  Assessment & Plan:  Recent imaging reviewed with patient  Discussed ROM exercises, walking, maintaining healthy weight  Discussed use of tart cherry, turmeric, and glucosamine chondroitin  To use meloxicam p r n  discussed if conservative measures fail, will need referral to PT  Orders:  -     meloxicam (Mobic) 15 mg tablet; Take 1 tablet (15 mg total) by mouth daily         Subjective      Patient sent office for for which checkup  Lisinopril was increased from 10 mg to 20 mg during last visit  Patient notes compliance with medication  Does not take blood pressures at home  Denies symptoms related to elevated blood pressures  Patient denies adverse effects from increase in medication  Patient denies chest pain, palpitations, shortness of breath, headaches, dizziness, visual disturbances  Review of Systems   Constitutional: Negative for chills, diaphoresis and fever  HENT: Negative for congestion, sore throat and trouble swallowing  Eyes: Negative for photophobia and visual disturbance  Respiratory: Negative for cough and shortness of breath  Cardiovascular: Negative for chest pain, palpitations and leg swelling  Gastrointestinal: Negative for abdominal pain, nausea and vomiting  Genitourinary: Negative for decreased urine volume, dysuria, frequency, hematuria and urgency  Musculoskeletal: Positive for arthralgias (left hip)   Negative for back pain, gait problem, joint swelling and myalgias  Neurological: Negative for dizziness, weakness, light-headedness, numbness and headaches  Psychiatric/Behavioral: Negative for confusion  Current Outpatient Medications on File Prior to Visit   Medication Sig   • baclofen 10 mg tablet Take 1 tablet (10 mg total) by mouth 3 (three) times a day   • buPROPion (WELLBUTRIN) 75 mg tablet Take 1 tablet (75 mg total) by mouth 2 (two) times a day   • carbidopa-levodopa (SINEMET)  mg per tablet Take 1 tablet by mouth 4 (four) times a day   • entacapone (COMTAN) 200 mg tablet take 1 tablet by mouth five times a day   • hydrOXYzine HCL (ATARAX) 50 mg tablet Take 1 tablet (50 mg total) by mouth every evening   • lisinopril (ZESTRIL) 20 mg tablet Take 1 tablet (20 mg total) by mouth daily   • omega-3-acid ethyl esters (LOVAZA) 1 g capsule Take 2 capsules (2 g total) by mouth 2 (two) times a day   • rosuvastatin (CRESTOR) 10 MG tablet Take 1 tablet (10 mg total) by mouth daily       Objective     /82   Pulse 85   Ht 5' 10" (1 778 m)   Wt 80 3 kg (177 lb)   SpO2 98%   BMI 25 40 kg/m²     Physical Exam  Vitals reviewed  Constitutional:       General: He is not in acute distress  Appearance: Normal appearance  He is not ill-appearing  HENT:      Head: Normocephalic and atraumatic  Right Ear: Tympanic membrane, ear canal and external ear normal       Left Ear: Tympanic membrane, ear canal and external ear normal       Nose: Nose normal       Mouth/Throat:      Mouth: Mucous membranes are moist       Pharynx: Oropharynx is clear  Eyes:      Conjunctiva/sclera: Conjunctivae normal       Pupils: Pupils are equal, round, and reactive to light  Cardiovascular:      Rate and Rhythm: Normal rate and regular rhythm  Pulses: Normal pulses  Heart sounds: Normal heart sounds  No murmur heard  Pulmonary:      Effort: Pulmonary effort is normal       Breath sounds: Normal breath sounds  Abdominal:      General: Bowel sounds are normal       Palpations: Abdomen is soft  Tenderness: There is no abdominal tenderness  Musculoskeletal:         General: Normal range of motion  Cervical back: Normal range of motion and neck supple  Right hip: Normal       Left hip: No tenderness, bony tenderness or crepitus  Normal range of motion  Normal strength  Right lower leg: No edema  Left lower leg: No edema  Skin:     General: Skin is warm and dry  Capillary Refill: Capillary refill takes less than 2 seconds  Neurological:      General: No focal deficit present  Mental Status: He is alert and oriented to person, place, and time     Psychiatric:         Mood and Affect: Mood normal          Behavior: Behavior normal        LESLIE Aguilar

## 2022-11-22 NOTE — PATIENT INSTRUCTIONS
Can start taking:  Tumeric 500 mg twice daily  Tart cherry, at least 1000 mg daily  Glucosamine-chondroitin 2-3 tablets/day      Osteoarthritis   AMBULATORY CARE:   Osteoarthritis  occurs when cartilage (tissue that cushions a joint) wears away slowly and causes the bones to rub together  Osteoarthritis (OA) is a long-term condition that often affects the hands, neck, lower back, knees, and hips  OA is also called arthrosis or degenerative joint disease  Common signs and symptoms include the following:   Joint pain that gets worse when you move the joint     Joint stiffness that decreases after you move the joint     Decreased range of movement     Hard, bony enlargement on your fingers or toes    A grinding or cracking sound when you move your joint    Call your doctor or specialist if:   You have severe pain  You cannot move your joint  You have a fever  Your joint is red and tender  You have questions or concerns about your condition or care  Treatment for osteoarthritis  may include any of the following:  Acetaminophen  decreases pain and fever  It is available without a doctor's order  Ask how much to take and how often to take it  Follow directions  Read the labels of all other medicines you are using to see if they also contain acetaminophen, or ask your doctor or pharmacist  Acetaminophen can cause liver damage if not taken correctly  Do not use more than 4 grams (4,000 milligrams) total of acetaminophen in one day  NSAIDs , such as ibuprofen, help decrease swelling, pain, and fever  This medicine is available with or without a doctor's order  NSAIDs can cause stomach bleeding or kidney problems in certain people  If you take blood thinner medicine, always ask your healthcare provider if NSAIDs are safe for you  Always read the medicine label and follow directions  Capsaicin cream  may help decrease pain in your joint  Prescription pain medicine  may be given   Ask your healthcare provider how to take this medicine safely  Some prescription pain medicines contain acetaminophen  Do not take other medicines that contain acetaminophen without talking to your healthcare provider  Too much acetaminophen may cause liver damage  Prescription pain medicine may cause constipation  Ask your healthcare provider how to prevent or treat constipation  A steroid injection  may be given if your symptoms get worse  Physical therapy  is used to teach you exercises to help improve movement and strength, and to decrease pain  A physical therapist may move an area with his or her hands  For example, he or she may move your leg in certain ways to treat osteoarthritis in your hip  Ultrasound  may be used to treat osteoarthritis in certain areas, such as your knee  Ultrasound produces heat that can relieve pain  Surgery  may be needed if other treatments do not work  Manage your symptoms:   Stay active  Physical activity may reduce your pain and improve your ability to do daily activities  Avoid activities that cause pain  Ask your healthcare provider what type of exercise would be best for you  Maintain a healthy weight  This helps decrease the strain on the joints in your back, hips, knees, ankles, and feet  Ask your healthcare provider what a healthy weight is for you  He or she can help you create a weight loss plan if you are overweight  Use heat or ice on your joints as directed  Heat and ice help decrease pain, swelling, and muscle spasms  For heat, use a heating pad on a low setting for 20 minutes, or take a warm bath  For ice, use an ice pack, or put crushed ice in a plastic bag  Cover it with a towel before you place it on your joint  Use ice for 15 minutes every hour  Massage the muscles around the joint  Massage helps relieve pain and stiffness  Your healthcare provider or a physical therapist can show you how to do this   If you have hip OA, another person may need to help you massage the area  Use a cane, crutches, or a walker if directed  These help protect and relieve pressure on your ankle, knee, and hip joints  You may also be prescribed shoe inserts to decrease pressure in your joints  Wear flat or low-heeled shoes  This will help decrease pain and reduce pressure on your ankle, knee, and hip joints  Follow up with your doctor as directed:  Write down your questions so you remember to ask them during your visits  © Copyright [employer] 2022 Information is for End User's use only and may not be sold, redistributed or otherwise used for commercial purposes  All illustrations and images included in CareNotes® are the copyrighted property of A D A M , Inc  or [facility] Tiffanie Rowland   The above information is an  only  It is not intended as medical advice for individual conditions or treatments  Talk to your doctor, nurse or pharmacist before following any medical regimen to see if it is safe and effective for you

## 2022-12-16 DIAGNOSIS — E78.2 MIXED HYPERLIPIDEMIA: ICD-10-CM

## 2022-12-16 DIAGNOSIS — I10 PRIMARY HYPERTENSION: ICD-10-CM

## 2022-12-16 RX ORDER — ROSUVASTATIN CALCIUM 10 MG/1
10 TABLET, COATED ORAL DAILY
Qty: 30 TABLET | Refills: 0 | Status: SHIPPED | OUTPATIENT
Start: 2022-12-16 | End: 2023-01-15

## 2022-12-16 RX ORDER — LISINOPRIL 20 MG/1
20 TABLET ORAL DAILY
Qty: 90 TABLET | Refills: 0 | Status: SHIPPED | OUTPATIENT
Start: 2022-12-16

## 2022-12-16 RX ORDER — OMEGA-3-ACID ETHYL ESTERS 1 G/1
2 CAPSULE, LIQUID FILLED ORAL 2 TIMES DAILY
Qty: 120 CAPSULE | Refills: 0 | Status: SHIPPED | OUTPATIENT
Start: 2022-12-16

## 2023-01-16 DIAGNOSIS — E78.2 MIXED HYPERLIPIDEMIA: ICD-10-CM

## 2023-01-17 RX ORDER — ROSUVASTATIN CALCIUM 10 MG/1
10 TABLET, COATED ORAL DAILY
Qty: 30 TABLET | Refills: 0 | Status: SHIPPED | OUTPATIENT
Start: 2023-01-17 | End: 2023-01-19 | Stop reason: SDUPTHER

## 2023-01-17 RX ORDER — OMEGA-3-ACID ETHYL ESTERS 1 G/1
2 CAPSULE, LIQUID FILLED ORAL 2 TIMES DAILY
Qty: 120 CAPSULE | Refills: 0 | Status: SHIPPED | OUTPATIENT
Start: 2023-01-17 | End: 2023-01-19 | Stop reason: SDUPTHER

## 2023-03-14 DIAGNOSIS — I10 PRIMARY HYPERTENSION: ICD-10-CM

## 2023-03-14 RX ORDER — LISINOPRIL 20 MG/1
20 TABLET ORAL DAILY
Qty: 90 TABLET | Refills: 0 | OUTPATIENT
Start: 2023-03-14

## 2023-03-14 RX ORDER — LISINOPRIL 20 MG/1
20 TABLET ORAL DAILY
Qty: 90 TABLET | Refills: 0 | Status: SHIPPED | OUTPATIENT
Start: 2023-03-14

## 2023-03-16 ENCOUNTER — OFFICE VISIT (OUTPATIENT)
Dept: FAMILY MEDICINE CLINIC | Facility: CLINIC | Age: 43
End: 2023-03-16

## 2023-03-16 VITALS
OXYGEN SATURATION: 99 % | WEIGHT: 177.8 LBS | HEART RATE: 101 BPM | SYSTOLIC BLOOD PRESSURE: 138 MMHG | BODY MASS INDEX: 25.45 KG/M2 | HEIGHT: 70 IN | TEMPERATURE: 97.7 F | DIASTOLIC BLOOD PRESSURE: 86 MMHG

## 2023-03-16 DIAGNOSIS — K92.1 BLOOD IN STOOL: ICD-10-CM

## 2023-03-16 DIAGNOSIS — R23.3 PETECHIAL RASH: ICD-10-CM

## 2023-03-16 DIAGNOSIS — F32.0 CURRENT MILD EPISODE OF MAJOR DEPRESSIVE DISORDER WITHOUT PRIOR EPISODE (HCC): ICD-10-CM

## 2023-03-16 DIAGNOSIS — R42 DIZZINESS: Primary | ICD-10-CM

## 2023-03-16 DIAGNOSIS — R53.82 CHRONIC FATIGUE: ICD-10-CM

## 2023-03-16 DIAGNOSIS — G20 PARKINSON'S DISEASE (HCC): ICD-10-CM

## 2023-03-16 DIAGNOSIS — R10.12 LEFT UPPER QUADRANT ABDOMINAL PAIN: ICD-10-CM

## 2023-03-16 NOTE — PROGRESS NOTES
Name: Rebeka Negrete  : 1980      MRN: [id number]  Encounter Provider: LESLIE Melvin  Encounter Date: 3/16/2023   Encounter department: [facility] [address] [place] [address]  Dizziness  Comments:  ECG/orthostatics WNL in office today, declining Holter monitor  Advised to change positions slowly, increase hydration  to return for worsening symptoms  Orders:  -     POCT ECG    2  Chronic fatigue  Assessment & Plan:  Patient does note chronic fatigue from Parkinson's, but states recent fatigue is different in nature  Discussed with patient self-care, adequate sleep, hydration, nutrition  Will obtain labs  Return in 4 weeks for reevaluation  Orders:  -     CBC and differential; Future  -     Iron Panel (Includes Ferritin, Iron Sat%, Iron, and TIBC); Future  -     TSH, 3rd generation with Free T4 reflex; Future  -     Vitamin D 25 hydroxy; Future  -     ANRDEA Screen w/ Reflex to Titer/Pattern; Future  -     Lyme Antibody Profile with reflex to WB; Future  -     RF Screen w/ Reflex to Titer; Future  -     C-reactive protein; Future  -     Sjogren's Antibodies; Future  -     Comprehensive metabolic panel; Future    3  Blood in stool  Comments: To obtain labs/ct scan as ordered  To monitor for food triggers, avoid constipation and straining while having bowel movements  Orders:  -     CBC and differential; Future  -     CT abdomen w contrast; Future; Expected date: 2023  -     Comprehensive metabolic panel; Future  -     Celiac Disease Antibody Profile; Future    4  Left upper quadrant abdominal pain  -     CBC and differential; Future  -     C-reactive protein; Future  -     CT abdomen w contrast; Future; Expected date: 2023  -     Comprehensive metabolic panel; Future  -     Celiac Disease Antibody Profile; Future    5  Petechial rash  -     CBC and differential; Future  -     Protime-INR; Future    6   Parkinson's disease [facility])  Assessment & Plan:  Patient with dizziness, fatigue, joint pain, increasing tremors  Patient does have an appointment with Parkinson's specialist, but not until September  Patient is also on wait list for earlier appointment  Advised to try to reach out to neurology office to see if a sooner appointment can be made due to increase of symptoms  7  Current mild episode of major depressive disorder without prior episode [facility])  Assessment & Plan:  Stable on current medications  Denies exacerbation of symptoms  Patient denies symptoms related to depression at this time  Subjective      Patient presents to the office for evaluation of dizziness  Patient states a month ago, developed gastroenteritis, took over a week to get better, and has dizziness as well as other symptoms since  Patient notes dizziness, fatigue,, abdominal pain, and episode of blood in his stool, and appearance of "red dots" on his skin  Has been experiencing dizziness recently with Parkinson's, but now notes it has been increasing in frequency  Feels like the "room spinning "  Patient states episodes have been waxing and waning over the past month  States he had about 6-12 episodes of the dizziness  Patient states dizziness self resolves after a few minutes  Patient denies headaches, visual disturbances, syncope, chest pain, palpitations, shortness of breath  A week ago had an episode of blood on toilet paper while using the bathroom  Patient denies recent constipation, straining to move bowels, or hemorrhoids  Patient had been experiencing intermittent episodes of abdominal pain and distention  Patient notes pain is worse in the left upper quadrant area  Patient denies nausea or vomiting, fever, chills  Patient also notes rash  Patient states he is always had " red spots" on his skin, but has recently seen more areas on his skin with this rash has developed    Patient also notes larger area of rash to left shin   Patient states that it was "purple", but has since gone back to normal color and now just has dry skin to area  Patient denies pain or pruritus with rash  Patient denies hematuria, epistaxis, hemoptysis, fever, chills, unexplained weight loss, syncope  Review of Systems   Constitutional: Positive for fatigue  Negative for unexpected weight change  HENT: Negative for congestion, ear pain, sore throat and trouble swallowing  Eyes: Negative for photophobia and visual disturbance  Respiratory: Negative for cough and shortness of breath  Cardiovascular: Negative for chest pain and palpitations  Gastrointestinal: Positive for abdominal distention and blood in stool  Negative for anal bleeding, constipation, diarrhea, nausea, rectal pain and vomiting  Genitourinary: Negative for decreased urine volume, dysuria, frequency, hematuria and urgency  Musculoskeletal: Positive for arthralgias  Negative for myalgias  Skin: Positive for rash  Negative for color change  Neurological: Positive for dizziness and tremors  Negative for syncope, weakness, light-headedness, numbness and headaches  Hematological: Bruises/bleeds easily  Psychiatric/Behavioral: Negative for confusion, dysphoric mood and sleep disturbance  The patient is not nervous/anxious          Current Outpatient Medications on File Prior to Visit   Medication Sig   • baclofen 10 mg tablet Take 1 tablet (10 mg total) by mouth 3 (three) times a day   • buPROPion (WELLBUTRIN) 75 mg tablet Take 1 tablet (75 mg total) by mouth 2 (two) times a day   • carbidopa-levodopa (SINEMET)  mg per tablet Take 1 tablet by mouth 4 (four) times a day   • entacapone (COMTAN) 200 mg tablet take 1 tablet by mouth five times a day   • hydrOXYzine HCL (ATARAX) 50 mg tablet Take 1 tablet (50 mg total) by mouth every evening   • lisinopril (ZESTRIL) 20 mg tablet Take 1 tablet (20 mg total) by mouth daily   • meloxicam (Mobic) 15 mg tablet Take 1 tablet (15 mg total) by mouth daily   • omega-3-acid ethyl esters (LOVAZA) 1 g capsule Take 2 capsules (2 g total) by mouth 2 (two) times a day   • rosuvastatin (CRESTOR) 10 MG tablet Take 1 tablet (10 mg total) by mouth daily       Objective     /86 (BP Location: Left arm, Patient Position: Standing, Cuff Size: Standard)   Pulse 101   Temp 97 7 °F (36 5 °C) (Tympanic)   Ht 5' 10" (1 778 m)   Wt 80 6 kg (177 lb 12 8 oz)   SpO2 99%   BMI 25 51 kg/m²     Physical Exam  Vitals reviewed  Constitutional:       General: He is not in acute distress  Appearance: Normal appearance  He is not ill-appearing  HENT:      Head: Normocephalic and atraumatic  Right Ear: Tympanic membrane, ear canal and external ear normal       Left Ear: Tympanic membrane, ear canal and external ear normal       Nose: Nose normal       Mouth/Throat:      Mouth: Mucous membranes are moist       Pharynx: Oropharynx is clear  Eyes:      General: No visual field deficit  Conjunctiva/sclera: Conjunctivae normal       Pupils: Pupils are equal, round, and reactive to light  Neck:      Vascular: No carotid bruit  Cardiovascular:      Rate and Rhythm: Normal rate and regular rhythm  Pulses: Normal pulses  Heart sounds: Normal heart sounds  Comments: POCT ECG: normal sinus rhythm, rate 89  Pulmonary:      Effort: Pulmonary effort is normal       Breath sounds: Normal breath sounds  Abdominal:      General: Bowel sounds are normal  There is no distension  Palpations: Abdomen is soft  There is no mass  Tenderness: There is no abdominal tenderness  Musculoskeletal:         General: Normal range of motion  Cervical back: Normal range of motion and neck supple  Right lower leg: No edema  Left lower leg: No edema  Lymphadenopathy:      Cervical: No cervical adenopathy  Skin:     General: Skin is warm and dry  Capillary Refill: Capillary refill takes less than 2 seconds        Findings: Rash (petechial rash noted to upper extremities and anterior abdominal wall) present  Neurological:      General: No focal deficit present  Mental Status: He is alert and oriented to person, place, and time  GCS: GCS eye subscore is 4  GCS verbal subscore is 5  GCS motor subscore is 6  Cranial Nerves: Cranial nerves 2-12 are intact  No facial asymmetry  Sensory: Sensation is intact  Motor: Tremor (left leg) present  Coordination: Coordination is intact  Gait: Gait is intact     Psychiatric:         Mood and Affect: Mood normal          Behavior: Behavior normal        Catha Sandifer, CRNP

## 2023-03-16 NOTE — PATIENT INSTRUCTIONS
Dizziness   AMBULATORY CARE:   Dizziness  is a feeling of being off balance or unsteady  Common causes of dizziness are an inner ear fluid imbalance or a lack of oxygen in your blood  Dizziness may be acute (lasts 3 days or less) or chronic (lasts longer than 3 days)  You may have dizzy spells that last from seconds to a few hours  Common symptoms that may happen with dizziness:   A feeling that your surroundings are moving even though you are standing still    Ringing in your ears or hearing loss     Feeling faint or lightheaded     Weakness or unsteadiness     Double vision or eye movements you cannot control    Nausea or vomiting     Confusion    Seek care immediately if:   You have a headache and a stiff neck  You have shaking chills and a fever  You vomit over and over with no relief  Your vomit or bowel movements are red or black  You have pain in your chest, back, or abdomen  You have numbness, especially in your face, arms, or legs  You have trouble moving your arms or legs  You are confused  Contact your healthcare provider if:   You have a fever  Your symptoms do not get better with treatment  You have questions or concerns about your condition or care  Treatment for dizziness  depends on the cause  Your healthcare provider may give you oxygen or medicines to decrease your dizziness and nausea  Your provider may also refer you to a specialist  Stewart Melgozaign may need to be admitted to the hospital for treatment  Manage your symptoms:   Do not drive  or operate heavy machinery when you are dizzy  Get up slowly  from sitting or lying down  Drink plenty of liquids  Liquids help prevent dehydration  Ask how much liquid to drink each day and which liquids are best for you  Follow up with your doctor as directed:  Write down your questions so you remember to ask them during your visits    © Copyright [employer]erry[employer] 2022 Information is for End User's use only and may not be sold, redistributed or otherwise used for commercial purposes  The above information is an  only  It is not intended as medical advice for individual conditions or treatments  Talk to your doctor, nurse or pharmacist before following any medical regimen to see if it is safe and effective for you

## 2023-03-16 NOTE — ASSESSMENT & PLAN NOTE
Patient does note chronic fatigue from Parkinson's, but states recent fatigue is different in nature  Discussed with patient self-care, adequate sleep, hydration, nutrition  Will obtain labs  Return in 4 weeks for reevaluation

## 2023-03-16 NOTE — ASSESSMENT & PLAN NOTE
Patient with dizziness, fatigue, joint pain, increasing tremors  Patient does have an appointment with Parkinson's specialist, but not until September  Patient is also on wait list for earlier appointment  Advised to try to reach out to neurology office to see if a sooner appointment can be made due to increase of symptoms

## 2023-03-16 NOTE — ASSESSMENT & PLAN NOTE
Stable on current medications  Denies exacerbation of symptoms  Patient denies symptoms related to depression at this time

## 2023-03-20 ENCOUNTER — APPOINTMENT (OUTPATIENT)
Dept: LAB | Facility: CLINIC | Age: 43
End: 2023-03-20

## 2023-03-20 DIAGNOSIS — R53.82 CHRONIC FATIGUE: ICD-10-CM

## 2023-03-20 DIAGNOSIS — R10.12 LEFT UPPER QUADRANT ABDOMINAL PAIN: ICD-10-CM

## 2023-03-20 DIAGNOSIS — K92.1 BLOOD IN STOOL: ICD-10-CM

## 2023-03-20 DIAGNOSIS — R23.3 PETECHIAL RASH: ICD-10-CM

## 2023-03-20 LAB
25(OH)D3 SERPL-MCNC: 20.3 NG/ML (ref 30–100)
ALBUMIN SERPL BCP-MCNC: 4.6 G/DL (ref 3.5–5)
ALP SERPL-CCNC: 57 U/L (ref 46–116)
ALT SERPL W P-5'-P-CCNC: 25 U/L (ref 12–78)
ANA SER QL IA: NEGATIVE
ANION GAP SERPL CALCULATED.3IONS-SCNC: 3 MMOL/L (ref 4–13)
AST SERPL W P-5'-P-CCNC: 19 U/L (ref 5–45)
B BURGDOR IGG+IGM SER-ACNC: <0.2 AI
BASOPHILS # BLD AUTO: 0.04 THOUSANDS/ÂΜL (ref 0–0.1)
BASOPHILS NFR BLD AUTO: 1 % (ref 0–1)
BILIRUB SERPL-MCNC: 0.72 MG/DL (ref 0.2–1)
BUN SERPL-MCNC: 16 MG/DL (ref 5–25)
CALCIUM SERPL-MCNC: 10.1 MG/DL (ref 8.3–10.1)
CHLORIDE SERPL-SCNC: 106 MMOL/L (ref 96–108)
CO2 SERPL-SCNC: 28 MMOL/L (ref 21–32)
CREAT SERPL-MCNC: 1.13 MG/DL (ref 0.6–1.3)
CRP SERPL QL: <3 MG/L
EOSINOPHIL # BLD AUTO: 0.13 THOUSAND/ÂΜL (ref 0–0.61)
EOSINOPHIL NFR BLD AUTO: 3 % (ref 0–6)
ERYTHROCYTE [DISTWIDTH] IN BLOOD BY AUTOMATED COUNT: 11.9 % (ref 11.6–15.1)
FERRITIN SERPL-MCNC: 189 NG/ML (ref 8–388)
GFR SERPL CREATININE-BSD FRML MDRD: 80 ML/MIN/1.73SQ M
GLUCOSE P FAST SERPL-MCNC: 107 MG/DL (ref 65–99)
HCT VFR BLD AUTO: 46.2 % (ref 36.5–49.3)
HGB BLD-MCNC: 15 G/DL (ref 12–17)
IMM GRANULOCYTES # BLD AUTO: 0.01 THOUSAND/UL (ref 0–0.2)
IMM GRANULOCYTES NFR BLD AUTO: 0 % (ref 0–2)
INR PPP: 0.95 (ref 0.84–1.19)
IRON SATN MFR SERPL: 32 % (ref 20–50)
IRON SERPL-MCNC: 113 UG/DL (ref 65–175)
LYMPHOCYTES # BLD AUTO: 1.27 THOUSANDS/ÂΜL (ref 0.6–4.47)
LYMPHOCYTES NFR BLD AUTO: 27 % (ref 14–44)
MCH RBC QN AUTO: 28.9 PG (ref 26.8–34.3)
MCHC RBC AUTO-ENTMCNC: 32.5 G/DL (ref 31.4–37.4)
MCV RBC AUTO: 89 FL (ref 82–98)
MONOCYTES # BLD AUTO: 0.41 THOUSAND/ÂΜL (ref 0.17–1.22)
MONOCYTES NFR BLD AUTO: 9 % (ref 4–12)
NEUTROPHILS # BLD AUTO: 2.92 THOUSANDS/ÂΜL (ref 1.85–7.62)
NEUTS SEG NFR BLD AUTO: 60 % (ref 43–75)
NRBC BLD AUTO-RTO: 0 /100 WBCS
PLATELET # BLD AUTO: 234 THOUSANDS/UL (ref 149–390)
PMV BLD AUTO: 10 FL (ref 8.9–12.7)
POTASSIUM SERPL-SCNC: 4.1 MMOL/L (ref 3.5–5.3)
PROT SERPL-MCNC: 7.6 G/DL (ref 6.4–8.4)
PROTHROMBIN TIME: 12.9 SECONDS (ref 11.6–14.5)
RBC # BLD AUTO: 5.19 MILLION/UL (ref 3.88–5.62)
RHEUMATOID FACT SER QL LA: NEGATIVE
SODIUM SERPL-SCNC: 137 MMOL/L (ref 135–147)
TIBC SERPL-MCNC: 348 UG/DL (ref 250–450)
TSH SERPL DL<=0.05 MIU/L-ACNC: 0.91 UIU/ML (ref 0.45–4.5)
WBC # BLD AUTO: 4.78 THOUSAND/UL (ref 4.31–10.16)

## 2023-03-21 LAB
ENA SS-A AB SER-ACNC: <0.2 AI (ref 0–0.9)
ENA SS-B AB SER-ACNC: <0.2 AI (ref 0–0.9)
ENDOMYSIUM IGA SER QL: NEGATIVE
GLIADIN PEPTIDE IGA SER-ACNC: 3 UNITS (ref 0–19)
GLIADIN PEPTIDE IGG SER-ACNC: 1 UNITS (ref 0–19)
IGA SERPL-MCNC: 154 MG/DL (ref 90–386)
TTG IGA SER-ACNC: <2 U/ML (ref 0–3)
TTG IGG SER-ACNC: <2 U/ML (ref 0–5)

## 2023-03-23 DIAGNOSIS — E78.2 MIXED HYPERLIPIDEMIA: ICD-10-CM

## 2023-03-23 RX ORDER — OMEGA-3-ACID ETHYL ESTERS 1 G/1
2 CAPSULE, LIQUID FILLED ORAL 2 TIMES DAILY
Qty: 120 CAPSULE | Refills: 0 | Status: SHIPPED | OUTPATIENT
Start: 2023-03-23

## 2023-04-05 DIAGNOSIS — E78.2 MIXED HYPERLIPIDEMIA: ICD-10-CM

## 2023-04-06 ENCOUNTER — RA CDI HCC (OUTPATIENT)
Dept: OTHER | Facility: HOSPITAL | Age: 43
End: 2023-04-06

## 2023-04-06 RX ORDER — ROSUVASTATIN CALCIUM 10 MG/1
10 TABLET, COATED ORAL DAILY
Qty: 30 TABLET | Refills: 0 | Status: SHIPPED | OUTPATIENT
Start: 2023-04-06 | End: 2023-05-06

## 2023-04-06 NOTE — PROGRESS NOTES
[facility]  coding opportunities       Chart reviewed, no opportunity found:   [address]        Patients Insurance     Medicare Insurance: [employer]

## 2023-04-25 ENCOUNTER — OFFICE VISIT (OUTPATIENT)
Dept: FAMILY MEDICINE CLINIC | Facility: CLINIC | Age: 43
End: 2023-04-25

## 2023-04-25 VITALS
TEMPERATURE: 97.3 F | WEIGHT: 180 LBS | BODY MASS INDEX: 25.77 KG/M2 | HEART RATE: 87 BPM | OXYGEN SATURATION: 99 % | DIASTOLIC BLOOD PRESSURE: 84 MMHG | SYSTOLIC BLOOD PRESSURE: 122 MMHG | HEIGHT: 70 IN

## 2023-04-25 DIAGNOSIS — N20.0 RENAL CALCULI: ICD-10-CM

## 2023-04-25 DIAGNOSIS — R53.82 CHRONIC FATIGUE: ICD-10-CM

## 2023-04-25 DIAGNOSIS — E55.9 VITAMIN D INSUFFICIENCY: ICD-10-CM

## 2023-04-25 DIAGNOSIS — J84.10 LUNG GRANULOMA (HCC): Primary | ICD-10-CM

## 2023-04-25 RX ORDER — MELATONIN
1000 DAILY
Qty: 90 TABLET | Refills: 1 | Status: SHIPPED | OUTPATIENT
Start: 2023-04-25

## 2023-04-25 NOTE — ASSESSMENT & PLAN NOTE
Recent blood work reviewed with patient  We will initiate vitamin D supplement for vitamin D insufficiency

## 2023-04-25 NOTE — PROGRESS NOTES
Name: Priscila Quintana  : 1980      MRN: [id number]  Encounter Provider: LESLIE Becerra  Encounter Date: 2023   Encounter department: [facility] [address] [place] [address]     1  [facility]  Comments:  Incidental finding on CT of abdomen/pelvis  Patient denies any symptoms  Will obtain CT scan of chest in 3 to 6 months  Orders:  -     CT chest w contrast; Future; Expected date: 2023    2  Renal calculi  Comments:  Nonobstructing 3 mm stone observed on CT scan of abdomen/pelvis  Patient denies symptoms  Advised increased hydration, to seek care for pain/urinary sx  3  Vitamin D insufficiency  Assessment & Plan:  Blood work reviewed with patient  Discussed dietary options of vitamin D and healthy exposure to sunlight  Will initiate vitamin D 1000 units daily  Educated patient to take with food for better absorption  Orders:  -     cholecalciferol (VITAMIN D3) 1,000 units tablet; Take 1 tablet (1,000 Units total) by mouth daily    4  Chronic fatigue  Assessment & Plan:  Recent blood work reviewed with patient  We will initiate vitamin D supplement for vitamin D insufficiency  Subjective      Patient presents office for follow-up after recent blood work and imaging  CT scan of abdomen and pelvis reviewed with patient as well as blood work  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 discussed with patient  Found to have nonobstructing 3 mm stone on right renal pole  Patient continues to note occasional LUQ pain, denies flank pain, urinary symptoms  Patient denies n/v/d, fever, chills  Review of Systems   Constitutional: Negative for chills and fever  HENT: Negative for congestion, sore throat and trouble swallowing  Eyes: Negative for photophobia and visual disturbance  Respiratory: Negative for cough, chest tightness and shortness of breath      Cardiovascular: Negative for "chest pain and palpitations  Gastrointestinal: Positive for abdominal pain (intermittent LUQ pain)  Negative for abdominal distention, blood in stool, constipation, diarrhea, nausea and vomiting  Genitourinary: Negative for decreased urine volume, flank pain, frequency, hematuria, testicular pain and urgency  Musculoskeletal: Negative for arthralgias and myalgias  Skin: Negative for color change and rash  Neurological: Negative for dizziness, weakness, light-headedness and headaches  Psychiatric/Behavioral: Negative for confusion  Current Outpatient Medications on File Prior to Visit   Medication Sig   • baclofen 10 mg tablet Take 1 tablet (10 mg total) by mouth 3 (three) times a day   • buPROPion (WELLBUTRIN) 75 mg tablet Take 1 tablet (75 mg total) by mouth 2 (two) times a day   • carbidopa-levodopa (SINEMET)  mg per tablet Take 1 tablet by mouth 4 (four) times a day   • entacapone (COMTAN) 200 mg tablet take 1 tablet by mouth five times a day   • hydrOXYzine HCL (ATARAX) 50 mg tablet Take 1 tablet (50 mg total) by mouth every evening   • lisinopril (ZESTRIL) 20 mg tablet Take 1 tablet (20 mg total) by mouth daily   • meloxicam (Mobic) 15 mg tablet Take 1 tablet (15 mg total) by mouth daily   • omega-3-acid ethyl esters (LOVAZA) 1 g capsule Take 2 capsules (2 g total) by mouth 2 (two) times a day   • rosuvastatin (CRESTOR) 10 MG tablet Take 1 tablet (10 mg total) by mouth daily       Objective     /84 (BP Location: Left arm, Patient Position: Sitting, Cuff Size: Standard)   Pulse 87   Temp (!) 97 3 °F (36 3 °C) (Tympanic)   Ht 5' 10\" (1 778 m)   Wt 81 6 kg (180 lb)   SpO2 99%   BMI 25 83 kg/m²     Physical Exam  Vitals reviewed  Constitutional:       General: He is not in acute distress  Appearance: Normal appearance  He is not ill-appearing  HENT:      Head: Normocephalic and atraumatic        Right Ear: Tympanic membrane, ear canal and external ear normal       Left " Ear: Tympanic membrane, ear canal and external ear normal       Nose: Nose normal       Mouth/Throat:      Mouth: Mucous membranes are moist       Pharynx: Oropharynx is clear  Eyes:      Conjunctiva/sclera: Conjunctivae normal       Pupils: Pupils are equal, round, and reactive to light  Cardiovascular:      Rate and Rhythm: Normal rate and regular rhythm  Pulses: Normal pulses  Heart sounds: Normal heart sounds  No murmur heard  Pulmonary:      Effort: Pulmonary effort is normal       Breath sounds: Normal breath sounds  Abdominal:      General: Abdomen is flat  Bowel sounds are normal       Palpations: Abdomen is soft  Tenderness: There is no abdominal tenderness  There is no right CVA tenderness or left CVA tenderness  Musculoskeletal:         General: Normal range of motion  Cervical back: Normal range of motion and neck supple  Right lower leg: No edema  Left lower leg: No edema  Lymphadenopathy:      Cervical: No cervical adenopathy  Skin:     General: Skin is warm and dry  Capillary Refill: Capillary refill takes less than 2 seconds  Neurological:      General: No focal deficit present  Mental Status: He is alert and oriented to person, place, and time     Psychiatric:         Mood and Affect: Mood normal          Behavior: Behavior normal        LESLIE Schroeder

## 2023-04-25 NOTE — PATIENT INSTRUCTIONS
Vitamin D Deficiency   AMBULATORY CARE:   Vitamin D deficiency  is a low level of vitamin D in your body  Vitamin D helps your body absorb calcium from foods  Your body makes vitamin D when your skin is exposed to sunlight  You can also get vitamin D from certain foods  Most of the vitamin D in your body comes from sunlight exposure  Common symptoms include the following:  Low levels of vitamin D can lead to weak and brittle bones that are more likely to fracture  You may not have any signs and symptoms, or you may have any of the following:  Bone pain or discomfort in your lower back, pelvis, or legs    Muscle aches and weakness    Low back pain in women    Poor growth, irritability, and frequent respiratory tract infections in infants    Deformed bones and slow growth in children    Call your doctor or dietitian if:   You continue to have symptoms, or your symptoms get worse  You think you took too much of a vitamin D supplement, and you have nausea, vomiting, or a headache  You have questions or concerns about your condition or care  Treatment for vitamin D deficiency  includes high doses of vitamin D for 8 to 12 weeks to increase your levels  Your levels will then be rechecked  If your levels are still low, you will need to take vitamin D supplements for another 8 weeks  After your levels have gone back to normal, you may need to continue to take a vitamin D supplement  Amount of vitamin D do you need each day:  The amount of vitamin D you need depends on your age  You may need more than the recommended amounts below if you take certain medicines or you are obese  Ask your healthcare provider how much vitamin D you need  Infants up to 1 year of age: 0 international units (IU)    Children 1 year and older: 600 IU    Adults aged 23to 79years old: 600 IU    Adults older than 70 years: 800 IU    Prevent vitamin D deficiency:   Eat foods that are high in vitamin D    Fatty fish such as mackerel, canned tuna and sardines, and salmon are good sources of vitamin D  Eggs, almonds, and meat such as liver are also good sources  Certain foods such as milk, juice, and cereal are fortified with vitamin D  Give your  infant a vitamin D supplement  of 400 IU each day  Take vitamin D supplements as directed  High doses of vitamin D can be toxic  Your healthcare provider will tell you how much vitamin D you should take each day  Vitamin D is best absorbed when taken with food  Expose your skin to sunlight as directed  Ask your healthcare provider how you can safely expose your skin to sunlight and for how long  Too much exposure to sunlight can cause skin cancer  Follow up with your doctor or dietitian as directed:  Write down your questions so you remember to ask them during your visits  © Copyright [employer] 2022 Information is for End User's use only and may not be sold, redistributed or otherwise used for commercial purposes  The above information is an  only  It is not intended as medical advice for individual conditions or treatments  Talk to your doctor, nurse or pharmacist before following any medical regimen to see if it is safe and effective for you

## 2023-04-25 NOTE — ASSESSMENT & PLAN NOTE
Blood work reviewed with patient  Discussed dietary options of vitamin D and healthy exposure to sunlight  Will initiate vitamin D 1000 units daily  Educated patient to take with food for better absorption

## 2023-05-01 DIAGNOSIS — E78.2 MIXED HYPERLIPIDEMIA: ICD-10-CM

## 2023-05-02 RX ORDER — ROSUVASTATIN CALCIUM 10 MG/1
10 TABLET, COATED ORAL DAILY
Qty: 30 TABLET | Refills: 0 | Status: SHIPPED | OUTPATIENT
Start: 2023-05-02 | End: 2023-06-01

## 2023-05-15 DIAGNOSIS — E78.2 MIXED HYPERLIPIDEMIA: ICD-10-CM

## 2023-05-15 RX ORDER — OMEGA-3-ACID ETHYL ESTERS 1 G/1
2 CAPSULE, LIQUID FILLED ORAL 2 TIMES DAILY
Qty: 120 CAPSULE | Refills: 0 | Status: SHIPPED | OUTPATIENT
Start: 2023-05-15

## 2023-05-23 ENCOUNTER — TELEPHONE (OUTPATIENT)
Dept: NEUROLOGY | Facility: CLINIC | Age: 43
End: 2023-05-23

## 2023-05-30 DIAGNOSIS — E78.2 MIXED HYPERLIPIDEMIA: ICD-10-CM

## 2023-05-30 DIAGNOSIS — I10 PRIMARY HYPERTENSION: ICD-10-CM

## 2023-05-30 RX ORDER — LISINOPRIL 20 MG/1
20 TABLET ORAL DAILY
Qty: 90 TABLET | Refills: 1 | Status: SHIPPED | OUTPATIENT
Start: 2023-05-30

## 2023-05-30 RX ORDER — ROSUVASTATIN CALCIUM 10 MG/1
10 TABLET, COATED ORAL DAILY
Qty: 90 TABLET | Refills: 1 | Status: SHIPPED | OUTPATIENT
Start: 2023-05-30 | End: 2023-11-26

## 2023-06-08 DIAGNOSIS — E78.2 MIXED HYPERLIPIDEMIA: ICD-10-CM

## 2023-06-08 RX ORDER — OMEGA-3-ACID ETHYL ESTERS 1 G/1
CAPSULE, LIQUID FILLED ORAL
Qty: 120 CAPSULE | Refills: 0 | Status: SHIPPED | OUTPATIENT
Start: 2023-06-08

## 2023-06-29 DIAGNOSIS — F32.0 CURRENT MILD EPISODE OF MAJOR DEPRESSIVE DISORDER WITHOUT PRIOR EPISODE (HCC): ICD-10-CM

## 2023-06-29 RX ORDER — HYDROXYZINE 50 MG/1
50 TABLET, FILM COATED ORAL EVERY EVENING
Qty: 30 TABLET | Refills: 0 | Status: SHIPPED | OUTPATIENT
Start: 2023-06-29

## 2023-07-10 DIAGNOSIS — E78.2 MIXED HYPERLIPIDEMIA: ICD-10-CM

## 2023-07-10 RX ORDER — OMEGA-3-ACID ETHYL ESTERS 1 G/1
CAPSULE, LIQUID FILLED ORAL
Qty: 120 CAPSULE | Refills: 0 | Status: SHIPPED | OUTPATIENT
Start: 2023-07-10

## 2023-07-16 ENCOUNTER — RA CDI HCC (OUTPATIENT)
Dept: OTHER | Facility: HOSPITAL | Age: 43
End: 2023-07-16

## 2023-07-16 NOTE — PROGRESS NOTES
[address] [facility] coding opportunities       Chart reviewed, no opportunity found: [address]        Patients Insurance     Medicare Insurance: [employer]

## 2023-07-24 ENCOUNTER — TELEPHONE (OUTPATIENT)
Dept: FAMILY MEDICINE CLINIC | Facility: CLINIC | Age: 43
End: 2023-07-24

## 2023-07-24 DIAGNOSIS — E78.2 MIXED HYPERLIPIDEMIA: Primary | ICD-10-CM

## 2023-07-24 DIAGNOSIS — F32.0 CURRENT MILD EPISODE OF MAJOR DEPRESSIVE DISORDER WITHOUT PRIOR EPISODE (HCC): ICD-10-CM

## 2023-07-24 RX ORDER — HYDROXYZINE 50 MG/1
50 TABLET, FILM COATED ORAL EVERY EVENING
Qty: 30 TABLET | Refills: 0 | Status: SHIPPED | OUTPATIENT
Start: 2023-07-24

## 2023-07-24 NOTE — TELEPHONE ENCOUNTER
Spoke with patient- told him that script was ready and to get his blood work done today or tomorrow morning prior to his CT scan on Wednesday. Patient stated understanding.

## 2023-07-24 NOTE — TELEPHONE ENCOUNTER
As per [facility] MRI, patient will need bloodwork prior to their CT this Wednesday. BUN/Creat/GFR or CMP    Please let me know when it is entered and I will give patient a call. Thank you!

## 2023-07-25 ENCOUNTER — OFFICE VISIT (OUTPATIENT)
Dept: FAMILY MEDICINE CLINIC | Facility: CLINIC | Age: 43
End: 2023-07-25
Payer: COMMERCIAL

## 2023-07-25 ENCOUNTER — APPOINTMENT (OUTPATIENT)
Dept: LAB | Facility: CLINIC | Age: 43
End: 2023-07-25
Payer: COMMERCIAL

## 2023-07-25 VITALS
TEMPERATURE: 98.6 F | BODY MASS INDEX: 26.28 KG/M2 | HEIGHT: 70 IN | DIASTOLIC BLOOD PRESSURE: 86 MMHG | OXYGEN SATURATION: 97 % | HEART RATE: 96 BPM | SYSTOLIC BLOOD PRESSURE: 120 MMHG | WEIGHT: 183.6 LBS

## 2023-07-25 DIAGNOSIS — E78.2 MIXED HYPERLIPIDEMIA: ICD-10-CM

## 2023-07-25 DIAGNOSIS — G20 PARKINSON'S DISEASE (HCC): ICD-10-CM

## 2023-07-25 DIAGNOSIS — M89.8X6 BONE PAIN OF LOWER LEG: Primary | ICD-10-CM

## 2023-07-25 DIAGNOSIS — I10 PRIMARY HYPERTENSION: ICD-10-CM

## 2023-07-25 DIAGNOSIS — Z00.00 MEDICARE ANNUAL WELLNESS VISIT, SUBSEQUENT: ICD-10-CM

## 2023-07-25 PROBLEM — F17.200 CURRENT EVERY DAY SMOKER: Status: RESOLVED | Noted: 2021-01-11 | Resolved: 2023-07-25

## 2023-07-25 LAB
ALBUMIN SERPL BCP-MCNC: 4.1 G/DL (ref 3.5–5)
ALP SERPL-CCNC: 59 U/L (ref 46–116)
ALT SERPL W P-5'-P-CCNC: 48 U/L (ref 12–78)
ANION GAP SERPL CALCULATED.3IONS-SCNC: 5 MMOL/L
AST SERPL W P-5'-P-CCNC: 24 U/L (ref 5–45)
BILIRUB SERPL-MCNC: 0.49 MG/DL (ref 0.2–1)
BUN SERPL-MCNC: 13 MG/DL (ref 5–25)
CALCIUM SERPL-MCNC: 9.6 MG/DL (ref 8.3–10.1)
CHLORIDE SERPL-SCNC: 109 MMOL/L (ref 96–108)
CO2 SERPL-SCNC: 26 MMOL/L (ref 21–32)
CREAT SERPL-MCNC: 1.01 MG/DL (ref 0.6–1.3)
GFR SERPL CREATININE-BSD FRML MDRD: 91 ML/MIN/1.73SQ M
GLUCOSE P FAST SERPL-MCNC: 115 MG/DL (ref 65–99)
POTASSIUM SERPL-SCNC: 4.4 MMOL/L (ref 3.5–5.3)
PROT SERPL-MCNC: 7.4 G/DL (ref 6.4–8.4)
SODIUM SERPL-SCNC: 140 MMOL/L (ref 135–147)

## 2023-07-25 PROCEDURE — 99213 OFFICE O/P EST LOW 20 MIN: CPT | Performed by: NURSE PRACTITIONER

## 2023-07-25 PROCEDURE — 36415 COLL VENOUS BLD VENIPUNCTURE: CPT

## 2023-07-25 PROCEDURE — G0439 PPPS, SUBSEQ VISIT: HCPCS | Performed by: NURSE PRACTITIONER

## 2023-07-25 PROCEDURE — 80053 COMPREHEN METABOLIC PANEL: CPT

## 2023-07-25 NOTE — PATIENT INSTRUCTIONS
Medicare Preventive Visit Patient Instructions  Thank you for completing your Welcome to Medicare Visit or Medicare Annual Wellness Visit today. Your next wellness visit will be due in one year (7/25/2024). The screening/preventive services that you may require over the next 5-10 years are detailed below. Some tests may not apply to you based off risk factors and/or age. Screening tests ordered at today's visit but not completed yet may show as past due. Also, please note that scanned in results may not display below. Preventive Screenings:  Service Recommendations Previous Testing/Comments   Colorectal Cancer Screening  · Colonoscopy    · Fecal Occult Blood Test (FOBT)/Fecal Immunochemical Test (FIT)  · Fecal DNA/Cologuard Test  · Flexible Sigmoidoscopy Age: 43-73 years old   Colonoscopy: every 10 years (May be performed more frequently if at higher risk)  OR  FOBT/FIT: every 1 year  OR  Cologuard: every 3 years  OR  Sigmoidoscopy: every 5 years  Screening may be recommended earlier than age 39 if at higher risk for colorectal cancer. Also, an individualized decision between you and your healthcare provider will decide whether screening between the ages of 77-80 would be appropriate.  Colonoscopy: Not on file  FOBT/FIT: Not on file  Cologuard: Not on file  Sigmoidoscopy: Not on file          Prostate Cancer Screening Individualized decision between patient and health care provider in men between ages of 53-66   Medicare will cover every 12 months beginning on the day after your 50th birthday PSA: No results in last 5 years     Screening Not Indicated     Hepatitis C Screening Once for adults born between 1945 and 1965  More frequently in patients at high risk for Hepatitis C Hep C Antibody: Not on file        Diabetes Screening 1-2 times per year if you're at risk for diabetes or have pre-diabetes Fasting glucose: 107 mg/dL (3/20/2023)  A1C: 5.5 % (8/9/2022)  Screening Current   Cholesterol Screening Once every 5 years if you don't have a lipid disorder. May order more often based on risk factors. Lipid panel: 08/09/2022  Screening Not Indicated  History Lipid Disorder      Other Preventive Screenings Covered by Medicare:  1. Abdominal Aortic Aneurysm (AAA) Screening: covered once if your at risk. You're considered to be at risk if you have a family history of AAA or a male between the age of 70-76 who smoking at least 100 cigarettes in your lifetime. 2. Lung Cancer Screening: covers low dose CT scan once per year if you meet all of the following conditions: (1) Age 48-67; (2) No signs or symptoms of lung cancer; (3) Current smoker or have quit smoking within the last 15 years; (4) You have a tobacco smoking history of at least 20 pack years (packs per day x number of years you smoked); (5) You get a written order from a healthcare provider. 3. Glaucoma Screening: covered annually if you're considered high risk: (1) You have diabetes OR (2) Family history of glaucoma OR (3)  aged 48 and older OR (3)  American aged 72 and older  3. Osteoporosis Screening: covered every 2 years if you meet one of the following conditions: (1) Have a vertebral abnormality; (2) On glucocorticoid therapy for more than 3 months; (3) Have primary hyperparathyroidism; (4) On osteoporosis medications and need to assess response to drug therapy. 5. HIV Screening: covered annually if you're between the age of 14-79. Also covered annually if you are younger than 13 and older than 72 with risk factors for HIV infection. For pregnant patients, it is covered up to 3 times per pregnancy.     Immunizations:  Immunization Recommendations   Influenza Vaccine Annual influenza vaccination during flu season is recommended for all persons aged >= 6 months who do not have contraindications   Pneumococcal Vaccine   * Pneumococcal conjugate vaccine = PCV13 (Prevnar 13), PCV15 (Vaxneuvance), PCV20 (Prevnar 20)  * Pneumococcal polysaccharide vaccine = PPSV23 (Pneumovax) Adults 2364 years old: 1-3 doses may be recommended based on certain risk factors  Adults 72 years old: 1-2 doses may be recommended based off what pneumonia vaccine you previously received   Hepatitis B Vaccine 3 dose series if at intermediate or high risk (ex: diabetes, end stage renal disease, liver disease)   Tetanus (Td) Vaccine - COST NOT COVERED BY MEDICARE PART B Following completion of primary series, a booster dose should be given every 10 years to maintain immunity against tetanus. Td may also be given as tetanus wound prophylaxis. Tdap Vaccine - COST NOT COVERED BY MEDICARE PART B Recommended at least once for all adults. For pregnant patients, recommended with each pregnancy. Shingles Vaccine (Shingrix) - COST NOT COVERED BY MEDICARE PART B  2 shot series recommended in those aged 48 and above     Health Maintenance Due:      Topic Date Due   • Hepatitis C Screening  Never done   • HIV Screening  Completed     Immunizations Due:      Topic Date Due   • COVID-19 Vaccine (1) Never done   • Influenza Vaccine (1) 09/01/2023     Advance Directives   What are advance directives? Advance directives are legal documents that state your wishes and plans for medical care. These plans are made ahead of time in case you lose your ability to make decisions for yourself. Advance directives can apply to any medical decision, such as the treatments you want, and if you want to donate organs. What are the types of advance directives? There are many types of advance directives, and each state has rules about how to use them. You may choose a combination of any of the following:  · Living will: This is a written record of the treatment you want. You can also choose which treatments you do not want, which to limit, and which to stop at a certain time. This includes surgery, medicine, IV fluid, and tube feedings. · Durable power of  for healthcare [facility]):   This is a written record that states who you want to make healthcare choices for you when you are unable to make them for yourself. This person, called a proxy, is usually a family member or a friend. You may choose more than 1 proxy. · Do not resuscitate (DNR) order:  A DNR order is used in case your heart stops beating or you stop breathing. It is a request not to have certain forms of treatment, such as CPR. A DNR order may be included in other types of advance directives. · Medical directive: This covers the care that you want if you are in a coma, near death, or unable to make decisions for yourself. You can list the treatments you want for each condition. Treatment may include pain medicine, surgery, blood transfusions, dialysis, IV or tube feedings, and a ventilator (breathing machine). · Values history: This document has questions about your views, beliefs, and how you feel and think about life. This information can help others choose the care that you would choose. Why are advance directives important? An advance directive helps you control your care. Although spoken wishes may be used, it is better to have your wishes written down. Spoken wishes can be misunderstood, or not followed. Treatments may be given even if you do not want them. An advance directive may make it easier for your family to make difficult choices about your care. Weight Management   Why it is important to manage your weight:  Being overweight increases your risk of health conditions such as heart disease, high blood pressure, type 2 diabetes, and certain types of cancer. It can also increase your risk for osteoarthritis, sleep apnea, and other respiratory problems. Aim for a slow, steady weight loss. Even a small amount of weight loss can lower your risk of health problems. How to lose weight safely:  A safe and healthy way to lose weight is to eat fewer calories and get regular exercise.  You can lose up about 1 pound a week by decreasing the number of calories you eat by 500 calories each day. Healthy meal plan for weight management:  A healthy meal plan includes a variety of foods, contains fewer calories, and helps you stay healthy. A healthy meal plan includes the following:  · Eat whole-grain foods more often. A healthy meal plan should contain fiber. Fiber is the part of grains, fruits, and vegetables that is not broken down by your body. Whole-grain foods are healthy and provide extra fiber in your diet. Some examples of whole-grain foods are whole-wheat breads and pastas, oatmeal, brown rice, and bulgur. · Eat a variety of vegetables every day. Include dark, leafy greens such as spinach, kale, halie greens, and mustard greens. Eat yellow and orange vegetables such as carrots, sweet potatoes, and winter squash. · Eat a variety of fruits every day. Choose fresh or canned fruit (canned in its own juice or light syrup) instead of juice. Fruit juice has very little or no fiber. · Eat low-fat dairy foods. Drink fat-free (skim) milk or 1% milk. Eat fat-free yogurt and low-fat cottage cheese. Try low-fat cheeses such as mozzarella and other reduced-fat cheeses. · Choose meat and other protein foods that are low in fat. Choose beans or other legumes such as split peas or lentils. Choose fish, skinless poultry (chicken or turkey), or lean cuts of red meat (beef or pork). Before you cook meat or poultry, cut off any visible fat. · Use less fat and oil. Try baking foods instead of frying them. Add less fat, such as margarine, sour cream, regular salad dressing and mayonnaise to foods. Eat fewer high-fat foods. Some examples of high-fat foods include french fries, doughnuts, ice cream, and cakes. · Eat fewer sweets. Limit foods and drinks that are high in sugar. This includes candy, cookies, regular soda, and sweetened drinks. Exercise:  Exercise at least 30 minutes per day on most days of the week.  Some examples of exercise include walking, biking, dancing, and swimming. You can also fit in more physical activity by taking the stairs instead of the elevator or parking farther away from stores. Ask your healthcare provider about the best exercise plan for you. © Copyright [employer] 2018 Information is for End User's use only and may not be sold, redistributed or otherwise used for commercial purposes.  All illustrations and images included in CareNotes® are the copyrighted property of [employer] or [address] [place] [address]

## 2023-07-25 NOTE — PROGRESS NOTES
Assessment and Plan:     Problem List Items Addressed This Visit        Cardiovascular and Mediastinum    Hypertension     Blood pressure managed in office today. To continue with medications as prescribed. Nervous and Auditory    Parkinson's disease [facility]) - early onset     Has appointment with neurology on 7/31. Other Visit Diagnoses     Bone pain of lower leg    -  Primary    To obtain x-rays as ordered. Advised ROM exercises, heat therapy, Mobic as prescribed. Relevant Orders    XR tibia fibula 2 vw left    XR tibia fibula 2 vw right    Medicare annual wellness visit, subsequent            BMI Counseling: Body mass index is 26.34 kg/m². The BMI is above normal. Nutrition recommendations include decreasing portion sizes, encouraging healthy choices of fruits and vegetables, consuming healthier snacks, limiting drinks that contain sugar, moderation in carbohydrate intake, increasing intake of lean protein, reducing intake of saturated and trans fat and reducing intake of cholesterol. Exercise recommendations include exercising 3-5 times per week and strength training exercises. No pharmacotherapy was ordered. Rationale for BMI follow-up plan is due to patient being overweight or obese. Preventive health issues were discussed with patient, and age appropriate screening tests were ordered as noted in patient's After Visit Summary. Personalized health advice and appropriate referrals for health education or preventive services given if needed, as noted in patient's After Visit Summary. History of Present Illness:     Patient presents for a Medicare Wellness Visit    Patient presents the office for Medicare wellness visit. Patient has an appointment with neurology for Parkinson's this week. Was moved up from September. Has ct scan of chest scheduled for tomorrow after incidental finding of lung granuloma on prior imaging. Patient denies chest pain, cough, SOB, hemoptysis. Patient with complaints of bilateral leg pain. As per patient, symptoms have been ongoing for the last couple of weeks. Symptoms are waxing and waning. Patient notes discomfort as a "dull ache" in bilateral lower legs. Notes pain is located in his shin area. Worse worse when laying down. Patient notes symptoms can last an hour or up to 1 to 1-1/2 days. Patient has not been taking any medication for pain. Patient denies swelling of joints, inability to bear weight, night sweats, unexplained weight loss. Patient Care Team:  Anna Plasencia as PCP - General (Family Medicine)     Review of Systems:     Review of Systems   Constitutional: Negative for activity change, appetite change, diaphoresis, fatigue, fever and unexpected weight change. HENT: Negative for congestion, ear pain and sore throat. Eyes: Negative for photophobia and visual disturbance. Respiratory: Negative for cough, chest tightness and shortness of breath. Cardiovascular: Negative for chest pain, palpitations and leg swelling. Gastrointestinal: Negative for abdominal pain, blood in stool, constipation, diarrhea, nausea and vomiting. Genitourinary: Negative for decreased urine volume, dysuria, flank pain, frequency, hematuria and urgency. Musculoskeletal: Positive for arthralgias. Negative for back pain and myalgias. Skin: Negative for color change and rash. Neurological: Positive for tremors. Negative for dizziness, seizures, syncope, speech difficulty, weakness, light-headedness, numbness and headaches. Psychiatric/Behavioral: Negative for confusion and dysphoric mood. The patient is not nervous/anxious.          Problem List:     Patient Active Problem List   Diagnosis   • Parkinson's disease ([address]) - early onset   • Hypertriglyceridemia   • Hypertension   • Current mild episode of major depressive disorder without prior episode (HCC)   • Osteoarthritis of left hip   • Dizziness   • Chronic fatigue   • Vitamin D insufficiency      Past Medical and Surgical History:     Past Medical History:   Diagnosis Date   • Parkinson's disease ([address])      History reviewed. No pertinent surgical history. Family History:     Family History   Problem Relation Age of Onset   • Hypertension Mother    • Lung cancer Father    • Hypertension Father    • Cancer Father         Passed away 22   • Parkinsonism Paternal Uncle       Social History:     Social History     Socioeconomic History   • Marital status: /Civil Union     Spouse name: None   • Number of children: None   • Years of education: None   • Highest education level: None   Occupational History   • None   Tobacco Use   • Smoking status: Former     Packs/day: 0.50     Years: 15.00     Total pack years: 7.50     Types: Cigarettes     Quit date: 2021     Years since quittin.7   • Smokeless tobacco: Never   Vaping Use   • Vaping Use: Some days   • Substances: Nicotine   Substance and Sexual Activity   • Alcohol use: Yes     Comment:    • Drug use: No   • Sexual activity: None   Other Topics Concern   • None   Social History Narrative   • None     Social Determinants of Health     Financial Resource Strain: Low Risk  (2023)    Overall Financial Resource Strain (CARDIA)    • Difficulty of Paying Living Expenses: Not very hard   Food Insecurity: Not on file   Transportation Needs: No Transportation Needs (2023)    PRAPARE - Transportation    • Lack of Transportation (Medical): No    • Lack of Transportation (Non-Medical):  No   Physical Activity: Not on file   Stress: Not on file   Social Connections: Not on file   Intimate Partner Violence: Not on file   Housing Stability: Not on file      Medications and Allergies:     Current Outpatient Medications   Medication Sig Dispense Refill   • baclofen 10 mg tablet Take 1 tablet (10 mg total) by mouth 3 (three) times a day 90 tablet 0   • buPROPion (WELLBUTRIN) 75 mg tablet Take 1 tablet (75 mg total) by mouth 2 (two) times a day 60 tablet 1   • carbidopa-levodopa (SINEMET)  mg per tablet Take 1 tablet by mouth 4 (four) times a day     • cholecalciferol (VITAMIN D3) 1,000 units tablet Take 1 tablet (1,000 Units total) by mouth daily 90 tablet 1   • entacapone (COMTAN) 200 mg tablet take 1 tablet by mouth five times a day     • hydrOXYzine HCL (ATARAX) 50 mg tablet Take 1 tablet (50 mg total) by mouth every evening 30 tablet 0   • lisinopril (ZESTRIL) 20 mg tablet Take 1 tablet (20 mg total) by mouth daily 90 tablet 1   • meloxicam (Mobic) 15 mg tablet Take 1 tablet (15 mg total) by mouth daily 30 tablet 1   • omega-3-acid ethyl esters (LOVAZA) 1 g capsule take 2 capsules by mouth twice a day 120 capsule 0   • rosuvastatin (CRESTOR) 10 MG tablet Take 1 tablet (10 mg total) by mouth daily 90 tablet 1     No current facility-administered medications for this visit. No Known Allergies   Immunizations:     Immunization History   Administered Date(s) Administered   • DTaP 5 04/01/2014      Health Maintenance:         Topic Date Due   • Hepatitis C Screening  Never done   • HIV Screening  Completed         Topic Date Due   • COVID-19 Vaccine (1) Never done   • Influenza Vaccine (1) 09/01/2023      Medicare Screening Tests and Risk Assessments:     Christina Rae is here for his Subsequent Wellness visit. Health Risk Assessment:   Patient rates overall health as fair. Patient feels that their physical health rating is slightly worse. Patient is satisfied with their life. Eyesight was rated as same. Hearing was rated as same. Patient feels that their emotional and mental health rating is same. Patients states they are never, rarely angry. Patient states they are always unusually tired/fatigued. Pain experienced in the last 7 days has been a lot. Patient's pain rating has been 5/10. Patient states that he has experienced no weight loss or gain in last 6 months.      Depression Screening:   PHQ-9 Score: 10      Fall Risk Screening: In the past year, patient has experienced: history of falling in past year    Injured during fall?: No    Feels unsteady when standing or walking?: No    Worried about falling?: No      Home Safety:  Patient has trouble with stairs inside or outside of their home. Patient has working smoke alarms and has working carbon monoxide detector. Home safety hazards include: none. Nutrition:   Current diet is Regular. Medications:   Patient is not currently taking any over-the-counter supplements. Patient is able to manage medications. Activities of Daily Living (ADLs)/Instrumental Activities of Daily Living (IADLs):   Walk and transfer into and out of bed and chair?: Yes  Dress and groom yourself?: Yes    Bathe or shower yourself?: Yes    Feed yourself? Yes  Do your laundry/housekeeping?: Yes  Manage your money, pay your bills and track your expenses?: Yes  Make your own meals?: Yes    Do your own shopping?: Yes    ADL comments: Lives in ranch home with wife and family.   Drives short distances    Previous Hospitalizations:   Any hospitalizations or ED visits within the last 12 months?: No      Advance Care Planning:   Living will: No    Durable POA for healthcare: No    Advanced directive: No      Cognitive Screening:   Provider or family/friend/caregiver concerned regarding cognition?: No    PREVENTIVE SCREENINGS      Cardiovascular Screening:    General: Screening Not Indicated, History Lipid Disorder and Screening Current      Diabetes Screening:     General: Screening Current      Colorectal Cancer Screening:     General: Screening Not Indicated      Prostate Cancer Screening:    General: Screening Not Indicated      Osteoporosis Screening:    General: Screening Not Indicated      Abdominal Aortic Aneurysm (AAA) Screening:    Risk factors include: tobacco use        Lung Cancer Screening:     General: Screening Not Indicated    Screening, Brief Intervention, and Referral to Treatment (SBIRT)    Screening  Typical number of drinks in a day: 0  Typical number of drinks in a week: 2  Interpretation: Low risk drinking behavior. AUDIT-C Screenin) How often did you have a drink containing alcohol in the past year? 2 to 4 times a month  2) How many drinks did you have on a typical day when you were drinking in the past year? 0  3) How often did you have 6 or more drinks on one occasion in the past year? never    AUDIT-C Score: 2  Interpretation: Score 0-3 (male): Negative screen for alcohol misuse    Single Item Drug Screening:  How often have you used an illegal drug (including marijuana) or a prescription medication for non-medical reasons in the past year? never    Single Item Drug Screen Score: 0  Interpretation: Negative screen for possible drug use disorder    No results found. Physical Exam:     /86 (BP Location: Left arm, Patient Position: Sitting, Cuff Size: Standard)   Pulse 96   Temp 98.6 °F (37 °C) (Tympanic)   Ht 5' 10" (1.778 m)   Wt 83.3 kg (183 lb 9.6 oz)   SpO2 97%   BMI 26.34 kg/m²     Physical Exam  Vitals reviewed. Constitutional:       General: He is not in acute distress. Appearance: Normal appearance. He is well-developed. He is not ill-appearing. HENT:      Head: Normocephalic and atraumatic. Right Ear: Tympanic membrane, ear canal and external ear normal.      Left Ear: Tympanic membrane, ear canal and external ear normal.      Nose: Nose normal.      Mouth/Throat:      Mouth: Mucous membranes are moist.      Pharynx: Oropharynx is clear. Eyes:      General: No visual field deficit. Extraocular Movements: Extraocular movements intact. Conjunctiva/sclera: Conjunctivae normal.      Pupils: Pupils are equal, round, and reactive to light. Neck:      Vascular: No carotid bruit. Cardiovascular:      Rate and Rhythm: Normal rate and regular rhythm. Pulses: Normal pulses. Heart sounds: Normal heart sounds.  No murmur heard.  Pulmonary:      Effort: Pulmonary effort is normal. No respiratory distress. Breath sounds: Normal breath sounds. Abdominal:      General: Bowel sounds are normal.      Palpations: Abdomen is soft. Tenderness: There is no abdominal tenderness. There is no right CVA tenderness or left CVA tenderness. Musculoskeletal:         General: No swelling. Normal range of motion. Cervical back: Normal range of motion and neck supple. Right lower leg: No swelling, tenderness or bony tenderness. No edema. Left lower leg: No swelling, tenderness or bony tenderness. No edema. Lymphadenopathy:      Cervical: No cervical adenopathy. Skin:     General: Skin is warm and dry. Capillary Refill: Capillary refill takes less than 2 seconds. Neurological:      General: No focal deficit present. Mental Status: He is alert and oriented to person, place, and time. GCS: GCS eye subscore is 4. GCS verbal subscore is 5. GCS motor subscore is 6. Cranial Nerves: No facial asymmetry. Motor: Tremor (fine tremor noted in right hand while at rest) present.    Psychiatric:         Mood and Affect: Mood normal.         Behavior: Behavior normal.          LESLEI Shaffer

## 2023-07-26 ENCOUNTER — HOSPITAL ENCOUNTER (OUTPATIENT)
Dept: CT IMAGING | Facility: CLINIC | Age: 43
Discharge: HOME | End: 2023-07-26
Payer: COMMERCIAL

## 2023-07-26 DIAGNOSIS — J84.10 LUNG GRANULOMA (HCC): ICD-10-CM

## 2023-07-26 PROCEDURE — G1004 CDSM NDSC: HCPCS

## 2023-07-26 PROCEDURE — 71260 CT THORAX DX C+: CPT

## 2023-07-26 RX ADMIN — IOHEXOL 85 ML: 350 INJECTION, SOLUTION INTRAVENOUS at 10:13

## 2023-07-27 ENCOUNTER — TELEPHONE (OUTPATIENT)
Dept: FAMILY MEDICINE CLINIC | Facility: CLINIC | Age: 43
End: 2023-07-27

## 2023-07-27 NOTE — TELEPHONE ENCOUNTER
----- Message from Melissa Saldaña, [address] [facility] sent at 7/27/2023 11:30 AM EDT -----  Lab work is good

## 2023-07-31 ENCOUNTER — OFFICE VISIT (OUTPATIENT)
Dept: NEUROLOGY | Facility: CLINIC | Age: 43
End: 2023-07-31
Payer: COMMERCIAL

## 2023-07-31 VITALS
TEMPERATURE: 97.4 F | SYSTOLIC BLOOD PRESSURE: 130 MMHG | HEART RATE: 87 BPM | BODY MASS INDEX: 26.27 KG/M2 | DIASTOLIC BLOOD PRESSURE: 78 MMHG | WEIGHT: 183.1 LBS

## 2023-07-31 DIAGNOSIS — G20 PARKINSON'S DISEASE (HCC): Primary | ICD-10-CM

## 2023-07-31 DIAGNOSIS — R53.82 CHRONIC FATIGUE: ICD-10-CM

## 2023-07-31 DIAGNOSIS — F32.0 CURRENT MILD EPISODE OF MAJOR DEPRESSIVE DISORDER WITHOUT PRIOR EPISODE (HCC): ICD-10-CM

## 2023-07-31 PROCEDURE — 99205 OFFICE O/P NEW HI 60 MIN: CPT | Performed by: PSYCHIATRY & NEUROLOGY

## 2023-07-31 RX ORDER — CARBIDOPA/LEVODOPA 25MG-250MG
1 TABLET ORAL 4 TIMES DAILY
Qty: 360 TABLET | Refills: 2 | Status: SHIPPED | OUTPATIENT
Start: 2023-07-31

## 2023-07-31 RX ORDER — CARBIDOPA 25 MG/1
25 TABLET ORAL 4 TIMES DAILY
Qty: 120 TABLET | Refills: 5 | Status: SHIPPED | OUTPATIENT
Start: 2023-07-31

## 2023-07-31 RX ORDER — ENTACAPONE 200 MG/1
200 TABLET ORAL 4 TIMES DAILY
Qty: 120 TABLET | Refills: 8 | Status: SHIPPED | OUTPATIENT
Start: 2023-07-31

## 2023-07-31 NOTE — PROGRESS NOTES
Tiara Krishna is a 40year old with hypertension, hypertriglyceridemia, osteoarthritis, and Parkinson's disease who presents for movement disorder evaluation to establish care. Review of records reveals that he had onset Parkinson disease was diagnosed around March 2011 when seen at [facility] by Dr. Renita Esquivel. Prior to this he had a 5-year history of unexplained neurological symptoms including paresthesias of all 4 limbs. He was followed at [facility] where work-up for Lyme disease including lumbar puncture was unremarkable. He tried various medications for neuropathic pain including  gabapentin, Lyrica, Flexeril, and Cymbalta. Symptoms progressed to include fatigue, muscle aches, left shoulder pain, and a hand tremor. Trials of primidone and topiramate were ineffective. By 2011 he developed a reduction in left arm swing. Rasagiline started in 2011 with improvement in fatigue, stiffness and pain. He under the care of Dr. Lelo Hadley in [place] since 2014. Last visit was May 2022. There is a family history of young onset Parkinson's disease which includes his paternal great grandmother of [place] descent who was diagnosed at age 27 and left her 80s, and her son his paternal uncle. Previously worked as a . Neuropsych testing in 2017 in suggestive of mild cognitive impairment as related to PD. Review of history with patient: Reports symptoms over over 10 years with slowed movements. Had malar rah at onset nad Lupus ruled out. He presents with his aunt who helped with history. In recent months he has increased fatigue. He can do 1-2 hours of yard work and then will take days to recover. Rest and action tremor have increased. This affects writing and using utensils. He has difficulty with fine motor movements. He has stumble but there have been no falls or injuries. Over the past year he has developed nausea about 45 minutes into a dose whch last an hour.  Medication adherence is an issue. He and aunt have not noted cognitive changes. Mood has been stable. No clear improvement on Welbutrin. Sleep maintenance is an issue. Atarax helps him fall asleep. He can have difficulty falling asleep with pain arthritis on some nights. He tried changing his mattress. Speech is    There is no drooling. Mild coughing with medications only in the am.   There are no issues with urination or constipation. There is occasional lightheadedness on standing. He does not drink adequate liquids. Cognition is unchanged. Finances are managed by the patient. There is no difficulty with household tasks.         Current PD related medications:  Carbidopa/levodopa 25/250 grams 1 tablet 4 times daily (8am, 12pm, 4pm and 9pm)  Entacapone 200 mg 1 tablet 4 times daily  Baclofen 10 mg 3 times daily  For depression:  Bupropion 75 mg twice daily  Hydroxyzine 50 mg nightly    Prior medication trials:  Rasagiline  Amantadine-no improvement  Trihexyphenidyl-ineffective  Pramipexole-show improvement, but associated with sedation while driving  Neupro patch  Provigil for daytime sedation  Stalevo  Melatonin  Ropinirole XL-discontinued due to cost    .ndu

## 2023-07-31 NOTE — ASSESSMENT & PLAN NOTE
Asymmetric bradykinesia, when action tremor, reduced arm swing, which are reported to be responsive to levodopa consistent with his diagnosis of young onset Parkinson's disease. In reviewing chart it does appear that he had a negative DaTscan in 2013. His symptoms however have progressed over the years and had showed response to levodopa as seen with Parkinson's disease. He has never had genetic testing. Main concern today is increasing fatigue, poor sleep, and more recent development of nausea with every dose of carbidopa/levodopa. We discussed the potential option of adding extra carbidopa to each dose of carbidopa levodopa in an attempt to reduce nausea. He is interested in this option. If this is effective we could consider tapering it off in the future her symptoms have been stable for a while. Etiology of sleep disturbance is unclear. In part can be related pain. It was suspected he had depression, although the patient denies. He has not noted any improvement or worsening since on the Wellbutrin and wishes to consider tapering off. He is also willing to taper off of Atarax. In discussion it does appear that it may be beneficial to obtain some background information prior to making decisions with regards to his mood medications. I will therefore contact his wife Steven Alex on Thursday a.m. If in agreement we can taper off Wellbutrin and Atarax. We will then plan on a trial of mirtazapine or trazodone.     wife - thurs am

## 2023-07-31 NOTE — PROGRESS NOTES
Patient ID: Johnathon Granado. is a 40 y.o. male    Assessment/Plan:    Parkinson's disease ([address]) - early onset  Asymmetric bradykinesia, when action tremor, reduced arm swing, which are reported to be responsive to levodopa consistent with his diagnosis of young onset Parkinson's disease. In reviewing chart it does appear that he had a negative DaTscan in 2013. His symptoms however have progressed over the years and had showed response to levodopa as seen with Parkinson's disease. He has never had genetic testing. Main concern today is increasing fatigue, poor sleep, and more recent development of nausea with every dose of carbidopa/levodopa. We discussed the potential option of adding extra carbidopa to each dose of carbidopa levodopa in an attempt to reduce nausea. He is interested in this option. If this is effective we could consider tapering it off in the future her symptoms have been stable for a while. Etiology of sleep disturbance is unclear. In part can be related pain. It was suspected he had depression, although the patient denies. He has not noted any improvement or worsening since on the Wellbutrin and wishes to consider tapering off. He is also willing to taper off of Atarax. In discussion it does appear that it may be beneficial to obtain some background information prior to making decisions with regards to his mood medications. I will therefore contact his wife Melanie Marcus on Thursday a.m. If in agreement we can taper off Wellbutrin and Atarax. We will then plan on a trial of mirtazapine or trazodone. wife - thurs am       Diagnoses and all orders for this visit:    Parkinson's disease ([address]) - early onset  -     entacapone (COMTAN) 200 mg tablet; Take 1 tablet (200 mg total) by mouth 4 (four) times a day  -     carbidopa-levodopa (SINEMET)  mg per tablet; Take 1 tablet by mouth 4 (four) times a day  -     carbidopa (LODOSYN) 25 MG tablet;  Take 1 tablet by mouth 4 (four) times a day Take with each dose of carbidopa/levodopa    Chronic fatigue    Current mild episode of major depressive disorder without prior episode ([address])    I have spent a total time of 70 minutes on 07/31/23 in caring for this patient including Prognosis, Risks and benefits of tx options, Instructions for management, Patient and family education, Importance of tx compliance, Impressions, Counseling / Coordination of care and Documenting in the medical record, review of prior records from previous neurologists. Subjective:      Alisia Mcclellan is a 40year old with hypertension, hypertriglyceridemia, osteoarthritis, and Parkinson's disease who presents for movement disorder evaluation to establish care. Review of records reveals that he had onset Parkinson disease was diagnosed around March 2011 when seen at [facility] by Dr. Chad Feldman. Prior to this he had a 5-year history of unexplained neurological symptoms including paresthesias of all 4 limbs. He was followed at [facility] where work-up for Lyme disease including lumbar puncture was unremarkable. He tried various medications for neuropathic pain including  gabapentin, Lyrica, Flexeril, and Cymbalta. Symptoms progressed to include fatigue, muscle aches, left shoulder pain, and a hand tremor. Trials of primidone and topiramate were ineffective. By 2011 he developed a reduction in left arm swing. Rasagiline started in 2011 with improvement in fatigue, stiffness and pain. He under the care of Dr. Leandro Sauer in [place] since 2014. Last visit was May 2022. There is a family history of young onset Parkinson's disease which includes his paternal great grandmother of [place] descent who was diagnosed at age 27 and left her 80s, and her son his paternal uncle. Previously worked as a . Neuropsych testing in 2017 in suggestive of mild cognitive impairment as related to PD.     Review of history with patient: Reports symptoms over over 10 years with slowed movements. Onset he was also noted to have a perioral Maller rash and lupus was ruled out. He presents with his aunt who helped with history. In recent months he has increased fatigue. He can do 1-2 hours of yard work and then will take days to recover. Rest and action tremor have increased. This affects writing and using utensils. He has difficulty with fine motor movements. He has stumble but there have been no falls or injuries. Over the past year he has developed nausea about 45 minutes into a dose whch last an hour. Medication adherence is an issue. He and aunt have not noted cognitive changes. Mood has been stable. No clear improvement on Welbutrin. Sleep maintenance is an issue. Atarax helps him fall asleep. He can have difficulty falling asleep with pain arthritis on some nights. He tried changing his mattress. Lives with his wife and 2 children. Mild coughing with medications only in the am.    There is occasional lightheadedness on standing. He does not drink adequate liquids. Current PD related medications:  Carbidopa/levodopa 25/250 grams 1 tablet 4 times daily (8am, 12pm, 4pm and 9pm)  Entacapone 200 mg 1 tablet 4 times daily  Baclofen 10 mg 3 times daily  For depression:  Bupropion 75 mg twice daily  Hydroxyzine 50 mg nightly    Prior medication trials:  Rasagiline  Amantadine-no improvement  Trihexyphenidyl-ineffective  Pramipexole-show improvement, but associated with sedation while driving  Neupro patch  Provigil for daytime sedation  Stalevo  Melatonin  Ropinirole XL-discontinued due to cost        Objective:    /78 (BP Location: Right arm, Patient Position: Standing, Cuff Size: Standard)   Pulse 87   Temp (!) 97.4 °F (36.3 °C)   Wt 83.1 kg (183 lb 1.6 oz)   BMI 26.27 kg/m²       Physical Exam  Vitals reviewed. Eyes:      Extraocular Movements: Extraocular movements intact. Pupils: Pupils are equal, round, and reactive to light. Neurological:      Mental Status: He is alert. Motor: Motor strength is normal.     Deep Tendon Reflexes:      Reflex Scores:       Tricep reflexes are 2+ on the right side and 2+ on the left side. Bicep reflexes are 2+ on the right side and 2+ on the left side. Patellar reflexes are 1+ on the right side and 1+ on the left side. Neurological Exam  Mental Status  Alert. Oriented to person, place, time and situation. Speech: hypophonia. Language is fluent with no aphasia. Attention and concentration are normal.    Cranial Nerves  CN III, IV, VI: Extraocular movements intact bilaterally. Pupils equal round and reactive to light bilaterally. CN VII: Full and symmetric facial movement. CN VIII: Hearing is normal.  CN IX, X: Palate elevates symmetrically  CN XI: Shoulder shrug strength is normal.  CN XII: Tongue midline without atrophy or fasciculations. Motor   Normal muscle tone. Strength is 5/5 throughout all four extremities. Sensory  Light touch is normal in upper and lower extremities. Vibration is normal in upper and lower extremities. Reflexes                                            Right                      Left  Biceps                                 2+                         2+  Triceps                                2+                         2+  Patellar                                1+                         1+    Coordination    See motor UPDRS    Moderate amplitude right postural tremor 2  Left postural 1  Moderate amplitude action tremor on FTN   No rest tremor in office . Gait  Casual gait: Able to rise from chair without using arms. Reduced left arm swing  .        MDS UPDRS III                                       Time since last dose:    Speech  1   Facial Expression  1   Rigidity - Neck  0   Rigidity - Upper Extremity (R)  0   Rigidity - Upper Extremity (L)   1   Rigidity - Lower Extremity (R)  0   Rigidity - Lower Extremity (L)   0   Finger Taps (R) 2   Finger Taps (L)   2   Hand Movement (R)  0   Hand Movement (L)   1   Pronation/Supination (R)  1   Pronation/Supination (L)   2   Toe Tapping (R) 1   Toe Tapping (L) 2   Leg Agility (R)  0   Leg Agility (L)   0   Arising from Chair   0   Gait   1    Freezing of Gait 0   Postural Stability   0   Posture 0   Global spontaneity of movement 0   Postural Tremor (Ri 2   Postural Tremor (L) 1   Kinetic Tremor (R)  2   Kinetic Tremor (L)  2   Rest tremor amplitude RUE 0   Rest tremor amplitude LUE 0   Rest tremor amplitude RLE 0   Reset tremor amplitude LLE 0   Lip/Jaw Tremor  0   Consistency of tremor 0   Motor Exam Total:                   Whit Newsome MD  Movement disorder physician  [address] [facility]

## 2023-07-31 NOTE — PATIENT INSTRUCTIONS
Continue on carbidopa/levodopa and entacapone 4 times daily  Add extra carbidopa to each dose to see if this improve nausea. Will contact your wife to get some more information prior to considering an change in antidepressant. Plan would be to taper off Welbutrin and atarax and add mirtazepine.

## 2023-07-31 NOTE — PROGRESS NOTES
Nany Davis is a 39 yo male here for transfer of care for PD. HPI by patient and Aunt       10 years ago; fatigue was first sx, stiff shoulder  R/o lupus, other causes - diagnosed with parkinson's     Increased fatigue over the past several months; used to work most of the morning, but now can do 1-2 hours of activity and then needs to rest for a few days     About the same.  A little worse; more stumbling, tremors, trying to write and fine motor movements, utensils  Baseline resting tremors have increased; crosses arms to control      Last couple months    Tripping, wall catches him; no falls no head strikes     Medicine makes nauseous    Since last year; parkinsons meds     Memory about the same; short term memory loss at baseline - no changes per pt but maybe for wife; aunt endorses slight memory decline     Mood about the same since taking wellbutrin; aunt cannot attest to mood changes    PMH arthritis hip and back   Knee pain left knee, back -   Kidney stones: 3 months ago, found on incidental scan  Hypertension, high cholesterol     Sees PCP regularly     Water intake: not enough hydration;     Social hx:     Lives with wife and kids;     Vape, quit smoking 2 yrs ago,     Social alcohol (1-2 drinks a week) - helps with the tremor    On disability    Trouble swallowing in the morning - wake up and take meds, cough/gag meds after coffee - resolves when he takes medicine     Dizziness when standing up too fast;     Sleep: up multiple times a night (atarax helps a bit) - avg is 5-6 per night; not restful   Cant go back to sleep if he wakes up - more than 3    Spontaneous, pain sometimes -hip, back, pain, knees, shoulder

## 2023-07-31 NOTE — PROGRESS NOTES
Review of Systems   Constitutional: Positive for fatigue. Negative for appetite change and fever. HENT: Positive for trouble swallowing (In the AM after waking up). Negative for hearing loss, tinnitus and voice change. Eyes: Negative. Negative for photophobia, pain and visual disturbance. Respiratory: Negative. Negative for shortness of breath. Cardiovascular: Negative. Negative for palpitations. Gastrointestinal: Positive for nausea. Negative for vomiting. Endocrine: Negative. Negative for cold intolerance. Genitourinary: Negative. Negative for dysuria, frequency and urgency. Musculoskeletal: Positive for gait problem (Stumbles), myalgias (Mostly in the legs) and neck stiffness (Some). Negative for back pain and neck pain. Balance Issues at times     Skin: Negative. Negative for rash. Allergic/Immunologic: Negative. Neurological: Positive for dizziness (Only when standing up), tremors (Hands   and sometimes in Legs as well) and speech difficulty (Mumbles). Negative for seizures, syncope, facial asymmetry, weakness, light-headedness, numbness and headaches. Hematological: Negative. Does not bruise/bleed easily. Psychiatric/Behavioral: Positive for sleep disturbance. Negative for confusion and hallucinations. All other systems reviewed and are negative.

## 2023-08-02 ENCOUNTER — TELEPHONE (OUTPATIENT)
Dept: NEUROLOGY | Facility: CLINIC | Age: 43
End: 2023-08-02

## 2023-08-02 NOTE — TELEPHONE ENCOUNTER
I'm calling from the [address] [place] [address] in [place] in reference to a script that we have here for a mutual patient. Patient's name is Roxie Varner. Date of birth is [birth year]. I am following up on the per authorization request for his carbidopa  25 milligram tablet. It looks like the information was faxed over on 7/31. So just looking for a follow up on whether or not the prior authorization will  be completed. If you have any questions, please give us a call back [phone].  Thank you

## 2023-08-08 NOTE — TELEPHONE ENCOUNTER
Attempted to submit PA via CMM but unable to retrieve clinical questions. [address] for phone #  [phone]  ED [phone]  [address] [place] [address]    I called insurance to submit a verbal PA and was told carbidopa is not covered by plan, nor is  Prior auth option offerred for coverage  alternatives:  Carbidopa/levodopa    Suggested I call for an exception for benefit coverage review however was on the phone greater that 30 minutes with member service reps and was told there is no benefit exception department. I checked good rx and monthly cost is between $45.46 - $393.90 depending on pharmacy. Please provide recommendation, thank you.

## 2023-08-09 NOTE — TELEPHONE ENCOUNTER
Bev Cardona MD  You 8 minutes ago (10:17 AM)       Would patient be able to afford carbidopa with [employer] prices? If so we could try for a couple of month to see if this allows his body to get use to it and then try tapering off. Has he tried charmaine tea or taking medication  doses with carbohydrate snack     Patient aware and will try the tea and snack first. If not effective will cb.

## 2023-08-16 DIAGNOSIS — E78.2 MIXED HYPERLIPIDEMIA: ICD-10-CM

## 2023-08-16 RX ORDER — OMEGA-3-ACID ETHYL ESTERS 1 G/1
CAPSULE, LIQUID FILLED ORAL
Qty: 120 CAPSULE | Refills: 0 | Status: SHIPPED | OUTPATIENT
Start: 2023-08-16

## 2023-08-25 DIAGNOSIS — F32.0 CURRENT MILD EPISODE OF MAJOR DEPRESSIVE DISORDER WITHOUT PRIOR EPISODE (HCC): ICD-10-CM

## 2023-08-25 RX ORDER — HYDROXYZINE 50 MG/1
50 TABLET, FILM COATED ORAL EVERY EVENING
Qty: 30 TABLET | Refills: 0 | Status: SHIPPED | OUTPATIENT
Start: 2023-08-25

## 2023-09-17 DIAGNOSIS — E78.2 MIXED HYPERLIPIDEMIA: ICD-10-CM

## 2023-09-18 RX ORDER — OMEGA-3-ACID ETHYL ESTERS 1 G/1
CAPSULE, LIQUID FILLED ORAL
Qty: 120 CAPSULE | Refills: 0 | Status: SHIPPED | OUTPATIENT
Start: 2023-09-18

## 2023-09-20 DIAGNOSIS — F32.0 CURRENT MILD EPISODE OF MAJOR DEPRESSIVE DISORDER WITHOUT PRIOR EPISODE (HCC): ICD-10-CM

## 2023-09-20 RX ORDER — HYDROXYZINE 50 MG/1
50 TABLET, FILM COATED ORAL EVERY EVENING
Qty: 30 TABLET | Refills: 0 | Status: SHIPPED | OUTPATIENT
Start: 2023-09-20

## 2023-09-28 DIAGNOSIS — M16.12 OSTEOARTHRITIS OF LEFT HIP, UNSPECIFIED OSTEOARTHRITIS TYPE: ICD-10-CM

## 2023-09-29 RX ORDER — MELOXICAM 15 MG/1
15 TABLET ORAL DAILY
Qty: 30 TABLET | Refills: 0 | Status: SHIPPED | OUTPATIENT
Start: 2023-09-29

## 2023-10-18 DIAGNOSIS — E78.2 MIXED HYPERLIPIDEMIA: ICD-10-CM

## 2023-10-18 RX ORDER — OMEGA-3-ACID ETHYL ESTERS 1 G/1
2 CAPSULE, LIQUID FILLED ORAL 2 TIMES DAILY
Qty: 120 CAPSULE | Refills: 0 | Status: SHIPPED | OUTPATIENT
Start: 2023-10-18

## 2023-10-20 DIAGNOSIS — F32.0 CURRENT MILD EPISODE OF MAJOR DEPRESSIVE DISORDER WITHOUT PRIOR EPISODE (HCC): ICD-10-CM

## 2023-10-20 RX ORDER — HYDROXYZINE 50 MG/1
50 TABLET, FILM COATED ORAL EVERY EVENING
Qty: 30 TABLET | Refills: 0 | Status: SHIPPED | OUTPATIENT
Start: 2023-10-20

## 2023-11-09 ENCOUNTER — OFFICE VISIT (OUTPATIENT)
Dept: FAMILY MEDICINE CLINIC | Facility: CLINIC | Age: 43
End: 2023-11-09
Payer: COMMERCIAL

## 2023-11-09 VITALS
OXYGEN SATURATION: 97 % | TEMPERATURE: 97.6 F | WEIGHT: 181.6 LBS | SYSTOLIC BLOOD PRESSURE: 130 MMHG | BODY MASS INDEX: 26 KG/M2 | HEART RATE: 93 BPM | HEIGHT: 70 IN | DIASTOLIC BLOOD PRESSURE: 80 MMHG

## 2023-11-09 DIAGNOSIS — M16.12 OSTEOARTHRITIS OF LEFT HIP, UNSPECIFIED OSTEOARTHRITIS TYPE: ICD-10-CM

## 2023-11-09 DIAGNOSIS — J84.10 CALCIFIED GRANULOMA OF LUNG (HCC): Primary | ICD-10-CM

## 2023-11-09 DIAGNOSIS — S00.31XA ABRASION OF NOSE WITH INFECTION, INITIAL ENCOUNTER: ICD-10-CM

## 2023-11-09 DIAGNOSIS — I10 PRIMARY HYPERTENSION: ICD-10-CM

## 2023-11-09 DIAGNOSIS — L08.9 ABRASION OF NOSE WITH INFECTION, INITIAL ENCOUNTER: ICD-10-CM

## 2023-11-09 PROBLEM — J98.4 CALCIFIED GRANULOMA OF LUNG: Status: ACTIVE | Noted: 2023-11-09

## 2023-11-09 PROCEDURE — 99214 OFFICE O/P EST MOD 30 MIN: CPT | Performed by: NURSE PRACTITIONER

## 2023-11-09 RX ORDER — MELOXICAM 15 MG/1
15 TABLET ORAL DAILY
Qty: 30 TABLET | Refills: 0 | Status: SHIPPED | OUTPATIENT
Start: 2023-11-09

## 2023-11-09 NOTE — PROGRESS NOTES
Name: Jesus Lloyd. : 1980      MRN: [id number]  Encounter Provider: LESLIE Orona  Encounter Date: 2023   Encounter department: [address] [place] [address]     1. Calcified granuloma of lung [facility])  Assessment & Plan:  CT scan from July reviewed with patient. Questions answered. Patient currently denies chest pain, shortness of breath. Discussed with patient importance of ceasing use of vape. Advised to return for worsening/concerning symptoms as discussed. 2. Abrasion of nose with infection, initial encounter  Comments:  Advised moist compress, avoid irritation, Bactroban ointment as prescribed. Orders:  -     mupirocin (BACTROBAN) 2 % ointment; Apply topically 3 (three) times a day    3. Osteoarthritis of left hip, unspecified osteoarthritis type  -     meloxicam (Mobic) 15 mg tablet; Take 1 tablet (15 mg total) by mouth daily    4. Primary hypertension  Assessment & Plan:  Blood pressure controlled in office today. To continue on current dose of lisinopril as prescribed. Subjective      Patient presents office for 3-month follow-up. Since last visit, patient established with movement specialist/neurology. Notes compliance with daily medications. Patient had CT scan in July, results reviewed with patient. Patient currently denies any complaints related to chronic issues. Patient notes abrasion to inside of nasal passage. As per patient, observed abrasion to left knee years few weeks ago. Patient notes increased irritation, attempted to use antibiotic ointment, but was not effective. Patient denies fever, chills, severe pain. Notes area will bleed when irritated. Review of Systems   Constitutional:  Negative for activity change, appetite change, chills and fever. HENT:  Negative for congestion, ear pain, postnasal drip, rhinorrhea, sinus pressure and sinus pain.     Respiratory:  Negative for cough, chest tightness and shortness of breath. Cardiovascular:  Negative for chest pain and palpitations. Gastrointestinal:  Negative for abdominal pain, nausea and vomiting. Genitourinary:  Negative for decreased urine volume. Musculoskeletal:  Negative for arthralgias and myalgias. Skin:  Positive for wound. Negative for color change and rash. Neurological:  Negative for dizziness, seizures, light-headedness and headaches. Hematological:  Negative for adenopathy. Psychiatric/Behavioral:  Negative for confusion. Current Outpatient Medications on File Prior to Visit   Medication Sig    baclofen 10 mg tablet Take 1 tablet (10 mg total) by mouth 3 (three) times a day (Patient taking differently: Take 10 mg by mouth if needed)    buPROPion (WELLBUTRIN) 75 mg tablet Take 1 tablet (75 mg total) by mouth 2 (two) times a day    carbidopa (LODOSYN) 25 MG tablet Take 1 tablet by mouth 4 (four) times a day Take with each dose of carbidopa/levodopa    carbidopa-levodopa (SINEMET)  mg per tablet Take 1 tablet by mouth 4 (four) times a day    cholecalciferol (VITAMIN D3) 1,000 units tablet Take 1 tablet (1,000 Units total) by mouth daily    entacapone (COMTAN) 200 mg tablet Take 1 tablet (200 mg total) by mouth 4 (four) times a day    hydrOXYzine HCL (ATARAX) 50 mg tablet Take 1 tablet (50 mg total) by mouth every evening    lisinopril (ZESTRIL) 20 mg tablet Take 1 tablet (20 mg total) by mouth daily    omega-3-acid ethyl esters (LOVAZA) 1 g capsule Take 2 capsules (2 g total) by mouth 2 (two) times a day    rosuvastatin (CRESTOR) 10 MG tablet Take 1 tablet (10 mg total) by mouth daily    [DISCONTINUED] meloxicam (Mobic) 15 mg tablet Take 1 tablet (15 mg total) by mouth daily       Objective     /80   Pulse 93   Temp 97.6 °F (36.4 °C)   Ht 5' 10" (1.778 m)   Wt 82.4 kg (181 lb 9.6 oz)   SpO2 97%   BMI 26.06 kg/m²     Physical Exam  Vitals reviewed.    Constitutional:       Appearance: Normal appearance. HENT:      Head: Normocephalic and atraumatic. Right Ear: Tympanic membrane, ear canal and external ear normal.      Left Ear: Tympanic membrane, ear canal and external ear normal.      Nose: Rhinorrhea present. Comments: Abrasion/skin breakdown noted to medial aspect of left nares     Mouth/Throat:      Mouth: Mucous membranes are moist.      Pharynx: Oropharynx is clear. Eyes:      Conjunctiva/sclera: Conjunctivae normal.      Pupils: Pupils are equal, round, and reactive to light. Cardiovascular:      Rate and Rhythm: Normal rate and regular rhythm. Pulses: Normal pulses. Heart sounds: Normal heart sounds. No murmur heard. Pulmonary:      Effort: Pulmonary effort is normal.      Breath sounds: Normal breath sounds. No wheezing. Abdominal:      General: Bowel sounds are normal.      Palpations: Abdomen is soft. Tenderness: There is no abdominal tenderness. Musculoskeletal:         General: Normal range of motion. Cervical back: Normal range of motion and neck supple. Skin:     General: Skin is warm and dry. Neurological:      General: No focal deficit present. Mental Status: He is alert and oriented to person, place, and time.    Psychiatric:         Mood and Affect: Mood normal.         Behavior: Behavior normal.       LESLIE Thomas

## 2023-11-09 NOTE — ASSESSMENT & PLAN NOTE
CT scan from July reviewed with patient. Questions answered. Patient currently denies chest pain, shortness of breath. Discussed with patient importance of ceasing use of vape. Advised to return for worsening/concerning symptoms as discussed.

## 2023-11-13 DIAGNOSIS — E55.9 VITAMIN D INSUFFICIENCY: ICD-10-CM

## 2023-11-13 RX ORDER — MELATONIN
1000 DAILY
Qty: 90 TABLET | Refills: 0 | Status: SHIPPED | OUTPATIENT
Start: 2023-11-13

## 2023-11-20 DIAGNOSIS — E78.2 MIXED HYPERLIPIDEMIA: ICD-10-CM

## 2023-11-20 DIAGNOSIS — E78.2 MIXED HYPERLIPIDEMIA: Primary | ICD-10-CM

## 2023-11-20 DIAGNOSIS — I10 PRIMARY HYPERTENSION: ICD-10-CM

## 2023-11-20 DIAGNOSIS — F32.0 CURRENT MILD EPISODE OF MAJOR DEPRESSIVE DISORDER WITHOUT PRIOR EPISODE (HCC): ICD-10-CM

## 2023-11-20 RX ORDER — HYDROXYZINE 50 MG/1
50 TABLET, FILM COATED ORAL EVERY EVENING
Qty: 30 TABLET | Refills: 0 | Status: SHIPPED | OUTPATIENT
Start: 2023-11-20

## 2023-11-20 RX ORDER — LISINOPRIL 20 MG/1
20 TABLET ORAL DAILY
Qty: 30 TABLET | Refills: 0 | Status: SHIPPED | OUTPATIENT
Start: 2023-11-20 | End: 2023-11-28 | Stop reason: SDUPTHER

## 2023-11-20 RX ORDER — OMEGA-3-ACID ETHYL ESTERS 1 G/1
2 CAPSULE, LIQUID FILLED ORAL 2 TIMES DAILY
Qty: 120 CAPSULE | Refills: 0 | Status: SHIPPED | OUTPATIENT
Start: 2023-11-20 | End: 2023-11-28 | Stop reason: SDUPTHER

## 2023-11-20 RX ORDER — ROSUVASTATIN CALCIUM 10 MG/1
10 TABLET, COATED ORAL DAILY
Qty: 30 TABLET | Refills: 0 | Status: SHIPPED | OUTPATIENT
Start: 2023-11-20 | End: 2023-11-28 | Stop reason: SDUPTHER

## 2023-11-21 DIAGNOSIS — F32.0 CURRENT MILD EPISODE OF MAJOR DEPRESSIVE DISORDER WITHOUT PRIOR EPISODE (HCC): ICD-10-CM

## 2023-11-21 RX ORDER — BUPROPION HYDROCHLORIDE 75 MG/1
75 TABLET ORAL 2 TIMES DAILY
Qty: 60 TABLET | Refills: 0 | Status: SHIPPED | OUTPATIENT
Start: 2023-11-21

## 2023-11-22 DIAGNOSIS — G20.A1 PARKINSON'S DISEASE: ICD-10-CM

## 2023-11-27 NOTE — TELEPHONE ENCOUNTER
Patient called again for his medication for Parkinsons. Informed him waiting for Dr Johnathon Garcia to sign the script.

## 2023-11-28 DIAGNOSIS — E78.2 MIXED HYPERLIPIDEMIA: ICD-10-CM

## 2023-11-28 DIAGNOSIS — I10 PRIMARY HYPERTENSION: ICD-10-CM

## 2023-11-28 RX ORDER — LISINOPRIL 20 MG/1
20 TABLET ORAL DAILY
Qty: 90 TABLET | Refills: 1 | Status: SHIPPED | OUTPATIENT
Start: 2023-11-28

## 2023-11-28 RX ORDER — OMEGA-3-ACID ETHYL ESTERS 1 G/1
2 CAPSULE, LIQUID FILLED ORAL 2 TIMES DAILY
Qty: 360 CAPSULE | Refills: 1 | Status: SHIPPED | OUTPATIENT
Start: 2023-11-28

## 2023-11-28 RX ORDER — CARBIDOPA/LEVODOPA 25MG-250MG
1 TABLET ORAL 4 TIMES DAILY
Qty: 360 TABLET | Refills: 1 | Status: SHIPPED | OUTPATIENT
Start: 2023-11-28

## 2023-11-28 RX ORDER — ROSUVASTATIN CALCIUM 10 MG/1
10 TABLET, COATED ORAL DAILY
Qty: 90 TABLET | Refills: 1 | Status: SHIPPED | OUTPATIENT
Start: 2023-11-28

## 2023-11-28 NOTE — TELEPHONE ENCOUNTER
Sending refill request per [employer] in [place] as they have sent us a prescription change request forms for the patient's rosuvastatin, lisinopril, and omega-3 ethyl esters medication to be all changed from 30 day supplies to 90 day supplies. Am sending message to you as a rx refill request. Are we able to change the supply to 90? Thank you

## 2023-12-12 DIAGNOSIS — G20.A1 PARKINSON'S DISEASE: ICD-10-CM

## 2023-12-12 DIAGNOSIS — M16.12 OSTEOARTHRITIS OF LEFT HIP, UNSPECIFIED OSTEOARTHRITIS TYPE: ICD-10-CM

## 2023-12-12 RX ORDER — MELOXICAM 15 MG/1
15 TABLET ORAL DAILY
Qty: 30 TABLET | Refills: 0 | Status: SHIPPED | OUTPATIENT
Start: 2023-12-12

## 2023-12-12 RX ORDER — ENTACAPONE 200 MG/1
200 TABLET ORAL 4 TIMES DAILY
Qty: 120 TABLET | Refills: 5 | Status: SHIPPED | OUTPATIENT
Start: 2023-12-12

## 2023-12-12 NOTE — TELEPHONE ENCOUNTER
Pt is requesting script for Comtan. Last OV was 7/31/23.  Routed to provider to review the refill request.

## 2023-12-15 DIAGNOSIS — F32.0 CURRENT MILD EPISODE OF MAJOR DEPRESSIVE DISORDER WITHOUT PRIOR EPISODE (HCC): ICD-10-CM

## 2023-12-15 RX ORDER — BUPROPION HYDROCHLORIDE 75 MG/1
75 TABLET ORAL 2 TIMES DAILY
Qty: 60 TABLET | Refills: 0 | Status: SHIPPED | OUTPATIENT
Start: 2023-12-15

## 2023-12-18 DIAGNOSIS — F32.0 CURRENT MILD EPISODE OF MAJOR DEPRESSIVE DISORDER WITHOUT PRIOR EPISODE (HCC): ICD-10-CM

## 2023-12-18 RX ORDER — HYDROXYZINE 50 MG/1
50 TABLET, FILM COATED ORAL EVERY EVENING
Qty: 30 TABLET | Refills: 0 | Status: SHIPPED | OUTPATIENT
Start: 2023-12-18

## 2023-12-20 ENCOUNTER — OFFICE VISIT (OUTPATIENT)
Dept: NEUROLOGY | Facility: CLINIC | Age: 43
End: 2023-12-20
Payer: COMMERCIAL

## 2023-12-20 VITALS — HEART RATE: 98 BPM | DIASTOLIC BLOOD PRESSURE: 80 MMHG | SYSTOLIC BLOOD PRESSURE: 139 MMHG

## 2023-12-20 DIAGNOSIS — F32.0 CURRENT MILD EPISODE OF MAJOR DEPRESSIVE DISORDER WITHOUT PRIOR EPISODE (HCC): ICD-10-CM

## 2023-12-20 DIAGNOSIS — R20.2 RIGHT HAND PARESTHESIA: ICD-10-CM

## 2023-12-20 DIAGNOSIS — G47.9 SLEEP DISTURBANCE: ICD-10-CM

## 2023-12-20 DIAGNOSIS — G20.A1 PARKINSON'S DISEASE WITHOUT DYSKINESIA OR FLUCTUATING MANIFESTATIONS: Primary | Chronic | ICD-10-CM

## 2023-12-20 PROCEDURE — 99214 OFFICE O/P EST MOD 30 MIN: CPT | Performed by: PSYCHIATRY & NEUROLOGY

## 2023-12-20 RX ORDER — MIRTAZAPINE 15 MG/1
15 TABLET, FILM COATED ORAL
Qty: 30 TABLET | Refills: 5 | Status: SHIPPED | OUTPATIENT
Start: 2024-01-03

## 2023-12-20 NOTE — PATIENT INSTRUCTIONS
Will try tapering off Wellbutrin. Take 75mg once daily for 2 weeks then stop.    Then start mirtazapine 15mg nightly     Plan would then be to taper off Atarax after a couple of weeks.     Right hand symptoms suggestive if ulnar neuropathy. Will monitor for worsening and consider EMG if this occurs.

## 2023-12-20 NOTE — ASSESSMENT & PLAN NOTE
Asymmetric bradykinesia, when action tremor, reduced arm swing, which are reported to be responsive to levodopa consistent with his diagnosis of young onset Parkinson's disease. Main concern today is increasing fatigue, poor sleep, and more recent development of nausea with every dose of carbidopa/levodopa.  He continues to have nausea with each dose of carbidopa/levodopa.  He was unable to obtain extra carbidopa given cost and availability.  Discussed taking each dose of medication with low protein food.  Will continue on current dosing of 25 over 251 tablet 4 times daily along with entacapone.

## 2023-12-20 NOTE — PROGRESS NOTES
Patient ID: Ralph Broadbent Jr. is a 43 y.o. male.    Assessment/Plan:    Right hand paresthesia  History and exam suggestive of mild ulnar neuropathy in the right hand.  Discussed the option of obtaining an EMG.  At this time he wishes to defer.  I discussed reasons to prevent worsening by avoidance of any pressure on the nerve at the elbow.    Parkinson's disease  Asymmetric bradykinesia, when action tremor, reduced arm swing, which are reported to be responsive to levodopa consistent with his diagnosis of young onset Parkinson's disease. Main concern today is increasing fatigue, poor sleep, and more recent development of nausea with every dose of carbidopa/levodopa.  He continues to have nausea with each dose of carbidopa/levodopa.  He was unable to obtain extra carbidopa given cost and availability.  Discussed taking each dose of medication with low protein food.  Will continue on current dosing of 25 over 251 tablet 4 times daily along with entacapone.    Sleep disturbance  Difficulty initiating maintaining sleep.  Overall has increased fatigue during the day.  He has been on Wellbutrin at for depression.  He is wife is present with him today who states agrees with the patient that  this was never helpful in improving mood.  After discussion we opted to taper off Wellbutrin by having him take 75 mg daily for 2 weeks.  If tolerating then we can start her mirtazapine 15 mg nightly to see if this may help with mood and sleep.  Plans will then be to taper off Atarax.       Diagnoses and all orders for this visit:    Parkinson's disease without dyskinesia or fluctuating manifestations    Current mild episode of major depressive disorder without prior episode (HCC)  -     mirtazapine (REMERON) 15 mg tablet; Take 1 tablet (15 mg total) by mouth daily at bedtime Do not start before January 3, 2024.    Right hand paresthesia    Sleep disturbance       Subjective:    Micah Amaya is a 42 year old with hypertension,  hypertriglyceridemia, osteoarthritis, and Parkinson's disease who presents for movement disorder evaluation to establish care.    Review of records reveals that he had onset Parkinson disease was diagnosed around March 2011 when seen at [facility] by Dr. Mcfadden.  Prior to this he had a 5-year history of unexplained neurological symptoms including paresthesias of all 4 limbs.  He was followed at [facility] where work-up for Lyme disease including lumbar puncture was unremarkable.  He tried various medications for neuropathic pain including  gabapentin, Lyrica, Flexeril, and Cymbalta.  Symptoms progressed to include fatigue, muscle aches, left shoulder pain, and a hand tremor.  Trials of primidone and topiramate were ineffective.  By 2011 he developed a reduction in left arm swing.  Rasagiline started in 2011 with improvement in fatigue, stiffness and pain.  He under the care of Dr. Cooper Velazquez in [place] since 2014.  Last visit was May 2022.  There is a family history of young onset Parkinson's disease which includes his paternal great grandmother of [place] descent who was diagnosed at age 30 and left her 80s, and her son his paternal uncle. Previously worked as a .  Neuropsych testing in 2017 in suggestive of mild cognitive impairment as related to PD.    Interval history:  He has developed muscle twitching and paresthesias of the ulnar aspect of the right hand.  He also notes episodes of right upper arm twitching which has been present for some time now.  No associated weakness.  Describes more recent upper leg pain which resolved.   He was unable to get carbidopa for his nausea.   Wife here and states he has been on Wellbutrin for years for depression. No improvement noted on the Wellbturin. He never is sad for days at a time. They are intereted in taperin ghi og.   Sleep initiation is an issue. Take atarax but still takes 30-40 minutes to fall asleep. He has awakens at night. He tries to sleep  8 hours.     Otherwise tremors are unchanged.  Overall functioning well at.  No changes in his cognition or speech.    Current PD related medications:  Carbidopa/levodopa 25/250 grams 1 tablet 4 times daily (8am, 12pm, 4pm and 9pm)  Entacapone 200 mg 1 tablet 4 times daily  Baclofen 10 mg 3 times daily prn  For depression:  Bupropion 75 mg twice daily  Hydroxyzine 50 mg nightly    Prior medication trials:  Rasagiline  Amantadine-no improvement  Trihexyphenidyl-ineffective  Pramipexole-show improvement, but associated with sedation while driving  Neupro patch  Provigil for daytime sedation  Stalevo  Melatonin  Ropinirole XL-discontinued due to cost  Trazodone             Objective:    Blood pressure 139/80, pulse 98.    Physical Exam  Vitals reviewed.   Eyes:      Extraocular Movements: Extraocular movements intact.      Pupils: Pupils are equal, round, and reactive to light.   Neurological:      Mental Status: He is alert.      Motor: Motor strength is normal.     Deep Tendon Reflexes:      Reflex Scores:       Tricep reflexes are 2+ on the right side and 2+ on the left side.       Bicep reflexes are 2+ on the right side and 2+ on the left side.        Neurological Exam  Mental Status  Alert. Oriented to person, place, time and situation. Speech: hypophonia. Language is fluent with no aphasia. Attention and concentration are normal.    Cranial Nerves  CN III, IV, VI: Extraocular movements intact bilaterally. Pupils equal round and reactive to light bilaterally.  CN VII: Full and symmetric facial movement.  CN VIII: Hearing is normal.  CN IX, X: Palate elevates symmetrically  CN XI: Shoulder shrug strength is normal.  CN XII: Tongue midline without atrophy or fasciculations.    Motor  Normal muscle bulk throughout. Normal muscle tone. Strength is 5/5 throughout all four extremities.    Sensory  Light touch is normal in upper and lower extremities. Reduced in the ulnar aspect of the right hand..     Reflexes                                             Right                      Left  Biceps                                 2+                         2+  Triceps                                2+                         2+    Coordination    See motor UPDRS    Mild to moderate bradykinesia on hand movements.  Moderate amplitude kinetic tremors on finger-to-nose..    Gait   Able to rise from chair without using arms.  Reduced right arm swing..      MDS UPDRS III                                        Time since last dose:      Speech  1 1   Facial Expression  1 1   Rigidity - Neck  0 0   Rigidity - Upper Extremity (R)  0 0   Rigidity - Upper Extremity (L)   1 0   Rigidity - Lower Extremity (R)  0 0   Rigidity - Lower Extremity (L)   0 0   Finger Taps (R)   2 2   Finger Taps (L)   2 2   Hand Movement (R)  0 0   Hand Movement (L)   1 0   Pronation/Supination (R)  1 2   Pronation/Supination (L)   2 2   Toe Tapping (R) 1 1   Toe Tapping (L) 2 1   Leg Agility (R)  0 1   Leg Agility (L)   0 0   Arising from Chair   0 0   Gait   1  1   Freezing of Gait 0 0   Postural Stability   0 0    Posture 0 0   Global spontaneity of movement 0 0   Postural Tremor (Ri 2 2   Postural Tremor (L) 1 1   Kinetic Tremor (R)  2 2   Kinetic Tremor (L)  2 2   Rest tremor amplitude RUE 0 0   Rest tremor amplitude LUE 0 0   Rest tremor amplitude RLE 0 0   Reset tremor amplitude LLE 0 0   Lip/Jaw Tremor  0 0   Consistency of tremor 0 0   Motor Exam Total:          ROS:    Review of Systems   Constitutional:  Positive for fatigue. Negative for appetite change and fever.   HENT:  Positive for trouble swallowing (In the morning). Negative for hearing loss, tinnitus and voice change.    Eyes: Negative.  Negative for photophobia, pain and visual disturbance.   Respiratory: Negative.  Negative for shortness of breath.    Cardiovascular: Negative.  Negative for palpitations.   Gastrointestinal: Negative.  Negative for nausea and vomiting.   Endocrine: Negative.  Negative  for cold intolerance.   Genitourinary: Negative.  Negative for dysuria, frequency and urgency.   Musculoskeletal:  Positive for gait problem (Stumbles, has stayed the same), myalgias (Legs) and neck stiffness. Negative for back pain and neck pain.        Balance Issues at times, has stayed the same       Skin: Negative.  Negative for rash.   Allergic/Immunologic: Negative.    Neurological:  Positive for dizziness (When standing up), tremors (Hands, has stayed the same) and numbness (Recently has been having this issue in the Right hand (palm area)). Negative for seizures, syncope, facial asymmetry, speech difficulty, weakness, light-headedness and headaches.   Hematological: Negative.  Does not bruise/bleed easily.   Psychiatric/Behavioral:  Positive for sleep disturbance (At times). Negative for confusion and hallucinations.    All other systems reviewed and are negative.

## 2023-12-20 NOTE — ASSESSMENT & PLAN NOTE
History and exam suggestive of mild ulnar neuropathy in the right hand.  Discussed the option of obtaining an EMG.  At this time he wishes to defer.  I discussed reasons to prevent worsening by avoidance of any pressure on the nerve at the elbow.

## 2023-12-20 NOTE — ASSESSMENT & PLAN NOTE
Difficulty initiating maintaining sleep.  Overall has increased fatigue during the day.  He has been on Wellbutrin at for depression.  He is wife is present with him today who states agrees with the patient that  this was never helpful in improving mood.  After discussion we opted to taper off Wellbutrin by having him take 75 mg daily for 2 weeks.  If tolerating then we can start her mirtazapine 15 mg nightly to see if this may help with mood and sleep.  Plans will then be to taper off Atarax.

## 2023-12-27 ENCOUNTER — APPOINTMENT (OUTPATIENT)
Dept: RADIOLOGY | Facility: CLINIC | Age: 43
End: 2023-12-27
Payer: COMMERCIAL

## 2023-12-27 ENCOUNTER — TELEPHONE (OUTPATIENT)
Dept: FAMILY MEDICINE CLINIC | Facility: CLINIC | Age: 43
End: 2023-12-27

## 2023-12-27 DIAGNOSIS — M89.8X6 BONE PAIN OF LOWER LEG: ICD-10-CM

## 2023-12-27 PROCEDURE — 73590 X-RAY EXAM OF LOWER LEG: CPT

## 2023-12-27 NOTE — TELEPHONE ENCOUNTER
----- Message from LESLIE Moura sent at 12/27/2023  2:24 PM EST -----  Please make patient aware that x-rays of lower legs are negative for any arthritic changes, fractures, or lesions.  Thank you

## 2024-01-02 ENCOUNTER — APPOINTMENT (OUTPATIENT)
Dept: LAB | Facility: CLINIC | Age: 44
End: 2024-01-02
Payer: COMMERCIAL

## 2024-01-02 DIAGNOSIS — E78.2 MIXED HYPERLIPIDEMIA: ICD-10-CM

## 2024-01-02 DIAGNOSIS — I10 PRIMARY HYPERTENSION: ICD-10-CM

## 2024-01-02 LAB
EST. AVERAGE GLUCOSE BLD GHB EST-MCNC: 120 MG/DL
HBA1C MFR BLD: 5.8 %

## 2024-01-02 PROCEDURE — 83036 HEMOGLOBIN GLYCOSYLATED A1C: CPT

## 2024-01-02 PROCEDURE — 80061 LIPID PANEL: CPT

## 2024-01-02 PROCEDURE — 36415 COLL VENOUS BLD VENIPUNCTURE: CPT

## 2024-01-02 PROCEDURE — 80053 COMPREHEN METABOLIC PANEL: CPT

## 2024-01-03 LAB
ALBUMIN SERPL BCP-MCNC: 4.6 G/DL (ref 3.5–5)
ALP SERPL-CCNC: 48 U/L (ref 34–104)
ALT SERPL W P-5'-P-CCNC: 23 U/L (ref 7–52)
ANION GAP SERPL CALCULATED.3IONS-SCNC: 9 MMOL/L
AST SERPL W P-5'-P-CCNC: 20 U/L (ref 13–39)
BILIRUB SERPL-MCNC: 0.53 MG/DL (ref 0.2–1)
BUN SERPL-MCNC: 21 MG/DL (ref 5–25)
CALCIUM SERPL-MCNC: 9.8 MG/DL (ref 8.4–10.2)
CHLORIDE SERPL-SCNC: 103 MMOL/L (ref 96–108)
CHOLEST SERPL-MCNC: 146 MG/DL
CO2 SERPL-SCNC: 27 MMOL/L (ref 21–32)
CREAT SERPL-MCNC: 1.01 MG/DL (ref 0.6–1.3)
GFR SERPL CREATININE-BSD FRML MDRD: 91 ML/MIN/1.73SQ M
GLUCOSE P FAST SERPL-MCNC: 98 MG/DL (ref 65–99)
HDLC SERPL-MCNC: 44 MG/DL
LDLC SERPL CALC-MCNC: 64 MG/DL (ref 0–100)
POTASSIUM SERPL-SCNC: 4.1 MMOL/L (ref 3.5–5.3)
PROT SERPL-MCNC: 6.9 G/DL (ref 6.4–8.4)
SODIUM SERPL-SCNC: 139 MMOL/L (ref 135–147)
TRIGL SERPL-MCNC: 189 MG/DL

## 2024-01-12 DIAGNOSIS — F32.0 CURRENT MILD EPISODE OF MAJOR DEPRESSIVE DISORDER WITHOUT PRIOR EPISODE (HCC): ICD-10-CM

## 2024-01-12 RX ORDER — HYDROXYZINE 50 MG/1
50 TABLET, FILM COATED ORAL EVERY EVENING
Qty: 30 TABLET | Refills: 0 | Status: SHIPPED | OUTPATIENT
Start: 2024-01-12

## 2024-01-18 DIAGNOSIS — M16.12 OSTEOARTHRITIS OF LEFT HIP, UNSPECIFIED OSTEOARTHRITIS TYPE: ICD-10-CM

## 2024-01-18 RX ORDER — MELOXICAM 15 MG/1
15 TABLET ORAL DAILY
Qty: 30 TABLET | Refills: 0 | Status: SHIPPED | OUTPATIENT
Start: 2024-01-18

## 2024-02-12 DIAGNOSIS — F32.0 CURRENT MILD EPISODE OF MAJOR DEPRESSIVE DISORDER WITHOUT PRIOR EPISODE (HCC): ICD-10-CM

## 2024-02-12 RX ORDER — HYDROXYZINE 50 MG/1
50 TABLET, FILM COATED ORAL EVERY EVENING
Qty: 30 TABLET | Refills: 0 | Status: SHIPPED | OUTPATIENT
Start: 2024-02-12

## 2024-02-23 DIAGNOSIS — E55.9 VITAMIN D INSUFFICIENCY: ICD-10-CM

## 2024-02-23 DIAGNOSIS — M16.12 OSTEOARTHRITIS OF LEFT HIP, UNSPECIFIED OSTEOARTHRITIS TYPE: ICD-10-CM

## 2024-02-23 RX ORDER — MELOXICAM 15 MG/1
15 TABLET ORAL DAILY
Qty: 30 TABLET | Refills: 0 | Status: SHIPPED | OUTPATIENT
Start: 2024-02-23

## 2024-02-23 RX ORDER — MELATONIN
1000 DAILY
Qty: 90 TABLET | Refills: 0 | Status: SHIPPED | OUTPATIENT
Start: 2024-02-23

## 2024-03-06 ENCOUNTER — RA CDI HCC (OUTPATIENT)
Dept: OTHER | Facility: HOSPITAL | Age: 44
End: 2024-03-06

## 2024-03-11 DIAGNOSIS — F32.0 CURRENT MILD EPISODE OF MAJOR DEPRESSIVE DISORDER WITHOUT PRIOR EPISODE (HCC): ICD-10-CM

## 2024-03-11 RX ORDER — HYDROXYZINE 50 MG/1
50 TABLET, FILM COATED ORAL EVERY EVENING
Qty: 30 TABLET | Refills: 0 | Status: SHIPPED | OUTPATIENT
Start: 2024-03-11

## 2024-03-12 ENCOUNTER — OFFICE VISIT (OUTPATIENT)
Dept: FAMILY MEDICINE CLINIC | Facility: CLINIC | Age: 44
End: 2024-03-12
Payer: COMMERCIAL

## 2024-03-12 VITALS
RESPIRATION RATE: 14 BRPM | BODY MASS INDEX: 25.94 KG/M2 | SYSTOLIC BLOOD PRESSURE: 130 MMHG | TEMPERATURE: 98 F | DIASTOLIC BLOOD PRESSURE: 90 MMHG | WEIGHT: 181.2 LBS | HEART RATE: 100 BPM | OXYGEN SATURATION: 99 % | HEIGHT: 70 IN

## 2024-03-12 DIAGNOSIS — R26.89 OTHER ABNORMALITIES OF GAIT AND MOBILITY: ICD-10-CM

## 2024-03-12 DIAGNOSIS — R53.82 CHRONIC FATIGUE: Primary | ICD-10-CM

## 2024-03-12 DIAGNOSIS — M25.59 PAIN IN OTHER SPECIFIED JOINT: ICD-10-CM

## 2024-03-12 DIAGNOSIS — I10 PRIMARY HYPERTENSION: ICD-10-CM

## 2024-03-12 DIAGNOSIS — R26.9 UNSPECIFIED ABNORMALITIES OF GAIT AND MOBILITY: ICD-10-CM

## 2024-03-12 DIAGNOSIS — J84.10 CALCIFIED GRANULOMA OF LUNG (HCC): ICD-10-CM

## 2024-03-12 DIAGNOSIS — G20.A1 PARKINSON'S DISEASE WITHOUT DYSKINESIA OR FLUCTUATING MANIFESTATIONS: Chronic | ICD-10-CM

## 2024-03-12 DIAGNOSIS — E78.2 MIXED HYPERLIPIDEMIA: ICD-10-CM

## 2024-03-12 DIAGNOSIS — F32.0 CURRENT MILD EPISODE OF MAJOR DEPRESSIVE DISORDER WITHOUT PRIOR EPISODE (HCC): ICD-10-CM

## 2024-03-12 DIAGNOSIS — R73.03 PREDIABETES: ICD-10-CM

## 2024-03-12 DIAGNOSIS — E55.9 VITAMIN D INSUFFICIENCY: ICD-10-CM

## 2024-03-12 PROCEDURE — G2211 COMPLEX E/M VISIT ADD ON: HCPCS | Performed by: NURSE PRACTITIONER

## 2024-03-12 PROCEDURE — 99214 OFFICE O/P EST MOD 30 MIN: CPT | Performed by: NURSE PRACTITIONER

## 2024-03-12 NOTE — ASSESSMENT & PLAN NOTE
Notes worsening fatigue.  Advised to obtain blood work as ordered.  Advised to continue follow-up with neurology as scheduled and to continue on current medications.

## 2024-03-12 NOTE — ASSESSMENT & PLAN NOTE
Blood pressure managed in office today.  Discussed importance of heart healthy diet, to continue on lisinopril as prescribed

## 2024-03-12 NOTE — ASSESSMENT & PLAN NOTE
Patient notes continued fatigue, feels as if fatigue has worsened over the past few months.  Patient denies worsening mental health, or other symptoms.  Advised to continue follow-up with neurology for Parkinson's management.  To obtain blood work as ordered.  Discussed importance of adequate sleep, nutrition, hydration, physical activity.

## 2024-03-12 NOTE — ASSESSMENT & PLAN NOTE
Patient denies symptoms related to depression.  Advised to continue on current medications as prescribed.

## 2024-03-12 NOTE — PROGRESS NOTES
Name: Ralph Broadbent Jr.      : 1980      MRN: [id number]  Encounter Provider: LESLIE Moura  Encounter Date: 3/12/2024   Encounter department: [facility] [address] [place] [address]    Assessment & Plan     1. Chronic fatigue  Assessment & Plan:  Patient notes continued fatigue, feels as if fatigue has worsened over the past few months.  Patient denies worsening mental health, or other symptoms.  Advised to continue follow-up with neurology for Parkinson's management.  To obtain blood work as ordered.  Discussed importance of adequate sleep, nutrition, hydration, physical activity.    Orders:  -     Iron Panel (Includes Ferritin, Iron Sat%, Iron, and TIBC); Future  -     Folate; Future  -     Vitamin B12; Future  -     ANDREA Screen w/ Reflex to Titer/Pattern; Future  -     RF Screen w/ Reflex to Titer; Future; Expected date: 2024  -     Lyme Total AB W Reflex to IGM/IGG; Future  -     Testosterone, free, total; Future    2. Primary hypertension  Assessment & Plan:  Blood pressure managed in office today.  Discussed importance of heart healthy diet, to continue on lisinopril as prescribed    Orders:  -     TSH, 3rd generation with Free T4 reflex; Future  -     Comprehensive metabolic panel; Future  -     CBC and differential; Future  -     Lipid panel; Future  -     Albumin / creatinine urine ratio; Future    3. Mixed hyperlipidemia  -     Comprehensive metabolic panel; Future  -     Lipid panel; Future    4. Vitamin D insufficiency  -     Vitamin D 25 hydroxy; Future    5. Current mild episode of major depressive disorder without prior episode (HCC)  Assessment & Plan:  Patient denies symptoms related to depression.  Advised to continue on current medications as prescribed.      6. Calcified granuloma of lung (HCC)  Assessment & Plan:  Denies respiratory symptoms, hemoptysis.  Will continue to monitor.      7. Prediabetes  -     Comprehensive metabolic panel; Future  -      Hemoglobin A1C; Future; Expected date: 06/12/2024    8. Parkinson's disease without dyskinesia or fluctuating manifestations  Assessment & Plan:  Notes worsening fatigue.  Advised to obtain blood work as ordered.  Advised to continue follow-up with neurology as scheduled and to continue on current medications.    Orders:  -     Iron Panel (Includes Ferritin, Iron Sat%, Iron, and TIBC); Future  -     Folate; Future  -     Vitamin B12; Future  -     ANDREA Screen w/ Reflex to Titer/Pattern; Future  -     RF Screen w/ Reflex to Titer; Future; Expected date: 03/12/2024  -     Lyme Total AB W Reflex to IGM/IGG; Future    9. Pain in other specified joint  -     Iron Panel (Includes Ferritin, Iron Sat%, Iron, and TIBC); Future    10. Unspecified abnormalities of gait and mobility  -     Folate; Future    11. Other abnormalities of gait and mobility  -     Vitamin B12; Future           Subjective      Patient presents office for 4-month follow-up.  Continues to follow-up with neurology for Parkinson's.  Patient notes continued fatigue.  Feels like he had fatigue since diagnosis of Parkinson's, but notes it has been worse over the past several months.  Patient denies fever, chills, unexplained weight loss, unexplained bleeding/bruising.  Notes continued joint pain in bilateral knees and right hip, but with movement and meloxicam, pain subsides.  Patient denies issues with mental health, sleep disturbances.      Review of Systems   Constitutional:  Positive for fatigue. Negative for activity change, appetite change, diaphoresis and unexpected weight change.   HENT:  Negative for congestion, sore throat and trouble swallowing.    Eyes:  Negative for photophobia and visual disturbance.   Respiratory:  Negative for cough, chest tightness and shortness of breath.    Cardiovascular:  Negative for chest pain and palpitations.   Gastrointestinal:  Negative for abdominal pain, blood in stool, constipation, nausea and vomiting.    Endocrine: Negative for polydipsia, polyphagia and polyuria.   Genitourinary:  Negative for decreased urine volume, dysuria, frequency, hematuria and testicular pain.   Musculoskeletal:  Positive for arthralgias (bilateral knees, right hip). Negative for back pain, joint swelling and myalgias.   Skin:  Negative for color change and rash.   Neurological:  Negative for dizziness, seizures, weakness, light-headedness, numbness and headaches.   Hematological:  Negative for adenopathy. Does not bruise/bleed easily.   Psychiatric/Behavioral:  Negative for agitation, confusion, dysphoric mood and sleep disturbance. The patient is not nervous/anxious.        Current Outpatient Medications on File Prior to Visit   Medication Sig    carbidopa-levodopa (SINEMET)  mg per tablet Take 1 tablet by mouth 4 (four) times a day    cholecalciferol (VITAMIN D3) 1,000 units tablet Take 1 tablet (1,000 Units total) by mouth daily    entacapone (COMTAN) 200 mg tablet Take 1 tablet (200 mg total) by mouth 4 (four) times a day    hydrOXYzine HCL (ATARAX) 50 mg tablet Take 1 tablet (50 mg total) by mouth every evening    lisinopril (ZESTRIL) 20 mg tablet Take 1 tablet (20 mg total) by mouth daily    meloxicam (Mobic) 15 mg tablet Take 1 tablet (15 mg total) by mouth daily    mirtazapine (REMERON) 15 mg tablet Take 1 tablet (15 mg total) by mouth daily at bedtime Do not start before January 3, 2024.    omega-3-acid ethyl esters (LOVAZA) 1 g capsule Take 2 capsules (2 g total) by mouth 2 (two) times a day    rosuvastatin (CRESTOR) 10 MG tablet Take 1 tablet (10 mg total) by mouth daily    baclofen 10 mg tablet Take 1 tablet (10 mg total) by mouth 3 (three) times a day (Patient taking differently: Take 10 mg by mouth if needed)    [DISCONTINUED] buPROPion (WELLBUTRIN) 75 mg tablet Take 1 tablet (75 mg total) by mouth 2 (two) times a day    [DISCONTINUED] mupirocin (BACTROBAN) 2 % ointment Apply topically 3 (three) times a day (Patient  "not taking: Reported on 12/20/2023)       Objective     /90   Pulse 100   Temp 98 °F (36.7 °C)   Resp 14   Ht 5' 10\" (1.778 m)   Wt 82.2 kg (181 lb 3.2 oz)   SpO2 99%   BMI 26.00 kg/m²     Physical Exam  Vitals reviewed.   Constitutional:       General: He is not in acute distress.     Appearance: Normal appearance. He is not ill-appearing.   HENT:      Head: Normocephalic and atraumatic.      Right Ear: Tympanic membrane, ear canal and external ear normal.      Left Ear: Tympanic membrane, ear canal and external ear normal.      Nose: Nose normal.      Mouth/Throat:      Mouth: Mucous membranes are moist.      Pharynx: Oropharynx is clear.   Eyes:      Conjunctiva/sclera: Conjunctivae normal.      Pupils: Pupils are equal, round, and reactive to light.   Cardiovascular:      Rate and Rhythm: Normal rate and regular rhythm.      Pulses: Normal pulses.      Heart sounds: Normal heart sounds. No murmur heard.  Pulmonary:      Effort: Pulmonary effort is normal.   Abdominal:      General: Bowel sounds are normal.      Palpations: Abdomen is soft.   Musculoskeletal:         General: Normal range of motion.      Cervical back: Normal range of motion and neck supple.      Right lower leg: No edema.      Left lower leg: No edema.   Lymphadenopathy:      Cervical: No cervical adenopathy.   Skin:     General: Skin is warm and dry.   Neurological:      General: No focal deficit present.      Mental Status: He is alert and oriented to person, place, and time.   Psychiatric:         Mood and Affect: Mood normal.         Behavior: Behavior normal.       LESLIE Moura    "

## 2024-03-18 ENCOUNTER — APPOINTMENT (OUTPATIENT)
Dept: LAB | Facility: CLINIC | Age: 44
End: 2024-03-18
Payer: COMMERCIAL

## 2024-03-18 DIAGNOSIS — E78.2 MIXED HYPERLIPIDEMIA: ICD-10-CM

## 2024-03-18 DIAGNOSIS — M25.59 PAIN IN OTHER SPECIFIED JOINT: ICD-10-CM

## 2024-03-18 DIAGNOSIS — R26.9 UNSPECIFIED ABNORMALITIES OF GAIT AND MOBILITY: ICD-10-CM

## 2024-03-18 DIAGNOSIS — R73.03 PREDIABETES: ICD-10-CM

## 2024-03-18 DIAGNOSIS — E55.9 VITAMIN D INSUFFICIENCY: ICD-10-CM

## 2024-03-18 DIAGNOSIS — G20.A1 PARKINSON'S DISEASE WITHOUT DYSKINESIA OR FLUCTUATING MANIFESTATIONS: ICD-10-CM

## 2024-03-18 DIAGNOSIS — I10 PRIMARY HYPERTENSION: ICD-10-CM

## 2024-03-18 DIAGNOSIS — R53.82 CHRONIC FATIGUE: ICD-10-CM

## 2024-03-18 DIAGNOSIS — R26.89 OTHER ABNORMALITIES OF GAIT AND MOBILITY: ICD-10-CM

## 2024-03-18 LAB
25(OH)D3 SERPL-MCNC: 32.3 NG/ML (ref 30–100)
ALBUMIN SERPL BCP-MCNC: 5.1 G/DL (ref 3.5–5)
ALP SERPL-CCNC: 51 U/L (ref 34–104)
ALT SERPL W P-5'-P-CCNC: 17 U/L (ref 7–52)
ANA SER QL IA: NEGATIVE
ANION GAP SERPL CALCULATED.3IONS-SCNC: 10 MMOL/L (ref 4–13)
AST SERPL W P-5'-P-CCNC: 20 U/L (ref 13–39)
B BURGDOR IGG+IGM SER QL IA: NEGATIVE
BASOPHILS # BLD AUTO: 0.04 THOUSANDS/ÂΜL (ref 0–0.1)
BASOPHILS NFR BLD AUTO: 1 % (ref 0–1)
BILIRUB SERPL-MCNC: 0.56 MG/DL (ref 0.2–1)
BUN SERPL-MCNC: 25 MG/DL (ref 5–25)
CALCIUM SERPL-MCNC: 10.1 MG/DL (ref 8.4–10.2)
CHLORIDE SERPL-SCNC: 102 MMOL/L (ref 96–108)
CHOLEST SERPL-MCNC: 132 MG/DL
CO2 SERPL-SCNC: 28 MMOL/L (ref 21–32)
CREAT SERPL-MCNC: 1.01 MG/DL (ref 0.6–1.3)
CREAT UR-MCNC: 419.8 MG/DL
EOSINOPHIL # BLD AUTO: 0.16 THOUSAND/ÂΜL (ref 0–0.61)
EOSINOPHIL NFR BLD AUTO: 4 % (ref 0–6)
ERYTHROCYTE [DISTWIDTH] IN BLOOD BY AUTOMATED COUNT: 12.1 % (ref 11.6–15.1)
FERRITIN SERPL-MCNC: 156 NG/ML (ref 24–336)
FOLATE SERPL-MCNC: 13.2 NG/ML
GFR SERPL CREATININE-BSD FRML MDRD: 91 ML/MIN/1.73SQ M
GLUCOSE P FAST SERPL-MCNC: 105 MG/DL (ref 65–99)
HCT VFR BLD AUTO: 46.3 % (ref 36.5–49.3)
HDLC SERPL-MCNC: 39 MG/DL
HGB BLD-MCNC: 15.5 G/DL (ref 12–17)
IMM GRANULOCYTES # BLD AUTO: 0.01 THOUSAND/UL (ref 0–0.2)
IMM GRANULOCYTES NFR BLD AUTO: 0 % (ref 0–2)
IRON SATN MFR SERPL: 29 % (ref 15–50)
IRON SERPL-MCNC: 98 UG/DL (ref 50–212)
LDLC SERPL CALC-MCNC: 58 MG/DL (ref 0–100)
LYMPHOCYTES # BLD AUTO: 1.05 THOUSANDS/ÂΜL (ref 0.6–4.47)
LYMPHOCYTES NFR BLD AUTO: 26 % (ref 14–44)
MCH RBC QN AUTO: 29.7 PG (ref 26.8–34.3)
MCHC RBC AUTO-ENTMCNC: 33.5 G/DL (ref 31.4–37.4)
MCV RBC AUTO: 89 FL (ref 82–98)
MICROALBUMIN UR-MCNC: 32.2 MG/L
MICROALBUMIN/CREAT 24H UR: 8 MG/G CREATININE (ref 0–30)
MONOCYTES # BLD AUTO: 0.42 THOUSAND/ÂΜL (ref 0.17–1.22)
MONOCYTES NFR BLD AUTO: 10 % (ref 4–12)
NEUTROPHILS # BLD AUTO: 2.44 THOUSANDS/ÂΜL (ref 1.85–7.62)
NEUTS SEG NFR BLD AUTO: 59 % (ref 43–75)
NONHDLC SERPL-MCNC: 93 MG/DL
NRBC BLD AUTO-RTO: 0 /100 WBCS
PLATELET # BLD AUTO: 206 THOUSANDS/UL (ref 149–390)
PMV BLD AUTO: 10.3 FL (ref 8.9–12.7)
POTASSIUM SERPL-SCNC: 4.1 MMOL/L (ref 3.5–5.3)
PROT SERPL-MCNC: 7.4 G/DL (ref 6.4–8.4)
RBC # BLD AUTO: 5.22 MILLION/UL (ref 3.88–5.62)
SODIUM SERPL-SCNC: 140 MMOL/L (ref 135–147)
TIBC SERPL-MCNC: 339 UG/DL (ref 250–450)
TRIGL SERPL-MCNC: 173 MG/DL
TSH SERPL DL<=0.05 MIU/L-ACNC: 1.04 UIU/ML (ref 0.45–4.5)
UIBC SERPL-MCNC: 241 UG/DL (ref 155–355)
VIT B12 SERPL-MCNC: 247 PG/ML (ref 180–914)
WBC # BLD AUTO: 4.12 THOUSAND/UL (ref 4.31–10.16)

## 2024-03-18 PROCEDURE — 85025 COMPLETE CBC W/AUTO DIFF WBC: CPT

## 2024-03-18 PROCEDURE — 83550 IRON BINDING TEST: CPT

## 2024-03-18 PROCEDURE — 82043 UR ALBUMIN QUANTITATIVE: CPT

## 2024-03-18 PROCEDURE — 82746 ASSAY OF FOLIC ACID SERUM: CPT

## 2024-03-18 PROCEDURE — 83540 ASSAY OF IRON: CPT

## 2024-03-18 PROCEDURE — 86038 ANTINUCLEAR ANTIBODIES: CPT

## 2024-03-18 PROCEDURE — 82570 ASSAY OF URINE CREATININE: CPT

## 2024-03-18 PROCEDURE — 86430 RHEUMATOID FACTOR TEST QUAL: CPT

## 2024-03-18 PROCEDURE — 82306 VITAMIN D 25 HYDROXY: CPT

## 2024-03-18 PROCEDURE — 82607 VITAMIN B-12: CPT

## 2024-03-18 PROCEDURE — 84402 ASSAY OF FREE TESTOSTERONE: CPT

## 2024-03-18 PROCEDURE — 84443 ASSAY THYROID STIM HORMONE: CPT

## 2024-03-18 PROCEDURE — 80061 LIPID PANEL: CPT

## 2024-03-18 PROCEDURE — 36415 COLL VENOUS BLD VENIPUNCTURE: CPT

## 2024-03-18 PROCEDURE — 86618 LYME DISEASE ANTIBODY: CPT

## 2024-03-18 PROCEDURE — 82728 ASSAY OF FERRITIN: CPT

## 2024-03-18 PROCEDURE — 84403 ASSAY OF TOTAL TESTOSTERONE: CPT

## 2024-03-18 PROCEDURE — 80053 COMPREHEN METABOLIC PANEL: CPT

## 2024-03-19 LAB
RHEUMATOID FACT SER QL LA: NEGATIVE
TESTOST FREE SERPL-MCNC: 6.5 PG/ML (ref 6.8–21.5)
TESTOST SERPL-MCNC: 315 NG/DL (ref 264–916)

## 2024-03-21 DIAGNOSIS — E55.9 VITAMIN D INSUFFICIENCY: ICD-10-CM

## 2024-03-21 DIAGNOSIS — M16.12 OSTEOARTHRITIS OF LEFT HIP, UNSPECIFIED OSTEOARTHRITIS TYPE: ICD-10-CM

## 2024-03-21 RX ORDER — MELOXICAM 15 MG/1
15 TABLET ORAL DAILY
Qty: 30 TABLET | Refills: 0 | Status: SHIPPED | OUTPATIENT
Start: 2024-03-21

## 2024-03-21 RX ORDER — MELATONIN
1000 DAILY
Qty: 90 TABLET | Refills: 0 | Status: SHIPPED | OUTPATIENT
Start: 2024-03-21

## 2024-03-28 NOTE — PROGRESS NOTES
Assessment and Plan:     Problem List Items Addressed This Visit     None      Visit Diagnoses     Medicare annual wellness visit, subsequent        Pt is utd on his labs  Follows with neuro for Parksinsons and memory loss  Refuses all vaccines  Tobacco dependence        Advised smoking cessation  Pt is not ready to stop smoking  Info given  Preventive health issues were discussed with patient, and age appropriate screening tests were ordered as noted in patient's After Visit Summary  Personalized health advice and appropriate referrals for health education or preventive services given if needed, as noted in patient's After Visit Summary  History of Present Illness:     Patient presents for Medicare Annual Wellness visit    Patient Care Team:  Cuco hearn PCP - General (Family Medicine)     Problem List:     Patient Active Problem List   Diagnosis    [facility] ) - early onset    Current every day smoker      Past Medical and Surgical History:     Past Medical History:   Diagnosis Date    [facility] ([facility] )      No past surgical history on file  Family History:     No family history on file     Social History:        Social History     Socioeconomic History    Marital status: /Civil Union     Spouse name: None    Number of children: None    Years of education: None    Highest education level: None   Occupational History    None   Social Needs    Financial resource strain: None    Food insecurity     Worry: None     Inability: None    Transportation needs     Medical: None     Non-medical: None   Tobacco Use    Smoking status: Current Some Day Smoker     Packs/day: 0 50     Types: Cigarettes    Smokeless tobacco: Never Used   Substance and Sexual Activity    Alcohol use: Yes     Frequency: Monthly or less     Binge frequency: Never     Comment: friday nights    Drug use: No    Sexual activity: None   Lifestyle    Physical activity Days per week: None     Minutes per session: None    Stress: None   Relationships    Social connections     Talks on phone: None     Gets together: None     Attends Restorationist service: None     Active member of club or organization: None     Attends meetings of clubs or organizations: None     Relationship status: None    Intimate partner violence     Fear of current or ex partner: None     Emotionally abused: None     Physically abused: None     Forced sexual activity: None   Other Topics Concern    None   Social History Narrative    None      Medications and Allergies:     Current Outpatient Medications   Medication Sig Dispense Refill    carbidopa-levodopa (SINEMET)  mg per tablet take 2 tablets by mouth five times a day      entacapone (COMTAN) 200 mg tablet take 1 tablet by mouth five times a day      hydrOXYzine HCL (ATARAX) 50 mg tablet Take 50 mg by mouth every evening      omega-3-acid ethyl esters (LOVAZA) 1 g capsule Take 2 capsules (2 g total) by mouth 2 (two) times a day 120 capsule 3    rosuvastatin (CRESTOR) 10 MG tablet take 1 tablet by mouth once daily 30 tablet 1    carbidopa-levodopa-entacapone (STALEVO) -200 MG per tablet 1 tablet 5 (five) times a day      Icosapent Ethyl (Vascepa) 1 g CAPS Take 2 capsules (2 g total) by mouth 2 (two) times a day 120 capsule 3     No current facility-administered medications for this visit        No Known Allergies   Immunizations:     Immunization History   Administered Date(s) Administered    DTaP 5 04/01/2014      Health Maintenance:         Topic Date Due    HIV Screening  Completed         Topic Date Due    Pneumococcal Vaccine: Pediatrics (0 to 5 Years) and At-Risk Patients (6 to 59 Years) (1 of 1 - PPSV23) Never done    COVID-19 Vaccine (1) Never done      Medicare Health Risk Assessment:     /90   Pulse 97   Temp 97 8 °F (36 6 °C)   Resp 16   Ht 5' 10" (1 778 m)   SpO2 97%   BMI 26 43 kg/m²      Tristen Mcdonald is here for his Other (specify) Subsequent Wellness visit  Health Risk Assessment:   Patient rates overall health as good  Patient feels that their physical health rating is slightly worse  Patient is satisfied with their life  Eyesight was rated as same  Hearing was rated as same  Patient feels that their emotional and mental health rating is same  Patients states they are never, rarely angry  Patient states they are often unusually tired/fatigued  Pain experienced in the last 7 days has been some  Patient's pain rating has been 3/10  Patient states that he has experienced weight loss or gain in last 6 months  Depression Screening:   PHQ-2 Score: 0      Fall Risk Screening: In the past year, patient has experienced: history of falling in past year    Number of falls: 2 or more  Injured during fall?: No    Feels unsteady when standing or walking?: Yes    Worried about falling?: Yes      Home Safety:  Patient has working smoke alarms and has working carbon monoxide detector  Home safety hazards include: none  Nutrition:   Current diet is Regular  Medications:   Patient is not currently taking any over-the-counter supplements  Patient is able to manage medications  Activities of Daily Living (ADLs)/Instrumental Activities of Daily Living (IADLs):   Walk and transfer into and out of bed and chair?: Yes  Dress and groom yourself?: Yes    Bathe or shower yourself?: Yes    Feed yourself?  Yes  Do your laundry/housekeeping?: Yes  Manage your money, pay your bills and track your expenses?: Yes  Make your own meals?: Yes    Do your own shopping?: Yes    Previous Hospitalizations:   Any hospitalizations or ED visits within the last 12 months?: No      Advance Care Planning:   Living will: No    Durable POA for healthcare: No    Advanced directive: No    Advanced directive counseling given: No    Five wishes given: No    Patient declined ACP directive: Yes    End of Life Decisions reviewed with patient: Yes    Provider agrees with end of life decisions: Yes      Comments: Pt would not want resuscitation if no hope for recovery    Cognitive Screening:   Provider or family/friend/caregiver concerned regarding cognition?: Yes    Cognition Comments: Pt has memory loss from Parkinsons and has f/u with neurology and also had a 5 hour study from [facility]       PREVENTIVE SCREENINGS      Cardiovascular Screening:    General: Screening Not Indicated and History Lipid Disorder      Diabetes Screening:     General: Screening Current      Colorectal Cancer Screening:     General: Risks and Benefits Discussed and Patient Declines      Prostate Cancer Screening:    General: Screening Not Indicated      Osteoporosis Screening:    General: Risks and Benefits Discussed and Screening Not Indicated      Abdominal Aortic Aneurysm (AAA) Screening:    Risk factors include: tobacco use        General: Screening Not Indicated      Lung Cancer Screening:     General: Screening Not Indicated      Hepatitis C Screening:    General: Risks and Benefits Discussed and Patient Declines      LESLIE Trivedi

## 2024-04-02 DIAGNOSIS — R79.89 DECREASED FREE TESTOSTERONE LEVEL IN MALE: Primary | ICD-10-CM

## 2024-04-03 DIAGNOSIS — F32.0 CURRENT MILD EPISODE OF MAJOR DEPRESSIVE DISORDER WITHOUT PRIOR EPISODE (HCC): ICD-10-CM

## 2024-04-03 RX ORDER — HYDROXYZINE 50 MG/1
50 TABLET, FILM COATED ORAL EVERY EVENING
Qty: 30 TABLET | Refills: 0 | Status: SHIPPED | OUTPATIENT
Start: 2024-04-03

## 2024-04-08 ENCOUNTER — APPOINTMENT (OUTPATIENT)
Dept: LAB | Facility: CLINIC | Age: 44
End: 2024-04-08
Payer: COMMERCIAL

## 2024-04-08 DIAGNOSIS — R79.89 DECREASED FREE TESTOSTERONE LEVEL IN MALE: ICD-10-CM

## 2024-04-08 PROCEDURE — 84403 ASSAY OF TOTAL TESTOSTERONE: CPT

## 2024-04-08 PROCEDURE — 36415 COLL VENOUS BLD VENIPUNCTURE: CPT

## 2024-04-08 PROCEDURE — 84402 ASSAY OF FREE TESTOSTERONE: CPT

## 2024-04-09 LAB
TESTOST FREE SERPL-MCNC: 6.1 PG/ML (ref 6.8–21.5)
TESTOST SERPL-MCNC: 248 NG/DL (ref 264–916)

## 2024-04-10 ENCOUNTER — TELEPHONE (OUTPATIENT)
Age: 44
End: 2024-04-10

## 2024-04-10 DIAGNOSIS — R53.82 CHRONIC FATIGUE: ICD-10-CM

## 2024-04-10 DIAGNOSIS — R79.89 LOW TESTOSTERONE IN MALE: Primary | ICD-10-CM

## 2024-04-25 ENCOUNTER — OFFICE VISIT (OUTPATIENT)
Dept: NEUROLOGY | Facility: CLINIC | Age: 44
End: 2024-04-25
Payer: COMMERCIAL

## 2024-04-25 VITALS
WEIGHT: 179 LBS | HEART RATE: 92 BPM | SYSTOLIC BLOOD PRESSURE: 146 MMHG | DIASTOLIC BLOOD PRESSURE: 82 MMHG | BODY MASS INDEX: 25.68 KG/M2

## 2024-04-25 DIAGNOSIS — H53.9 TRANSIENT VISUAL DISTURBANCE: ICD-10-CM

## 2024-04-25 DIAGNOSIS — G47.9 SLEEP DISTURBANCE: ICD-10-CM

## 2024-04-25 DIAGNOSIS — G20.A1 PARKINSON'S DISEASE WITHOUT DYSKINESIA OR FLUCTUATING MANIFESTATIONS: Primary | Chronic | ICD-10-CM

## 2024-04-25 PROCEDURE — 99214 OFFICE O/P EST MOD 30 MIN: CPT | Performed by: PSYCHIATRY & NEUROLOGY

## 2024-04-25 NOTE — ASSESSMENT & PLAN NOTE
Insomnia and motor symptoms appear to be fairly well-controlled on carbidopa/levodopa 25/251 tablet 4 times daily along with entacapone 4 times daily.  Denies any wearing off or development of dyskinesia.      Main concerns continue to be difficulty initiating sleep and the development of fatigue.     We spoke about fatigue and its relation to Parkinson's disease.  It is often multifactorial.  We look at physical, psychological and physiological causes.  PCP has done lab work looking for physical causes and found him to have low testosterone.  He is scheduled to see endocrine.  Other labs were essentially normal except for B12 was on the low normal side at 247.  In those with PD there may be benefit for supplementation to keep numbers above 400.  Will have him take vitamin B12 1000 mcg daily for the next 3 months.  He denies any feelings of depression or anxiety at this point.  He is on mirtazapine 15 mg nightly.  Despite a difficulty with initiating sleep he does seem to sleep 7 to 8 hours nightly.  With Parkinson's deconditioning or lack of exercise can contribute to fatigue.  He was encouraged to slowly increase routine exercises to at least 30 minutes daily.  This could be a combination of aerobics and exercises for balance and strength.  Discussed the options of joining PD specific exercise group but he is not interested at this time.

## 2024-04-25 NOTE — PROGRESS NOTES
Review of Systems   Constitutional:  Positive for fatigue. Negative for appetite change and fever.   HENT: Negative.  Negative for hearing loss, tinnitus, trouble swallowing and voice change.    Eyes: Negative.  Negative for photophobia, pain and visual disturbance.   Respiratory: Negative.  Negative for shortness of breath.    Cardiovascular: Negative.  Negative for palpitations.   Gastrointestinal: Negative.  Negative for nausea and vomiting.   Endocrine: Negative.  Negative for cold intolerance.   Genitourinary: Negative.  Negative for dysuria, frequency and urgency.   Musculoskeletal:  Positive for gait problem (Stumbles) and neck stiffness. Negative for back pain, myalgias and neck pain.        Balance Issues, has stayed about the same     Skin: Negative.  Negative for rash.   Allergic/Immunologic: Negative.    Neurological:  Positive for dizziness (When standing up quick), tremors (Hands, has stayed about the same) and numbness (At times). Negative for seizures, syncope, facial asymmetry, speech difficulty, weakness, light-headedness and headaches.   Hematological: Negative.  Does not bruise/bleed easily.   Psychiatric/Behavioral:  Positive for sleep disturbance (At times). Negative for confusion and hallucinations.    All other systems reviewed and are negative.

## 2024-04-25 NOTE — ASSESSMENT & PLAN NOTE
Difficulty initiating sleep despite taking Atarax.  He has been on Atarax for years and is not sure if this is adding any benefit.  He questions whether he should taper off.  Will have him reduce to 25 mg nightly and see if he notes any difference before considering discontinuation.  He does appear to be sleeping 7 to 8 hours a night but still has daytime fatigue.  He denies any known dream enactment or apneic spells.

## 2024-04-25 NOTE — PATIENT INSTRUCTIONS
Continue on carbidopa/levodopa  and entacapone.   If unclear if Atarax is helping reduce to 25mg nightly and see if still able to fall asleep in 45 minutes . If so , no change we could consider discontinuing if not adding any benefit.     Take Vitamin B12 1000mcg daily for the next 3 months.    Try to slowly increase exercise to at least 30 minutes daily. Good treatment for fatigue and

## 2024-04-25 NOTE — PROGRESS NOTES
Patient ID: Ralph Broadbent Jr. is a 43 y.o. male    Assessment/Plan:    Parkinson's disease  Insomnia and motor symptoms appear to be fairly well-controlled on carbidopa/levodopa 25/251 tablet 4 times daily along with entacapone 4 times daily.  Denies any wearing off or development of dyskinesia.      Main concerns continue to be difficulty initiating sleep and the development of fatigue.     We spoke about fatigue and its relation to Parkinson's disease.  It is often multifactorial.  We look at physical, psychological and physiological causes.  PCP has done lab work looking for physical causes and found him to have low testosterone.  He is scheduled to see endocrine.  Other labs were essentially normal except for B12 was on the low normal side at 247.  In those with PD there may be benefit for supplementation to keep numbers above 400.  Will have him take vitamin B12 1000 mcg daily for the next 3 months.  He denies any feelings of depression or anxiety at this point.  He is on mirtazapine 15 mg nightly.  Despite a difficulty with initiating sleep he does seem to sleep 7 to 8 hours nightly.  With Parkinson's deconditioning or lack of exercise can contribute to fatigue.  He was encouraged to slowly increase routine exercises to at least 30 minutes daily.  This could be a combination of aerobics and exercises for balance and strength.  Discussed the options of joining PD specific exercise group but he is not interested at this time.    Sleep disturbance  Difficulty initiating sleep despite taking Atarax.  He has been on Atarax for years and is not sure if this is adding any benefit.  He questions whether he should taper off.  Will have him reduce to 25 mg nightly and see if he notes any difference before considering discontinuation.  He does appear to be sleeping 7 to 8 hours a night but still has daytime fatigue.  He denies any known dream enactment or apneic spells.    Transient visual disturbance  In December  he had One episode of transient wavy vision out of the left eye lasting about 3 minutes.  No further episodes.  No associated headaches or other symptoms.  This is never happened in the past.  Also with episodic blurriness.  He did see ophthalmology.  Glasses did not have to be adjusted.  All testing was set to be normal.  They stated he should mention symptoms to his neurologist.  Lipids reviewed, he has elevated triglycerides.  Adjusting diet for diabetes prevention.  Reports last MRI of the brain was over 10 years ago.  Will obtain MRI of the brain looking for underlying vascular changes.      Diagnoses and all orders for this visit:    Parkinson's disease without dyskinesia or fluctuating manifestations  -     MRI brain without contrast; Future    Sleep disturbance    Transient visual disturbance  -     MRI brain without contrast; Future        Subjective:      Micah Amaya is a 43 year old with hypertension, hypertriglyceridemia, osteoarthritis, and Young onset Parkinson's disease who presents for movement disorder follow up.    Review of initial history: Parkinson disease was diagnosed around March 2011 at [facility] with Dr. Mcfadden.  Prior to this he had a 5-year history of unexplained neurological symptoms including paresthesias of all 4 limbs.  Work-up (including LP) at [facility] for Lyme disease was unremarkable.  He tried various medications for neuropathic pain including  gabapentin, Lyrica, Flexeril, and Cymbalta.  Symptoms progressed to include fatigue, muscle aches, left shoulder pain, and a hand tremor.  Trials of primidone and topiramate were ineffective.  By 2011 he developed a reduction in left arm swing.  Rasagiline started in 2011 with improvement in fatigue, stiffness and pain.  He under the care of Dr. Cooper Velazquez in [place] since 2014.  Last visit was May 2022.  There is a family history of young onset Parkinson's disease which includes his paternal great grandmother of [place] descent who was  "diagnosed at age 30, great grandmother lived until 86, great paternal uncle. Previously worked as a .  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      Tremor vary but overall stable.   No difficulty chewing. On occasion there is some difficulty swallowing but following with liquids takes care oft this.   He is able to perform ADL's independently.   He stumbles if moving too fast but gait overall  the same.      There are no issues with urination. He does have constipation. He tries to improve diet overall.     There are occasion experiences with lightheadedness if standing to quickly.   Cognition is unchanged.   No hallucinations.     Sleep initiation has been an issues. Atarax taken but still takes 30-40 minutes to fall asleep.  Sleeps about 6-8 hours nightly. He has daytime sedation. No dream enactment that he knows off.     Labs reviewed. B12 low normal 247.    He has fatigue and PCP ran test and is sending him to endocrinology for low testosterone.   Does not feel depressed. Feels mellow.      He has episodes of blurry vision. These has reduced in frequency.   He did have one episodes \"wavy\" vision in the left eye in Dec. Lasted 2-3 minutes. No visual loss or other symptoms. This has not recurred.    He saw an eye doctor but all testing was said to be fine and prescription did not change.      Current PD related medications:  Carbidopa/levodopa 25/250 grams 1 tablet 4 times daily (8am, 12pm, 4pm and 9pm)- nausea   Entacapone 200 mg 1 tablet 4 times daily  Baclofen 10 mg 3 times daily prn rarely used     For depression:  Mirtazepine 15mg qhs  Hydroxyzine 50 mg nightly    Prior medication trials:  Rasagiline  Amantadine-no improvement  Trihexyphenidyl-ineffective  Pramipexole-show improvement, but associated with sedation while driving  Neupro patch  Provigil for daytime " sedation  Stalevo  Melatonin  Ropinirole XL-discontinued due to cost  Trazodone    Bupropion 75 mg twice daily        Objective:    /82 (BP Location: Left arm, Patient Position: Standing, Cuff Size: Large)   Pulse 92   Wt 81.2 kg (179 lb)   BMI 25.68 kg/m²       Physical Exam  Vitals reviewed.   Eyes:      Extraocular Movements: Extraocular movements intact.      Pupils: Pupils are equal, round, and reactive to light.   Neurological:      Mental Status: He is alert.      Motor: Motor strength is normal.        Neurological Exam  Mental Status  Alert. Oriented to person, place, time and situation. Speech: Mild hypophonia  . Language is fluent with no aphasia. Attention and concentration are normal.    Cranial Nerves  CN III, IV, VI: Extraocular movements intact bilaterally. Pupils equal round and reactive to light bilaterally.  CN VII: Full and symmetric facial movement.  CN VIII: Hearing is normal.  CN IX, X: Palate elevates symmetrically  CN XI: Shoulder shrug strength is normal.  CN XII: Tongue midline without atrophy or fasciculations.    Motor   Increased muscle tone. In left upper extremity with augmentation. Strength is 5/5 throughout all four extremities.    Coordination    See motor UPDRS    No rest tremor.  Mild intentional postural tremors bilaterally.  Mild bradykinesia with decrement..    Gait  Casual gait: Able to rise from chair without using arms.  Stride and speed.  Normal other than reduced arm swing..    MDS UPDRS III                              4/25/24   Time since last dose:    Speech  1   Facial Expression  1   Rigidity - Neck  0   Rigidity - Upper Extremity (R)  0   Rigidity - Upper Extremity (L)   1   Rigidity - Lower Extremity (R)  0   Rigidity - Lower Extremity (L)   0   Finger Taps (R)   2   Finger Taps (L)   2>   Hand Movement (R)  0   Hand Movement (L)   1   Pronation/Supination (R)  1   Pronation/Supination (L)   2   Toe Tapping (R) 1   Toe Tapping (L) 1   Leg Agility (R)  0    Leg Agility (L)   0   Arising from Chair   0   Gait   1   Freezing of Gait 0   Postural Stability   0   Posture 0   Global spontaneity of movement 0   Postural Tremor (Ri 2   Postural Tremor (L) 1   Kinetic Tremor (R)  1   Kinetic Tremor (L)  1   Rest tremor amplitude RUE 0   Rest tremor amplitude LUE 0   Rest tremor amplitude RLE 0   Reset tremor amplitude LLE 0   Lip/Jaw Tremor  0   Consistency of tremor 0   Motor Exam Total:                 Shanice Cardona MD  Movement disorder physician  [facility]

## 2024-04-25 NOTE — ASSESSMENT & PLAN NOTE
In December he had One episode of transient wavy vision out of the left eye lasting about 3 minutes.  No further episodes.  No associated headaches or other symptoms.  This is never happened in the past.  Also with episodic blurriness.  He did see ophthalmology.  Glasses did not have to be adjusted.  All testing was set to be normal.  They stated he should mention symptoms to his neurologist.  Lipids reviewed, he has elevated triglycerides.  Adjusting diet for diabetes prevention.  Reports last MRI of the brain was over 10 years ago.  Will obtain MRI of the brain looking for underlying vascular changes.

## 2024-05-02 DIAGNOSIS — M16.12 OSTEOARTHRITIS OF LEFT HIP, UNSPECIFIED OSTEOARTHRITIS TYPE: ICD-10-CM

## 2024-05-03 ENCOUNTER — HOSPITAL ENCOUNTER (OUTPATIENT)
Dept: MRI IMAGING | Facility: CLINIC | Age: 44
Discharge: HOME | End: 2024-05-03
Payer: COMMERCIAL

## 2024-05-03 DIAGNOSIS — F32.0 CURRENT MILD EPISODE OF MAJOR DEPRESSIVE DISORDER WITHOUT PRIOR EPISODE (HCC): ICD-10-CM

## 2024-05-03 DIAGNOSIS — H53.9 TRANSIENT VISUAL DISTURBANCE: ICD-10-CM

## 2024-05-03 DIAGNOSIS — M16.12 OSTEOARTHRITIS OF LEFT HIP, UNSPECIFIED OSTEOARTHRITIS TYPE: ICD-10-CM

## 2024-05-03 DIAGNOSIS — G20.A1 PARKINSON'S DISEASE WITHOUT DYSKINESIA OR FLUCTUATING MANIFESTATIONS: Chronic | ICD-10-CM

## 2024-05-03 PROCEDURE — 70551 MRI BRAIN STEM W/O DYE: CPT

## 2024-05-03 RX ORDER — MELOXICAM 15 MG/1
15 TABLET ORAL DAILY
Qty: 30 TABLET | Refills: 5 | Status: SHIPPED | OUTPATIENT
Start: 2024-05-03

## 2024-05-03 NOTE — TELEPHONE ENCOUNTER
Medication: Mobic    Dose/Frequency: 15mg daily    Quantity: 30 Refills 0    Pharmacy: [facility]    Office:   [x] PCP/Provider -   [] Speciality/Provider -     Does the patient have enough for 3 days?   [x] Yes   [] No - Send as HP to POD

## 2024-05-05 RX ORDER — MELOXICAM 15 MG/1
15 TABLET ORAL DAILY
Qty: 30 TABLET | Refills: 0 | OUTPATIENT
Start: 2024-05-05

## 2024-05-05 RX ORDER — HYDROXYZINE 50 MG/1
50 TABLET, FILM COATED ORAL EVERY EVENING
Qty: 30 TABLET | Refills: 5 | Status: SHIPPED | OUTPATIENT
Start: 2024-05-05

## 2024-05-07 RX ORDER — MELOXICAM 15 MG/1
15 TABLET ORAL DAILY
Qty: 30 TABLET | Refills: 0 | OUTPATIENT
Start: 2024-05-07

## 2024-05-22 DIAGNOSIS — G20.A1 PARKINSON'S DISEASE: ICD-10-CM

## 2024-05-22 RX ORDER — CARBIDOPA/LEVODOPA 25MG-250MG
1 TABLET ORAL 4 TIMES DAILY
Qty: 360 TABLET | Refills: 1 | Status: SHIPPED | OUTPATIENT
Start: 2024-05-22

## 2024-05-23 DIAGNOSIS — G20.A1 PARKINSON'S DISEASE: ICD-10-CM

## 2024-05-23 RX ORDER — ENTACAPONE 200 MG/1
200 TABLET ORAL 4 TIMES DAILY
Qty: 120 TABLET | Refills: 5 | Status: SHIPPED | OUTPATIENT
Start: 2024-05-23

## 2024-05-31 ENCOUNTER — OFFICE VISIT (OUTPATIENT)
Dept: ENDOCRINOLOGY | Facility: CLINIC | Age: 44
End: 2024-05-31
Payer: MEDICARE

## 2024-05-31 VITALS
DIASTOLIC BLOOD PRESSURE: 70 MMHG | SYSTOLIC BLOOD PRESSURE: 136 MMHG | WEIGHT: 176 LBS | TEMPERATURE: 98.4 F | BODY MASS INDEX: 25.25 KG/M2 | OXYGEN SATURATION: 98 % | HEART RATE: 96 BPM

## 2024-05-31 DIAGNOSIS — R79.89 LOW TESTOSTERONE IN MALE: ICD-10-CM

## 2024-05-31 DIAGNOSIS — R53.82 CHRONIC FATIGUE: ICD-10-CM

## 2024-05-31 PROCEDURE — 99204 OFFICE O/P NEW MOD 45 MIN: CPT | Performed by: STUDENT IN AN ORGANIZED HEALTH CARE EDUCATION/TRAINING PROGRAM

## 2024-05-31 NOTE — ASSESSMENT & PLAN NOTE
We reviewed his lab results, testosterone which was checked twice for evaluation of fatigue showed free testosterone of 6.1 and 6.5 pg/mL and total testosterone of 315 and 248 ng/dl, he has intact secondary sexual characteristics, normal libido, no erectile dysfunction, making hypogonadism less likely, we reviewed differential diagnosis for fatigue, insomnia, chronic disease (Parkinson disease), medications are playing a role.  Given inconsistency with his result I would like to repeat testosterone  free and total with equilibrium dialysis, additionally I will check FSH, LH, and prolactin.  His brain MRI showed unremarkable pituitary gland.  Further management pending above results.

## 2024-05-31 NOTE — PROGRESS NOTES
Ralph Broadbent Jr. is a 43 y.o. male who is here for new patient visit for evaluation/ management of low testosterone,      He was evaluated for chronic fatigue for 6-month duration for which testosterone was checked and showed free testosterone of 6.5 pg/mL and total testosterone of 350 ng/dl and repeated resolved in less than a month showed free testosterone of 6.5 pg/mL and total testosterone of 148 ng/dl.      History of Parkinson disease, following up with neurology, currently on Sinemet, entacapone, Remeron.  He is been experiencing insomnia, currently on Atarax.        Puberty:  had a normal puberty started at age 12 -13,  Fathered children No  Shaving normally No     Gonadal ROS:  Fatigue Yes  Change in strength No  Loss of axillary/pubic hairNo  Decreased libidoNo  Decreased erections No  Headaches No  Vision change Yes  Breast development No     Modifying factors:  Risk factors for hypogonadism:  Traumatic brain injury No  Testicular trauma No  Radiation therapy No  Chemotherapy No  Obesity No  Diabetes No  HIV/AIDSNo  Steroid useNo  Narcotic use No     All other systems were reviewed and are negative.     Physical Examination:  Vitals:    05/31/24 1237   BP: 136/70   Pulse: 96   Temp: 98.4 °F (36.9 °C)   SpO2: 98%   Weight : 176 Ib    Physical Exam  Constitutional:       General: He is not in acute distress.     Appearance: Normal appearance. He is not ill-appearing.   HENT:      Head: Normocephalic and atraumatic.   Pulmonary:      Effort: Pulmonary effort is normal. No respiratory distress.   Neurological:      General: No focal deficit present.      Mental Status: He is alert and oriented to person, place, and time.   Psychiatric:         Mood and Affect: Mood normal.         Behavior: Behavior normal.          Male - deferred          Component      Latest Ref Rng 3/18/2024 4/8/2024   TESTOSTERONE FREE      6.8 - 21.5 pg/mL 6.5 (L)  6.1 (L)    Testosterone, Total, LC/MS      264 - 916 ng/dL 315   248 (L)       Legend:  (L) Low  Allergies, medications, past medical, surgical, family and social history were reviewed and are noted in medical record.     Previous records including pertinent laboratory and radiographic results were reviewed and are summarized in the plan below.     ASSESSMENT/PLAN:              Problem List Items Addressed This Visit          Other    Chronic fatigue     See plan for low testosterone.         Relevant Orders    Testosterone, Free/Tot Equilib    Follicle stimulating hormone    Luteinizing hormone    Prolactin    Low testosterone in male     We reviewed his lab results, testosterone which was checked twice for evaluation of fatigue showed free testosterone of 6.1 and 6.5 pg/mL and total testosterone of 315 and 248 ng/dl, he has intact secondary sexual characteristics, normal libido, no erectile dysfunction, making hypogonadism less likely, we reviewed differential diagnosis for fatigue, insomnia, chronic disease (Parkinson disease), medications are playing a role.  Given inconsistency with his result I would like to repeat testosterone  free and total with equilibrium dialysis, additionally I will check FSH, LH, and prolactin.  His brain MRI showed unremarkable pituitary gland.  Further management pending above results.           Relevant Orders    Testosterone, Free/Tot Equilib    Follicle stimulating hormone    Luteinizing hormone    Prolactin       I have spent a total time of 30 minutes on 05/31/24 in caring for this patient including Diagnostic results, Risks and benefits of tx options, Instructions for management, Patient and family education, Impressions, Counseling / Coordination of care, Documenting in the medical record, Reviewing / ordering tests, medicine, procedures  , and Obtaining or reviewing history  .

## 2024-06-03 ENCOUNTER — APPOINTMENT (OUTPATIENT)
Dept: LAB | Facility: CLINIC | Age: 44
End: 2024-06-03
Payer: COMMERCIAL

## 2024-06-03 DIAGNOSIS — R53.82 CHRONIC FATIGUE: ICD-10-CM

## 2024-06-03 DIAGNOSIS — R79.89 LOW TESTOSTERONE IN MALE: ICD-10-CM

## 2024-06-03 LAB
EST. AVERAGE GLUCOSE BLD GHB EST-MCNC: 111 MG/DL
FSH SERPL-ACNC: 6.5 MIU/ML
HBA1C MFR BLD: 5.5 %
LH SERPL-ACNC: 5.4 MIU/ML
PROLACTIN SERPL-MCNC: 13.89 NG/ML

## 2024-06-03 PROCEDURE — 84146 ASSAY OF PROLACTIN: CPT

## 2024-06-03 PROCEDURE — 83002 ASSAY OF GONADOTROPIN (LH): CPT

## 2024-06-03 PROCEDURE — 36415 COLL VENOUS BLD VENIPUNCTURE: CPT

## 2024-06-03 PROCEDURE — 84403 ASSAY OF TOTAL TESTOSTERONE: CPT

## 2024-06-03 PROCEDURE — 83001 ASSAY OF GONADOTROPIN (FSH): CPT

## 2024-06-06 DIAGNOSIS — E78.2 MIXED HYPERLIPIDEMIA: ICD-10-CM

## 2024-06-06 DIAGNOSIS — I10 PRIMARY HYPERTENSION: ICD-10-CM

## 2024-06-06 RX ORDER — ROSUVASTATIN CALCIUM 10 MG/1
10 TABLET, COATED ORAL DAILY
Qty: 90 TABLET | Refills: 1 | Status: SHIPPED | OUTPATIENT
Start: 2024-06-06

## 2024-06-06 RX ORDER — LISINOPRIL 20 MG/1
20 TABLET ORAL DAILY
Qty: 90 TABLET | Refills: 1 | Status: SHIPPED | OUTPATIENT
Start: 2024-06-06

## 2024-06-06 RX ORDER — OMEGA-3-ACID ETHYL ESTERS 1 G/1
2 CAPSULE, LIQUID FILLED ORAL 2 TIMES DAILY
Qty: 360 CAPSULE | Refills: 1 | Status: SHIPPED | OUTPATIENT
Start: 2024-06-06

## 2024-06-07 LAB — MISCELLANEOUS LAB TEST RESULT: NORMAL

## 2024-06-17 DIAGNOSIS — E55.9 VITAMIN D INSUFFICIENCY: ICD-10-CM

## 2024-06-17 RX ORDER — MELATONIN
1000 DAILY
Qty: 90 TABLET | Refills: 1 | Status: SHIPPED | OUTPATIENT
Start: 2024-06-17

## 2024-06-29 DIAGNOSIS — F32.0 CURRENT MILD EPISODE OF MAJOR DEPRESSIVE DISORDER WITHOUT PRIOR EPISODE (HCC): ICD-10-CM

## 2024-07-02 RX ORDER — MIRTAZAPINE 15 MG/1
15 TABLET, FILM COATED ORAL
Qty: 30 TABLET | Refills: 5 | Status: SHIPPED | OUTPATIENT
Start: 2024-07-02

## 2024-07-22 ENCOUNTER — RA CDI HCC (OUTPATIENT)
Dept: OTHER | Facility: HOSPITAL | Age: 44
End: 2024-07-22

## 2024-07-26 ENCOUNTER — OFFICE VISIT (OUTPATIENT)
Dept: FAMILY MEDICINE CLINIC | Facility: CLINIC | Age: 44
End: 2024-07-26
Payer: COMMERCIAL

## 2024-07-26 VITALS
BODY MASS INDEX: 24.74 KG/M2 | HEIGHT: 70 IN | DIASTOLIC BLOOD PRESSURE: 76 MMHG | WEIGHT: 172.8 LBS | SYSTOLIC BLOOD PRESSURE: 128 MMHG | OXYGEN SATURATION: 98 % | TEMPERATURE: 97.8 F | HEART RATE: 105 BPM

## 2024-07-26 DIAGNOSIS — E78.2 MIXED HYPERLIPIDEMIA: ICD-10-CM

## 2024-07-26 DIAGNOSIS — Z79.899 OTHER LONG TERM (CURRENT) DRUG THERAPY: ICD-10-CM

## 2024-07-26 DIAGNOSIS — Z11.59 ENCOUNTER FOR SCREENING FOR OTHER VIRAL DISEASES: ICD-10-CM

## 2024-07-26 DIAGNOSIS — Z00.00 ENCOUNTER FOR ANNUAL WELLNESS VISIT (AWV) IN MEDICARE PATIENT: Primary | ICD-10-CM

## 2024-07-26 DIAGNOSIS — J34.89 MASS OF SINUS: ICD-10-CM

## 2024-07-26 DIAGNOSIS — E53.8 VITAMIN B12 DEFICIENCY: ICD-10-CM

## 2024-07-26 DIAGNOSIS — R63.4 UNINTENDED WEIGHT LOSS: ICD-10-CM

## 2024-07-26 PROCEDURE — G0439 PPPS, SUBSEQ VISIT: HCPCS | Performed by: NURSE PRACTITIONER

## 2024-07-26 PROCEDURE — 99214 OFFICE O/P EST MOD 30 MIN: CPT | Performed by: NURSE PRACTITIONER

## 2024-07-26 NOTE — PROGRESS NOTES
Ambulatory Visit  Name: Ralph Broadbent Jr.      : 1980      MRN: [id number]  Encounter Provider: LESLIE Moura  Encounter Date: 2024   Encounter department: [place] [id number] [address]    Assessment & Plan   1. Encounter for annual wellness visit (AWV) in Medicare patient  2. Unintended weight loss  Comments:  To obtain blood work and CT scan as ordered.  Advised to complete fit test  Orders:  -     CT chest abdomen pelvis w contrast; Future; Expected date: 2024  -     Comprehensive metabolic panel; Future  -     CBC and differential; Future  -     TSH, 3rd generation with Free T4 reflex; Future  -     Hemoglobin A1C; Future  -     Lipid panel; Future  -     Occult Blood, Fecal Immunochemical; Future  -     Chronic Hepatitis Panel; Future  -     HIV 1/2 AG/AB w Reflex [facility] for 2 yr old and above; Future  -     Urinalysis with microscopic; Future  -     C-reactive protein; Future  -     PSA, Total Screen; Future  3. Mass of sinus  Comments:  Will order MRI for further evaluation, refer to ENT.  Orders:  -     MRI brain sinus wo and w contrast; Future; Expected date: 2024  -     Ambulatory Referral to Otolaryngology; Future  4. Mixed hyperlipidemia  Assessment & Plan:  Encouraged heart healthy diet.  Continues on rosuvastatin 10 mg daily.  To obtain blood work as ordered  Orders:  -     Comprehensive metabolic panel; Future  -     Lipid panel; Future  5. Vitamin B12 deficiency  Assessment & Plan:  Advised to obtain repeat blood work since being started on oral B12 supplements  Orders:  -     Vitamin B12; Future  6. Other long term (current) drug therapy  -     Hemoglobin A1C; Future  7. Encounter for screening for other viral diseases  -     Chronic Hepatitis Panel; Future      Depression Screening and Follow-up Plan: Patient's depression screening was positive with a PHQ-9 score of 9. Patient declines further evaluation by mental health professional  and/or medications. Brief counseling provided. Will re-evaluate at next office visit. Patient with underlying depression and was advised to continue current medications as prescribed.       Preventive health issues were discussed with patient, and age appropriate screening tests were ordered as noted in patient's After Visit Summary. Personalized health advice and appropriate referrals for health education or preventive services given if needed, as noted in patient's After Visit Summary.    History of Present Illness     Patient presents to the office accompanied by wife for Medicare wellness visit.  Since last visit, patient was evaluated by endocrinology for low testosterone.  Initiated on oral vitamin B12 supplements.    Patient also had dental work earlier this month.  A CT scan of his sinuses was performed earlier this month by his oral surgeon prior to procedure and was found to have 2 masses on his sinuses.  Patient's recent MRI of brain in May was clear at that time.  Patient denies recent headaches, dizziness, visual disturbances, syncope.  Has observed increased postnasal drip and epistaxis after blowing nose.    Patient also notes unintended weight loss.  Began observing weight loss 4 months ago.  Patient has lost over 10 pounds.  Has not changed his diet or physical activity.  Per patient, he continues to eat the same foods, but is losing weight every week.  Patient denies abnormal bleeding or bruising, dark or bright red stools.  Denies abdominal pain or distention.  His fever, chills, lymphadenopathy.  Family history of lung CA in father.       Patient Care Team:  LESLIE Moura as PCP - General (Family Medicine)  Yancy Miller MD (Endocrinology)    Review of Systems   Constitutional:  Positive for fatigue and unexpected weight change (weight loss). Negative for activity change, appetite change, chills and fever.   HENT:  Positive for postnasal drip. Negative for congestion, ear discharge, facial  swelling, sinus pressure, sinus pain, sore throat and trouble swallowing.    Eyes:  Negative for photophobia and visual disturbance.   Respiratory:  Negative for cough, chest tightness and shortness of breath.    Cardiovascular:  Negative for chest pain and palpitations.   Gastrointestinal:  Negative for abdominal distention, abdominal pain, blood in stool, nausea and vomiting.   Endocrine: Negative for polydipsia, polyphagia and polyuria.   Genitourinary:  Negative for decreased urine volume, dysuria, flank pain, frequency, hematuria, scrotal swelling, testicular pain and urgency.   Musculoskeletal:  Negative for arthralgias, back pain, joint swelling and myalgias.   Skin:  Negative for color change and rash.   Neurological:  Negative for dizziness, speech difficulty, weakness, light-headedness, numbness and headaches.   Hematological:  Negative for adenopathy. Does not bruise/bleed easily.   Psychiatric/Behavioral:  Negative for self-injury and suicidal ideas. The patient is not nervous/anxious.      Medical History Reviewed by provider this encounter:  Tobacco  Allergies  Meds  Problems  Med Hx  Surg Hx  Fam Hx  Soc   Hx      Annual Wellness Visit Questionnaire   Micah is here for his Subsequent Wellness visit.     Health Risk Assessment:   Patient rates overall health as good. Patient feels that their physical health rating is slightly worse. Patient is very satisfied with their life. Eyesight was rated as same. Hearing was rated as same. Patient feels that their emotional and mental health rating is same. Patients states they are never, rarely angry. Patient states they are always unusually tired/fatigued. Pain experienced in the last 7 days has been none. Patient states that he has experienced weight loss or gain in last 6 months. Lost weight without trying    Depression Screening:   PHQ-9 Score: 9      Fall Risk Screening:   In the past year, patient has experienced: no history of falling in past year       Home Safety:  Patient does not have trouble with stairs inside or outside of their home. Patient has working smoke alarms and has working carbon monoxide detector. Home safety hazards include: none.     Nutrition:   Current diet is Regular.     Medications:   Patient is currently taking over-the-counter supplements. OTC medications include: see medication list. Patient is able to manage medications.     Activities of Daily Living (ADLs)/Instrumental Activities of Daily Living (IADLs):   Walk and transfer into and out of bed and chair?: Yes  Dress and groom yourself?: Yes    Bathe or shower yourself?: Yes    Feed yourself? Yes  Do your laundry/housekeeping?: Yes  Manage your money, pay your bills and track your expenses?: Yes  Make your own meals?: Yes    Do your own shopping?: Yes    Previous Hospitalizations:   Any hospitalizations or ED visits within the last 12 months?: No      Advance Care Planning:   Living will: No    Durable POA for healthcare: No    Advanced directive: No      Cognitive Screening:   Provider or family/friend/caregiver concerned regarding cognition?: No    PREVENTIVE SCREENINGS      Cardiovascular Screening:    General: Screening Not Indicated and History Lipid Disorder      Diabetes Screening:     General: Screening Current      Prostate Cancer Screening:    General: Screening Not Indicated      Abdominal Aortic Aneurysm (AAA) Screening:    Risk factors include: tobacco use        Lung Cancer Screening:     General: Screening Not Indicated    Screening, Brief Intervention, and Referral to Treatment (SBIRT)    Screening  Typical number of drinks in a day: 0  Typical number of drinks in a week: 2  Interpretation: Low risk drinking behavior.    AUDIT-C Screenin) How often did you have a drink containing alcohol in the past year? 2 to 4 times a month  2) How many drinks did you have on a typical day when you were drinking in the past year? 1 to 2  3) How often did you have 6 or more  "drinks on one occasion in the past year? never    AUDIT-C Score: 2  Interpretation: Score 0-3 (male): Negative screen for alcohol misuse    Single Item Drug Screening:  How often have you used an illegal drug (including marijuana) or a prescription medication for non-medical reasons in the past year? never    Single Item Drug Screen Score: 0  Interpretation: Negative screen for possible drug use disorder    Other Counseling Topics:   Car/seat belt/driving safety, skin self-exam, sunscreen and calcium and vitamin D intake and regular weightbearing exercise.     Social Determinants of Health     Financial Resource Strain: Low Risk  (7/23/2023)    Overall Financial Resource Strain (CARDIA)     Difficulty of Paying Living Expenses: Not very hard   Food Insecurity: No Food Insecurity (7/26/2024)    Hunger Vital Sign     Worried About Running Out of Food in the Last Year: Never true     Ran Out of Food in the Last Year: Never true   Transportation Needs: No Transportation Needs (7/26/2024)    PRAPARE - Transportation     Lack of Transportation (Medical): No     Lack of Transportation (Non-Medical): No   Housing Stability: Low Risk  (7/26/2024)    Housing Stability Vital Sign     Unable to Pay for Housing in the Last Year: No     Number of Times Moved in the Last Year: 0     Homeless in the Last Year: No   Utilities: Not At Risk (7/26/2024)    [facility] Utilities     Threatened with loss of utilities: No     No results found.    Objective     /76 (BP Location: Left arm, Patient Position: Sitting)   Pulse 105   Temp 97.8 °F (36.6 °C)   Ht 5' 10\" (1.778 m)   Wt 78.4 kg (172 lb 12.8 oz)   SpO2 98%   BMI 24.79 kg/m²     Physical Exam  Vitals reviewed.   Constitutional:       General: He is not in acute distress.     Appearance: Normal appearance. He is not ill-appearing.   HENT:      Head: Normocephalic and atraumatic.      Right Ear: Tympanic membrane, ear canal and external ear normal.      Left Ear: Tympanic membrane, " ear canal and external ear normal.      Nose: Nose normal.      Mouth/Throat:      Mouth: Mucous membranes are moist.      Pharynx: Oropharynx is clear.   Eyes:      Conjunctiva/sclera: Conjunctivae normal.      Pupils: Pupils are equal, round, and reactive to light.   Cardiovascular:      Rate and Rhythm: Normal rate and regular rhythm.      Pulses: Normal pulses.      Heart sounds: Normal heart sounds. No murmur heard.  Pulmonary:      Effort: Pulmonary effort is normal.      Breath sounds: Normal breath sounds.   Abdominal:      General: Bowel sounds are normal.      Palpations: Abdomen is soft. There is no mass.      Tenderness: There is no abdominal tenderness.   Musculoskeletal:         General: Normal range of motion.      Cervical back: Normal range of motion and neck supple.      Right lower leg: No edema.      Left lower leg: No edema.   Lymphadenopathy:      Cervical: No cervical adenopathy.   Skin:     General: Skin is warm and dry.      Capillary Refill: Capillary refill takes less than 2 seconds.   Neurological:      General: No focal deficit present.      Mental Status: He is alert and oriented to person, place, and time.   Psychiatric:         Mood and Affect: Mood normal.         Behavior: Behavior normal.

## 2024-07-26 NOTE — ASSESSMENT & PLAN NOTE
Encouraged heart healthy diet.  Continues on rosuvastatin 10 mg daily.  To obtain blood work as ordered

## 2024-07-26 NOTE — PATIENT INSTRUCTIONS
Medicare Preventive Visit Patient Instructions  Thank you for completing your Welcome to Medicare Visit or Medicare Annual Wellness Visit today. Your next wellness visit will be due in one year (7/27/2025).  The screening/preventive services that you may require over the next 5-10 years are detailed below. Some tests may not apply to you based off risk factors and/or age. Screening tests ordered at today's visit but not completed yet may show as past due. Also, please note that scanned in results may not display below.  Preventive Screenings:  Service Recommendations Previous Testing/Comments   Colorectal Cancer Screening  Colonoscopy    Fecal Occult Blood Test (FOBT)/Fecal Immunochemical Test (FIT)  Fecal DNA/Cologuard Test  Flexible Sigmoidoscopy Age: 45-75 years old   Colonoscopy: every 10 years (May be performed more frequently if at higher risk)  OR  FOBT/FIT: every 1 year  OR  Cologuard: every 3 years  OR  Sigmoidoscopy: every 5 years  Screening may be recommended earlier than age 45 if at higher risk for colorectal cancer. Also, an individualized decision between you and your healthcare provider will decide whether screening between the ages of 76-85 would be appropriate. Colonoscopy: Not on file  FOBT/FIT: Not on file  Cologuard: Not on file  Sigmoidoscopy: Not on file          Prostate Cancer Screening Individualized decision between patient and health care provider in men between ages of 55-69   Medicare will cover every 12 months beginning on the day after your 50th birthday PSA: No results in last 5 years           Hepatitis C Screening Once for adults born between 1945 and 1965  More frequently in patients at high risk for Hepatitis C Hep C Antibody: Not on file        Diabetes Screening 1-2 times per year if you're at risk for diabetes or have pre-diabetes Fasting glucose: 105 mg/dL (3/18/2024)  A1C: 5.5 % (6/3/2024)      Cholesterol Screening Once every 5 years if you don't have a lipid disorder. May  order more often based on risk factors. Lipid panel: 03/18/2024         Other Preventive Screenings Covered by Medicare:  Abdominal Aortic Aneurysm (AAA) Screening: covered once if your at risk. You're considered to be at risk if you have a family history of AAA or a male between the age of 65-75 who smoking at least 100 cigarettes in your lifetime.  Lung Cancer Screening: covers low dose CT scan once per year if you meet all of the following conditions: (1) Age 55-77; (2) No signs or symptoms of lung cancer; (3) Current smoker or have quit smoking within the last 15 years; (4) You have a tobacco smoking history of at least 20 pack years (packs per day x number of years you smoked); (5) You get a written order from a healthcare provider.  Glaucoma Screening: covered annually if you're considered high risk: (1) You have diabetes OR (2) Family history of glaucoma OR (3)  aged 50 and older OR (4)  American aged 65 and older  Osteoporosis Screening: covered every 2 years if you meet one of the following conditions: (1) Have a vertebral abnormality; (2) On glucocorticoid therapy for more than 3 months; (3) Have primary hyperparathyroidism; (4) On osteoporosis medications and need to assess response to drug therapy.  HIV Screening: covered annually if you're between the age of 15-65. Also covered annually if you are younger than 15 and older than 65 with risk factors for HIV infection. For pregnant patients, it is covered up to 3 times per pregnancy.    Immunizations:  Immunization Recommendations   Influenza Vaccine Annual influenza vaccination during flu season is recommended for all persons aged >= 6 months who do not have contraindications   Pneumococcal Vaccine   * Pneumococcal conjugate vaccine = PCV13 (Prevnar 13), PCV15 (Vaxneuvance), PCV20 (Prevnar 20)  * Pneumococcal polysaccharide vaccine = PPSV23 (Pneumovax) Adults 19-65 yo with certain risk factors or if 65+ yo  If never received any  pneumonia vaccine: recommend Prevnar 20 (PCV20)  Give PCV20 if previously received 1 dose of PCV13 or PPSV23   Hepatitis B Vaccine 3 dose series if at intermediate or high risk (ex: diabetes, end stage renal disease, liver disease)   Respiratory syncytial virus (RSV) Vaccine - COVERED BY MEDICARE PART D  * RSVPreF3 (Arexvy) CDC recommends that adults 60 years of age and older may receive a single dose of RSV vaccine using shared clinical decision-making (SCDM)   Tetanus (Td) Vaccine - COST NOT COVERED BY MEDICARE PART B Following completion of primary series, a booster dose should be given every 10 years to maintain immunity against tetanus. Td may also be given as tetanus wound prophylaxis.   Tdap Vaccine - COST NOT COVERED BY MEDICARE PART B Recommended at least once for all adults. For pregnant patients, recommended with each pregnancy.   Shingles Vaccine (Shingrix) - COST NOT COVERED BY MEDICARE PART B  2 shot series recommended in those 19 years and older who have or will have weakened immune systems or those 50 years and older     Health Maintenance Due:      Topic Date Due   • Hepatitis C Screening  Never done   • HIV Screening  Completed     Immunizations Due:      Topic Date Due   • COVID-19 Vaccine (1 - 2023-24 season) Never done   • Influenza Vaccine (1) 09/01/2024     Advance Directives   What are advance directives?  Advance directives are legal documents that state your wishes and plans for medical care. These plans are made ahead of time in case you lose your ability to make decisions for yourself. Advance directives can apply to any medical decision, such as the treatments you want, and if you want to donate organs.   What are the types of advance directives?  There are many types of advance directives, and each state has rules about how to use them. You may choose a combination of any of the following:  Living will:  This is a written record of the treatment you want. You can also choose which  treatments you do not want, which to limit, and which to stop at a certain time. This includes surgery, medicine, IV fluid, and tube feedings.   Durable power of  for healthcare (DPAHC):  This is a written record that states who you want to make healthcare choices for you when you are unable to make them for yourself. This person, called a proxy, is usually a family member or a friend. You may choose more than 1 proxy.  Do not resuscitate (DNR) order:  A DNR order is used in case your heart stops beating or you stop breathing. It is a request not to have certain forms of treatment, such as CPR. A DNR order may be included in other types of advance directives.  Medical directive:  This covers the care that you want if you are in a coma, near death, or unable to make decisions for yourself. You can list the treatments you want for each condition. Treatment may include pain medicine, surgery, blood transfusions, dialysis, IV or tube feedings, and a ventilator (breathing machine).  Values history:  This document has questions about your views, beliefs, and how you feel and think about life. This information can help others choose the care that you would choose.  Why are advance directives important?  An advance directive helps you control your care. Although spoken wishes may be used, it is better to have your wishes written down. Spoken wishes can be misunderstood, or not followed. Treatments may be given even if you do not want them. An advance directive may make it easier for your family to make difficult choices about your care.       © Copyright [employer] 2018 Information is for End User's use only and may not be sold, redistributed or otherwise used for commercial purposes. All illustrations and images included in CareNotes® are the copyrighted property of [email or web address] or [email or web address]

## 2024-07-31 ENCOUNTER — APPOINTMENT (OUTPATIENT)
Dept: LAB | Facility: CLINIC | Age: 44
End: 2024-07-31
Payer: COMMERCIAL

## 2024-07-31 DIAGNOSIS — Z79.899 OTHER LONG TERM (CURRENT) DRUG THERAPY: ICD-10-CM

## 2024-07-31 DIAGNOSIS — E78.2 MIXED HYPERLIPIDEMIA: ICD-10-CM

## 2024-07-31 DIAGNOSIS — R63.4 UNINTENDED WEIGHT LOSS: ICD-10-CM

## 2024-07-31 DIAGNOSIS — E53.8 VITAMIN B12 DEFICIENCY: ICD-10-CM

## 2024-07-31 DIAGNOSIS — Z11.59 ENCOUNTER FOR SCREENING FOR OTHER VIRAL DISEASES: ICD-10-CM

## 2024-07-31 LAB
ALBUMIN SERPL BCG-MCNC: 4.3 G/DL (ref 3.5–5)
ALP SERPL-CCNC: 48 U/L (ref 34–104)
ALT SERPL W P-5'-P-CCNC: 14 U/L (ref 7–52)
AMORPH URATE CRY URNS QL MICRO: ABNORMAL
ANION GAP SERPL CALCULATED.3IONS-SCNC: 11 MMOL/L (ref 4–13)
AST SERPL W P-5'-P-CCNC: 18 U/L (ref 13–39)
BACTERIA UR QL AUTO: ABNORMAL /HPF
BASOPHILS # BLD AUTO: 0.03 THOUSANDS/ÂΜL (ref 0–0.1)
BASOPHILS NFR BLD AUTO: 1 % (ref 0–1)
BILIRUB SERPL-MCNC: 0.5 MG/DL (ref 0.2–1)
BILIRUB UR QL STRIP: NEGATIVE
BUN SERPL-MCNC: 20 MG/DL (ref 5–25)
CALCIUM SERPL-MCNC: 9.7 MG/DL (ref 8.4–10.2)
CHLORIDE SERPL-SCNC: 104 MMOL/L (ref 96–108)
CHOLEST SERPL-MCNC: 132 MG/DL
CLARITY UR: ABNORMAL
CO2 SERPL-SCNC: 24 MMOL/L (ref 21–32)
COLOR UR: ABNORMAL
CREAT SERPL-MCNC: 1.03 MG/DL (ref 0.6–1.3)
CRP SERPL QL: <1 MG/L
EOSINOPHIL # BLD AUTO: 0.18 THOUSAND/ÂΜL (ref 0–0.61)
EOSINOPHIL NFR BLD AUTO: 5 % (ref 0–6)
ERYTHROCYTE [DISTWIDTH] IN BLOOD BY AUTOMATED COUNT: 11.9 % (ref 11.6–15.1)
EST. AVERAGE GLUCOSE BLD GHB EST-MCNC: 114 MG/DL
GFR SERPL CREATININE-BSD FRML MDRD: 89 ML/MIN/1.73SQ M
GLUCOSE P FAST SERPL-MCNC: 92 MG/DL (ref 65–99)
GLUCOSE UR STRIP-MCNC: NEGATIVE MG/DL
HBA1C MFR BLD: 5.6 %
HBV CORE AB SER QL: NORMAL
HBV CORE IGM SER QL: NORMAL
HBV SURFACE AG SER QL: NORMAL
HCT VFR BLD AUTO: 45.4 % (ref 36.5–49.3)
HCV AB SER QL: NORMAL
HDLC SERPL-MCNC: 42 MG/DL
HGB BLD-MCNC: 14.8 G/DL (ref 12–17)
HGB UR QL STRIP.AUTO: ABNORMAL
HIV 1+2 AB+HIV1 P24 AG SERPL QL IA: NORMAL
HIV 2 AB SERPL QL IA: NORMAL
HIV1 AB SERPL QL IA: NORMAL
HIV1 P24 AG SERPL QL IA: NORMAL
IMM GRANULOCYTES # BLD AUTO: 0.01 THOUSAND/UL (ref 0–0.2)
IMM GRANULOCYTES NFR BLD AUTO: 0 % (ref 0–2)
KETONES UR STRIP-MCNC: NEGATIVE MG/DL
LDLC SERPL CALC-MCNC: 64 MG/DL (ref 0–100)
LEUKOCYTE ESTERASE UR QL STRIP: ABNORMAL
LYMPHOCYTES # BLD AUTO: 1.3 THOUSANDS/ÂΜL (ref 0.6–4.47)
LYMPHOCYTES NFR BLD AUTO: 35 % (ref 14–44)
MCH RBC QN AUTO: 29.8 PG (ref 26.8–34.3)
MCHC RBC AUTO-ENTMCNC: 32.6 G/DL (ref 31.4–37.4)
MCV RBC AUTO: 92 FL (ref 82–98)
MONOCYTES # BLD AUTO: 0.44 THOUSAND/ÂΜL (ref 0.17–1.22)
MONOCYTES NFR BLD AUTO: 12 % (ref 4–12)
MUCOUS THREADS UR QL AUTO: ABNORMAL
NEUTROPHILS # BLD AUTO: 1.77 THOUSANDS/ÂΜL (ref 1.85–7.62)
NEUTS SEG NFR BLD AUTO: 47 % (ref 43–75)
NITRITE UR QL STRIP: NEGATIVE
NON-SQ EPI CELLS URNS QL MICRO: ABNORMAL /HPF
NONHDLC SERPL-MCNC: 90 MG/DL
NRBC BLD AUTO-RTO: 0 /100 WBCS
PH UR STRIP.AUTO: 6 [PH]
PLATELET # BLD AUTO: 240 THOUSANDS/UL (ref 149–390)
PMV BLD AUTO: 10.2 FL (ref 8.9–12.7)
POTASSIUM SERPL-SCNC: 4.2 MMOL/L (ref 3.5–5.3)
PROT SERPL-MCNC: 6.8 G/DL (ref 6.4–8.4)
PROT UR STRIP-MCNC: ABNORMAL MG/DL
PSA SERPL-MCNC: 0.78 NG/ML (ref 0–4)
RBC # BLD AUTO: 4.96 MILLION/UL (ref 3.88–5.62)
RBC #/AREA URNS AUTO: ABNORMAL /HPF
SODIUM SERPL-SCNC: 139 MMOL/L (ref 135–147)
SP GR UR STRIP.AUTO: 1.03 (ref 1–1.03)
TRIGL SERPL-MCNC: 130 MG/DL
TSH SERPL DL<=0.05 MIU/L-ACNC: 1.65 UIU/ML (ref 0.45–4.5)
UROBILINOGEN UR STRIP-ACNC: <2 MG/DL
VIT B12 SERPL-MCNC: 1047 PG/ML (ref 180–914)
WBC # BLD AUTO: 3.73 THOUSAND/UL (ref 4.31–10.16)
WBC #/AREA URNS AUTO: ABNORMAL /HPF

## 2024-07-31 PROCEDURE — G0103 PSA SCREENING: HCPCS

## 2024-07-31 PROCEDURE — 84443 ASSAY THYROID STIM HORMONE: CPT

## 2024-07-31 PROCEDURE — 86803 HEPATITIS C AB TEST: CPT

## 2024-07-31 PROCEDURE — 80053 COMPREHEN METABOLIC PANEL: CPT

## 2024-07-31 PROCEDURE — 85025 COMPLETE CBC W/AUTO DIFF WBC: CPT

## 2024-07-31 PROCEDURE — 87340 HEPATITIS B SURFACE AG IA: CPT

## 2024-07-31 PROCEDURE — 86140 C-REACTIVE PROTEIN: CPT

## 2024-07-31 PROCEDURE — 82607 VITAMIN B-12: CPT

## 2024-07-31 PROCEDURE — 80061 LIPID PANEL: CPT

## 2024-07-31 PROCEDURE — 87389 HIV-1 AG W/HIV-1&-2 AB AG IA: CPT

## 2024-07-31 PROCEDURE — 86705 HEP B CORE ANTIBODY IGM: CPT

## 2024-07-31 PROCEDURE — 83036 HEMOGLOBIN GLYCOSYLATED A1C: CPT

## 2024-07-31 PROCEDURE — 36415 COLL VENOUS BLD VENIPUNCTURE: CPT

## 2024-07-31 PROCEDURE — 86704 HEP B CORE ANTIBODY TOTAL: CPT

## 2024-07-31 PROCEDURE — 81001 URINALYSIS AUTO W/SCOPE: CPT

## 2024-08-01 DIAGNOSIS — R82.90 ABNORMAL URINALYSIS: Primary | ICD-10-CM

## 2024-08-01 DIAGNOSIS — R63.4 UNINTENDED WEIGHT LOSS: ICD-10-CM

## 2024-08-01 DIAGNOSIS — D72.819 LEUKOPENIA, UNSPECIFIED TYPE: ICD-10-CM

## 2024-08-02 ENCOUNTER — HOSPITAL ENCOUNTER (OUTPATIENT)
Dept: CT IMAGING | Facility: CLINIC | Age: 44
Discharge: HOME | End: 2024-08-02
Payer: COMMERCIAL

## 2024-08-02 ENCOUNTER — TELEPHONE (OUTPATIENT)
Dept: FAMILY MEDICINE CLINIC | Facility: CLINIC | Age: 44
End: 2024-08-02

## 2024-08-02 DIAGNOSIS — R63.4 UNINTENDED WEIGHT LOSS: ICD-10-CM

## 2024-08-02 PROCEDURE — 71260 CT THORAX DX C+: CPT

## 2024-08-02 PROCEDURE — 74177 CT ABD & PELVIS W/CONTRAST: CPT

## 2024-08-02 RX ADMIN — IOHEXOL 100 ML: 350 INJECTION, SOLUTION INTRAVENOUS at 09:37

## 2024-08-02 NOTE — TELEPHONE ENCOUNTER
----- Message from LESLIE Gonzalez sent at 8/1/2024  5:19 PM EDT -----  Please inform patient that his vitamin B12 is elevated, will hold off on any B12 supplements at this time.  His urine sample does show some small leukocytes, and protein.  Has he been having any urinary symptoms?  If not, I would like to repeat the U/A and culture in 2 weeks.  Please inform to be hydrated prior to study.  A repeat order has been placed in his chart.  His white blood cell count has decreased, so with his symptoms of unexplained weight loss, I placed a referral to hematology for an evaluation.  Please provide number to patient if needed.  Thank you

## 2024-08-05 ENCOUNTER — APPOINTMENT (OUTPATIENT)
Dept: LAB | Facility: CLINIC | Age: 44
End: 2024-08-05
Payer: COMMERCIAL

## 2024-08-05 DIAGNOSIS — R63.4 UNINTENDED WEIGHT LOSS: ICD-10-CM

## 2024-08-05 LAB — HEMOCCULT STL QL IA: NEGATIVE

## 2024-08-05 PROCEDURE — G0328 FECAL BLOOD SCRN IMMUNOASSAY: HCPCS

## 2024-08-28 ENCOUNTER — OFFICE VISIT (OUTPATIENT)
Dept: NEUROLOGY | Facility: CLINIC | Age: 44
End: 2024-08-28
Payer: COMMERCIAL

## 2024-08-28 VITALS
HEART RATE: 108 BPM | DIASTOLIC BLOOD PRESSURE: 74 MMHG | WEIGHT: 173.2 LBS | SYSTOLIC BLOOD PRESSURE: 130 MMHG | BODY MASS INDEX: 24.85 KG/M2

## 2024-08-28 DIAGNOSIS — G20.A1 PARKINSON'S DISEASE: Primary | Chronic | ICD-10-CM

## 2024-08-28 DIAGNOSIS — G47.9 SLEEP DISTURBANCE: ICD-10-CM

## 2024-08-28 PROCEDURE — 99214 OFFICE O/P EST MOD 30 MIN: CPT | Performed by: PSYCHIATRY & NEUROLOGY

## 2024-08-28 RX ORDER — RAMELTEON 8 MG/1
8 TABLET ORAL
Qty: 30 TABLET | Refills: 0 | Status: SHIPPED | OUTPATIENT
Start: 2024-08-28 | End: 2024-09-27

## 2024-08-28 NOTE — ASSESSMENT & PLAN NOTE
Assessment:  43-year-old male that presented to the movement disorder clinic for follow-up in regards to Parkinson disease with Dr. Dodd.  The patient is currently maintained on carbidopa levodopa 1 tablet every 6 hours, and entacapone 200 mg 1 tablet every 6 hours.  The patient reports that these agents are currently keeping him status quo, patient reported continued overall stiffness and attributes this to both parkinsonian symptoms and that of known arthritis.  The patient continues to report sleep difficulty and after discussion with attending and patient, it was decided to initiate ramelteon instead of hydroxyzine.  The patient is to contact office and requested an update on how this medicine is working in regards to sleep.  The patient will also follow-up with hematology in September 2024 for continued evaluation of slow decline in white blood cell count numbers.     Physical examination demonstrates a flat affect, decreased blinking rate, bradykinesia, cogwheel rigidity in bilateral upper and lower extremities with decreased finger tapping and foot tapping.  Patient was able to stand up from seated position, demonstrated a gait pattern with mildly shortened stride length, and decreased arm swing bilaterally, however turning was closer to normal with no overt en bloc turning.    Plan:  - Case discussed with attending neurologist Dr. Cochran  - Continue with Carbidopa-Levodopa 25/100 1 tablet by mouth every 6 hours  - Continue with Entacapone 200mg by mouth once every 6 hours  - Continue with physical therapy  - Start taking Ramelteon 8mg by mouth once per day at bedtime  - STOP taking hydroxyzine  - Please call office for any questions or concerns  - Please follow up with Dr. Cochran in 5 months.

## 2024-08-28 NOTE — PATIENT INSTRUCTIONS
- Continue with Carbidopa-Levodopa 25/100 1 tablet by mouth every 6 hours  - Continue with Entacapone 200mg by mouth once every 6 hours  - Continue with physical therapy  - Start taking Ramelteon 8mg by mouth once per day at bedtime  - STOP taking hydroxyzine  - Please call office for any questions or concerns  - Please follow up with Dr. Cochran in 5 months.

## 2024-08-28 NOTE — PROGRESS NOTES
Patient ID: Ralph Broadbent Jr. is a 43 y.o. male.    Assessment/Plan:  Parkinson's disease  Assessment:  43-year-old male that presented to the movement disorder clinic for follow-up in regards to Parkinson disease with Dr. Dodd.  The patient is currently maintained on carbidopa levodopa 1 tablet every 6 hours, and entacapone 200 mg 1 tablet every 6 hours.  The patient reports that these agents are currently keeping him status quo, patient reported continued overall stiffness and attributes this to both parkinsonian symptoms and that of known arthritis.  The patient continues to report sleep difficulty and after discussion with attending and patient, it was decided to initiate ramelteon instead of hydroxyzine.  The patient is to contact office and requested an update on how this medicine is working in regards to sleep.  The patient will also follow-up with hematology in September 2024 for continued evaluation of slow decline in white blood cell count numbers.     Physical examination demonstrates a flat affect, decreased blinking rate, bradykinesia, cogwheel rigidity in bilateral upper and lower extremities with decreased finger tapping and foot tapping.  Patient was able to stand up from seated position, demonstrated a gait pattern with mildly shortened stride length, and decreased arm swing bilaterally, however turning was closer to normal with no overt en bloc turning.    Plan:  - Case discussed with attending neurologist Dr. Cochran  - Continue with Carbidopa-Levodopa 25/100 1 tablet by mouth every 6 hours  - Continue with Entacapone 200mg by mouth once every 6 hours  - Continue with physical therapy  - Start taking Ramelteon 8mg by mouth once per day at bedtime  - STOP taking hydroxyzine  - Please call office for any questions or concerns  - Please follow up with Dr. Cochran in 5 months.       Diagnoses and all orders for this visit:    Parkinson's disease  -     ramelteon (ROZEREM) 8 mg tablet; Take 1 tablet (8 mg  "total) by mouth daily at bedtime    Sleep disturbance  -     ramelteon (ROZEREM) 8 mg tablet; Take 1 tablet (8 mg total) by mouth daily at bedtime         Subjective:  The patient reported that since the last office visit that he has been about the same. The patients partner at bedside reported that the patient is in a lot of pain (to where she reported that when she will hug her partner that there would be a sense of pain and discomfort), the patient reported that there is more of a sense of stiffness and then the patient also reported hat there is a know history of pain in the back and arthritis. The patient reported that his current medication regiment of Sinemet and Entacapone is still showing some benefit in general. The patient reported that his sleep is \"fine\", the patient reported that he attempted to get off atarax (patient reported that in the past he attempted melatonin and also trazodone however theses were not effective for sleep induction and reported that if he does wake up that he does have trouble falling asleep again.) The patient reported that there is still difficulty with his gait and reported that he would stumble however would not fall, and did not reported nay LOC events. The patient denied any recent new hospitalization, changes in medications, surgeries, or any new illnesses. The patients partner at bedside also reported that the patient will be seeing a haematologist starting in September due to concern of losing weight unintentionally and that there has been a gradual decrease in WBC.       The following portions of the patient's history were reviewed and updated as appropriate: allergies, current medications, past family history, past medical history, past social history, past surgical history, and problem list.    Objective:  Blood pressure 130/74, pulse (!) 108, weight 78.6 kg (173 lb 3.2 oz).    Physical Exam  Vitals and nursing note reviewed.   Constitutional:       General: He is " awake.   HENT:      Head: Normocephalic.      Right Ear: Hearing normal.      Left Ear: Hearing normal.      Nose: Nose normal.   Eyes:      General: Lids are normal.         Right eye: No discharge.         Left eye: No discharge.      Extraocular Movements: Extraocular movements intact.      Pupils: Pupils are equal, round, and reactive to light.   Cardiovascular:      Rate and Rhythm: Tachycardia present.   Pulmonary:      Effort: No respiratory distress.   Abdominal:      General: There is no distension.   Musculoskeletal:      Right lower leg: No edema.      Left lower leg: No edema.   Skin:     General: Skin is warm.      Coloration: Skin is not jaundiced or pale.   Neurological:      Mental Status: He is alert.      Motor: Motor strength is normal.     Gait: Gait is intact.      Deep Tendon Reflexes:      Reflex Scores:       Tricep reflexes are 2+ on the right side and 2+ on the left side.       Bicep reflexes are 2+ on the right side and 2+ on the left side.       Brachioradialis reflexes are 2+ on the right side and 2+ on the left side.       Patellar reflexes are 2+ on the right side and 2+ on the left side.       Achilles reflexes are 2+ on the right side and 2+ on the left side.  Psychiatric:         Speech: Speech normal.       Neurological Exam  Mental Status  Awake and alert. Speech is normal.    Cranial Nerves  CN II: Right visual acuity: Counts fingers. Left visual acuity: Counts fingers. Right normal visual field. Left normal visual field. Right funduscopic exam: not visualized. Left funduscopic exam: not visualized.  CN III, IV, VI: Extraocular movements intact bilaterally. Normal lids and orbits bilaterally. Pupils equal round and reactive to light bilaterally.  CN V:  Right: Facial sensation is normal.  Left: Facial sensation is normal on the left.  CN VII:  Right: There is no facial weakness.  Left: There is no facial weakness.  CN VIII:  Right: Hearing is normal.  Left: Hearing is normal.  CN  IX, X: Palate elevates symmetrically  CN XI:  Right: Sternocleidomastoid strength is normal. Trapezius strength is normal.  Left: Sternocleidomastoid strength is normal. Trapezius strength is normal.  CN XII: Tongue midline without atrophy or fasciculations.  - Blunted affect, decreased blinking rate.    Motor  Normal muscle bulk throughout. No fasciculations present. Increased muscle tone. The following abnormal movements were seen: Strength is 5/5 throughout all four extremities.  - Tremor present in bilateral upper extremities.   - Bradykinesia in bilateral UE/LE  - Cogwheel rigidity present in bilateral UE/LE  - Interruption of smooth tapping of fingers and tapping of feet (stomping of foot) decreased bilateral UE/LE  - Dysdiadochokinesia present with the RUE on the LUE.    Sensory  Light touch is normal in upper and lower extremities.     Reflexes                                            Right                      Left  Brachioradialis                    2+                         2+  Biceps                                 2+                         2+  Triceps                                2+                         2+  Patellar                                2+                         2+  Achilles                                2+                         2+    Right pathological reflexes: Myesha's absent. Crossed adductor absent. Ankle clonus absent.  Left pathological reflexes: Myesha's absent. Crossed adductor absent. Ankle clonus absent.    Coordination  Right: Finger-to-nose abnormality: Rapid alternating movement abnormality: Heel-to-shin abnormality:Left: Finger-to-nose abnormality: Rapid alternating movement abnormality: Heel-to-shin abnormality:  HTS/FTN interrupted by tremor.    Gait  Casual gait: Wide stance. Reduced stride length. Normal gait. Reduced right arm swing. Reduced left arm swing. Tandem gait abnormality: Able to rise from chair without using arms.  - Slight unsteadiness  on  tandem gait.    ROS:  Review of Systems   Constitutional:  Positive for fatigue. Negative for appetite change and fever.   HENT: Negative.  Negative for hearing loss, tinnitus, trouble swallowing and voice change.    Eyes: Negative.  Negative for photophobia, pain and visual disturbance.   Respiratory: Negative.  Negative for shortness of breath.    Cardiovascular: Negative.  Negative for palpitations.   Gastrointestinal: Negative.  Negative for nausea and vomiting.   Endocrine: Negative.  Negative for cold intolerance.   Genitourinary: Negative.  Negative for dysuria, frequency and urgency.   Musculoskeletal:  Positive for gait problem (Stumbles, has stayed the same), myalgias (At times in Legs and Neck) and neck stiffness. Negative for back pain and neck pain.   Skin: Negative.  Negative for rash.   Allergic/Immunologic: Negative.    Neurological:  Positive for dizziness (At times) and tremors (Hands, Has stayed about the same). Negative for seizures, syncope, facial asymmetry, speech difficulty, weakness, light-headedness, numbness and headaches.   Hematological: Negative.  Does not bruise/bleed easily.   Psychiatric/Behavioral: Negative.  Negative for confusion, hallucinations and sleep disturbance.    All other systems reviewed and are negative.

## 2024-08-28 NOTE — PROGRESS NOTES
Review of Systems   Constitutional:  Positive for fatigue. Negative for appetite change and fever.   HENT: Negative.  Negative for hearing loss, tinnitus, trouble swallowing and voice change.    Eyes: Negative.  Negative for photophobia, pain and visual disturbance.   Respiratory: Negative.  Negative for shortness of breath.    Cardiovascular: Negative.  Negative for palpitations.   Gastrointestinal: Negative.  Negative for nausea and vomiting.   Endocrine: Negative.  Negative for cold intolerance.   Genitourinary: Negative.  Negative for dysuria, frequency and urgency.   Musculoskeletal:  Positive for gait problem (Stumbles, has stayed the same), myalgias (At times in Legs and Neck) and neck stiffness. Negative for back pain and neck pain.   Skin: Negative.  Negative for rash.   Allergic/Immunologic: Negative.    Neurological:  Positive for dizziness (At times) and tremors (Hands, Has stayed about the same). Negative for seizures, syncope, facial asymmetry, speech difficulty, weakness, light-headedness, numbness and headaches.   Hematological: Negative.  Does not bruise/bleed easily.   Psychiatric/Behavioral: Negative.  Negative for confusion, hallucinations and sleep disturbance.    All other systems reviewed and are negative.

## 2024-09-09 ENCOUNTER — HOSPITAL ENCOUNTER (OUTPATIENT)
Dept: MRI IMAGING | Facility: CLINIC | Age: 44
Discharge: HOME | End: 2024-09-09
Payer: COMMERCIAL

## 2024-09-09 DIAGNOSIS — J34.89 MASS OF SINUS: ICD-10-CM

## 2024-09-09 PROCEDURE — 70553 MRI BRAIN STEM W/O & W/DYE: CPT

## 2024-09-09 PROCEDURE — A9585 GADOBUTROL INJECTION: HCPCS | Performed by: NURSE PRACTITIONER

## 2024-09-09 RX ORDER — GADOBUTROL 604.72 MG/ML
7 INJECTION INTRAVENOUS
Status: COMPLETED | OUTPATIENT
Start: 2024-09-09 | End: 2024-09-09

## 2024-09-09 RX ADMIN — GADOBUTROL 7 ML: 604.72 INJECTION INTRAVENOUS at 14:14

## 2024-09-12 DIAGNOSIS — J34.89 MASS OF SINUS: Primary | ICD-10-CM

## 2024-09-19 ENCOUNTER — APPOINTMENT (OUTPATIENT)
Dept: LAB | Facility: CLINIC | Age: 44
End: 2024-09-19
Payer: COMMERCIAL

## 2024-09-19 ENCOUNTER — OFFICE VISIT (OUTPATIENT)
Dept: HEMATOLOGY ONCOLOGY | Facility: CLINIC | Age: 44
End: 2024-09-19
Payer: COMMERCIAL

## 2024-09-19 VITALS
DIASTOLIC BLOOD PRESSURE: 82 MMHG | HEIGHT: 70 IN | OXYGEN SATURATION: 99 % | SYSTOLIC BLOOD PRESSURE: 124 MMHG | WEIGHT: 172 LBS | RESPIRATION RATE: 16 BRPM | HEART RATE: 108 BPM | BODY MASS INDEX: 24.62 KG/M2 | TEMPERATURE: 97.9 F

## 2024-09-19 DIAGNOSIS — D72.819 LEUKOPENIA, UNSPECIFIED TYPE: Primary | ICD-10-CM

## 2024-09-19 DIAGNOSIS — R13.10 DYSPHAGIA, UNSPECIFIED TYPE: ICD-10-CM

## 2024-09-19 DIAGNOSIS — R63.4 UNINTENDED WEIGHT LOSS: ICD-10-CM

## 2024-09-19 DIAGNOSIS — D72.819 LEUKOPENIA, UNSPECIFIED TYPE: ICD-10-CM

## 2024-09-19 DIAGNOSIS — R82.90 ABNORMAL URINALYSIS: ICD-10-CM

## 2024-09-19 LAB
ALBUMIN SERPL BCG-MCNC: 5 G/DL (ref 3.5–5)
ALP SERPL-CCNC: 55 U/L (ref 34–104)
ALT SERPL W P-5'-P-CCNC: 19 U/L (ref 7–52)
ANION GAP SERPL CALCULATED.3IONS-SCNC: 8 MMOL/L (ref 4–13)
AST SERPL W P-5'-P-CCNC: 18 U/L (ref 13–39)
BACTERIA UR QL AUTO: ABNORMAL /HPF
BASOPHILS # BLD AUTO: 0.03 THOUSANDS/ΜL (ref 0–0.1)
BASOPHILS NFR BLD AUTO: 1 % (ref 0–1)
BILIRUB SERPL-MCNC: 0.54 MG/DL (ref 0.2–1)
BILIRUB UR QL STRIP: NEGATIVE
BUN SERPL-MCNC: 22 MG/DL (ref 5–25)
CALCIUM SERPL-MCNC: 9.9 MG/DL (ref 8.4–10.2)
CHLORIDE SERPL-SCNC: 103 MMOL/L (ref 96–108)
CLARITY UR: CLEAR
CO2 SERPL-SCNC: 28 MMOL/L (ref 21–32)
COLOR UR: YELLOW
CREAT SERPL-MCNC: 0.92 MG/DL (ref 0.6–1.3)
CRP SERPL QL: <1 MG/L
EOSINOPHIL # BLD AUTO: 0.08 THOUSAND/ΜL (ref 0–0.61)
EOSINOPHIL NFR BLD AUTO: 2 % (ref 0–6)
ERYTHROCYTE [DISTWIDTH] IN BLOOD BY AUTOMATED COUNT: 11.8 % (ref 11.6–15.1)
GFR SERPL CREATININE-BSD FRML MDRD: 102 ML/MIN/1.73SQ M
GLUCOSE SERPL-MCNC: 92 MG/DL (ref 65–140)
GLUCOSE UR STRIP-MCNC: NEGATIVE MG/DL
HCT VFR BLD AUTO: 46.4 % (ref 36.5–49.3)
HGB BLD-MCNC: 15.5 G/DL (ref 12–17)
HGB UR QL STRIP.AUTO: NEGATIVE
IMM GRANULOCYTES # BLD AUTO: 0.01 THOUSAND/UL (ref 0–0.2)
IMM GRANULOCYTES NFR BLD AUTO: 0 % (ref 0–2)
KETONES UR STRIP-MCNC: ABNORMAL MG/DL
LEUKOCYTE ESTERASE UR QL STRIP: NEGATIVE
LYMPHOCYTES # BLD AUTO: 0.91 THOUSANDS/ΜL (ref 0.6–4.47)
LYMPHOCYTES NFR BLD AUTO: 18 % (ref 14–44)
MCH RBC QN AUTO: 30.2 PG (ref 26.8–34.3)
MCHC RBC AUTO-ENTMCNC: 33.4 G/DL (ref 31.4–37.4)
MCV RBC AUTO: 90 FL (ref 82–98)
MONOCYTES # BLD AUTO: 0.38 THOUSAND/ΜL (ref 0.17–1.22)
MONOCYTES NFR BLD AUTO: 8 % (ref 4–12)
MUCOUS THREADS UR QL AUTO: ABNORMAL
NEUTROPHILS # BLD AUTO: 3.69 THOUSANDS/ΜL (ref 1.85–7.62)
NEUTS SEG NFR BLD AUTO: 71 % (ref 43–75)
NITRITE UR QL STRIP: NEGATIVE
NON-SQ EPI CELLS URNS QL MICRO: ABNORMAL /HPF
NRBC BLD AUTO-RTO: 0 /100 WBCS
PH UR STRIP.AUTO: 7.5 [PH]
PLATELET # BLD AUTO: 217 THOUSANDS/UL (ref 149–390)
PMV BLD AUTO: 10.6 FL (ref 8.9–12.7)
POTASSIUM SERPL-SCNC: 4.2 MMOL/L (ref 3.5–5.3)
PROT SERPL-MCNC: 7.4 G/DL (ref 6.4–8.4)
PROT UR STRIP-MCNC: ABNORMAL MG/DL
RBC # BLD AUTO: 5.14 MILLION/UL (ref 3.88–5.62)
RBC #/AREA URNS AUTO: ABNORMAL /HPF
SODIUM SERPL-SCNC: 139 MMOL/L (ref 135–147)
SP GR UR STRIP.AUTO: 1.03 (ref 1–1.03)
UROBILINOGEN UR STRIP-ACNC: <2 MG/DL
WBC # BLD AUTO: 5.1 THOUSAND/UL (ref 4.31–10.16)
WBC #/AREA URNS AUTO: ABNORMAL /HPF

## 2024-09-19 PROCEDURE — 36415 COLL VENOUS BLD VENIPUNCTURE: CPT

## 2024-09-19 PROCEDURE — 88185 FLOWCYTOMETRY/TC ADD-ON: CPT | Performed by: PHYSICIAN ASSISTANT

## 2024-09-19 PROCEDURE — 85025 COMPLETE CBC W/AUTO DIFF WBC: CPT

## 2024-09-19 PROCEDURE — 81001 URINALYSIS AUTO W/SCOPE: CPT

## 2024-09-19 PROCEDURE — 87086 URINE CULTURE/COLONY COUNT: CPT

## 2024-09-19 PROCEDURE — 99204 OFFICE O/P NEW MOD 45 MIN: CPT | Performed by: PHYSICIAN ASSISTANT

## 2024-09-19 PROCEDURE — 86140 C-REACTIVE PROTEIN: CPT

## 2024-09-19 PROCEDURE — 82525 ASSAY OF COPPER: CPT

## 2024-09-19 PROCEDURE — 80053 COMPREHEN METABOLIC PANEL: CPT

## 2024-09-19 PROCEDURE — 88184 FLOWCYTOMETRY/ TC 1 MARKER: CPT

## 2024-09-19 NOTE — PROGRESS NOTES
[facility] HEMATOLOGY ONCOLOGY SPECIALISTS [address] [address]  Hematology Ambulatory Consult  Micah Bensonedgar Camarena, 1980, [id number]  9/19/2024      Assessment and Plan   1. Leukopenia, unspecified type  2. Unintended weight loss  3. Dysphagia, unspecified type  - Ambulatory Referral to Gastroenterology   - CBC and differential; Future  - Comprehensive metabolic panel; Future  - Copper Level; Future  - C-reactive protein; Future  - Leukemia/Lymphoma flow cytometry; Future  - CBC and differential; Future    44-year-old male who is seen in consultation today for leukopenia which is new as of 03/2024.  He recently was found to have low B12 and was started on oral B12 therapy.  Most recent labs from 07/31/2024 show persistent leukopenia with WBC 3.73.  ANC is mildly reduced 1.77.  Remainder of CBC is normal.  He does have unintentional weight loss and complaints of dysphagia. Physical exam today was unremarkable    Discussion/Plan   We reviewed the most common etiologies for leukocytopenia include antibiotic use, anti-epileptics, hyperthyroidism,  B12 deficiency, bone marrow disorders including MDS, Chronic infection (HIV) or immune disease, alcohol abuse, cirrhosis, and splenomegaly.   Based on the patient's history, I suspect this may be related to Remeron use given the timeline of events.  He reports that this has been effective for his insomnia and he is now taking Raozerem.  I will reach out to his neurologist to see if Remeron can be discontinued.   Advise repeat CBC.  We will obtain additional oratory studies as above to rule out any nutritional deficiencies and obtain flow cytometry to evaluate for any abnormal myeloproliferative or lymphoproliferative disorder.  He is complaining of dysphagia and unintentional weight loss.  We will place referral to gastroenterology.  I stressed the importance of referral to GI for consideration of endoscopy, rule  out GERD, Stratton's esophagus, esophageal cancer.   RTC 6 weeks     Patient voiced agreement and understanding to the above.   Patient knows to call the Hematology/Oncology office with any questions and concerns regarding the above.    Barrier(s) to care: None.    Amanda Wilkins PA-C  Medical Oncology/Hematology  [facility]    Subjective     Chief Complaint   Patient presents with    Consult     Leukopenia, unspecified type       Referring provider    Vangie Stephenson, LESLIE  [address] [place] [address]    History of present illness: 43-year-old male with history of Parkinson disease who has been referred by PCP for evaluation of leukopenia.    1. Leukopenia   New as of 03/2024 with wbc 4.12 and normal differential. ANDREA, RF, Lyme testing negative. B12 247 and was started on oral b12 therapy. Folate and iron panel were normal. Follow up labs 07/31/2024 show worsening leukopenia now with ANC 1.77.     Patient denies frequent infection, fever, night sweats, lymphadenopathy.  He has had recent unintentional weight loss (see below).  History of liver disease, splenomegaly, history or autoimmune disease.  He had negative HIV and hepatitis testing a couple months ago.  He recently was found to have B12 deficiency was started on oral B12 replacement. Vapes nicotine. Social alcohol use No drug use     2. Weight loss  Over the past year, he has lost 10lb. He has not had not had any changes in diet or medication over the past. Eating pattern is about the same. No diarrhea.   TSH HIV, Hepatitis C, PSA screen and Hemoccult study was normal.   CT c/a/p performed by PCP 08/02/2024 showed remote granulomatous disease. No evidence of any masses or malignancy. He has a known maxillary sinus cysts which is being evaluated by ENT. Planning to have dediated CT study completed for further evaluation     Medication changes???  He has never had colonoscopy     3. Parksions disease   He was diagnosed at  [facility] with Parkinson at the age of 30. He had extensive work up including LP.    Paternal breast cancer, father had lung cancer       Review of Systems   Constitutional:  Positive for unexpected weight change. Negative for fatigue and fever.        He feels hot at night. Occurs intermittently. No night sweats     HENT:  Positive for congestion (x months. has drops of blood when blwoing nose. epistaxis only a couple times a year).         He is having trouble swallowing big pills at times, feel they are getting stuck. No food dysphagia.    Eyes:  Negative for visual disturbance.   Respiratory:  Negative for choking and shortness of breath.    Cardiovascular:  Negative for chest pain.   Gastrointestinal:  Negative for abdominal pain, blood in stool, diarrhea and nausea.        No melena   Genitourinary:  Negative for hematuria.   Musculoskeletal:         He has pain in his left leg that began this year. Had XR that was normal. He was started on meloxicam with improvement.      Skin:  Negative for rash.   Neurological:  Negative for headaches.   Hematological:  Does not bruise/bleed easily.   All other systems reviewed and are negative.      Past Medical History:   Diagnosis Date    Hypertension 2022    Memory loss 2016    Testing show cognitive decline due to Parkinson’s    Parkinson's disease      No past surgical history on file.  Family History   Problem Relation Age of Onset    Hypertension Mother     Lung cancer Father     Hypertension Father         Passed away due to lung cancer    Cancer Father         Lung Cancer-Passed away 8-13-22    Parkinsonism Paternal Uncle     Breast cancer Paternal Aunt         Passed away    Cancer Paternal Aunt         Breast Cancer- Passed away     Social History     Socioeconomic History    Marital status: /Civil Union     Spouse name: None    Number of children: None    Years of education: None    Highest education level: None   Occupational History    None    Tobacco Use    Smoking status: Former     Current packs/day: 0.00     Average packs/day: 0.5 packs/day for 15.8 years (7.9 ttl pk-yrs)     Types: Cigarettes     Start date: 2006     Quit date: 2021     Years since quittin.8    Smokeless tobacco: Never   Vaping Use    Vaping status: Some Days    Substances: Nicotine   Substance and Sexual Activity    Alcohol use: Yes     Alcohol/week: 2.0 standard drinks of alcohol     Types: 2 Standard drinks or equivalent per week     Comment:     Drug use: No    Sexual activity: Not Currently     Partners: Female     Birth control/protection: I.U.D.   Other Topics Concern    None   Social History Narrative    None     Social Determinants of Health     Financial Resource Strain: Low Risk  (2023)    Overall Financial Resource Strain (CARDIA)     Difficulty of Paying Living Expenses: Not very hard   Food Insecurity: No Food Insecurity (2024)    Hunger Vital Sign     Worried About Running Out of Food in the Last Year: Never true     Ran Out of Food in the Last Year: Never true   Transportation Needs: No Transportation Needs (2024)    PRAPARE - Transportation     Lack of Transportation (Medical): No     Lack of Transportation (Non-Medical): No   Physical Activity: Not on file   Stress: Not on file   Social Connections: Unknown (2024)    Received from [employer]    Social [employer]     How often do you feel lonely or isolated from those around you? (Adult - for ages 18 years and over): Not on file   Intimate Partner Violence: Not on file   Housing Stability: Low Risk  (2024)    Housing Stability Vital Sign     Unable to Pay for Housing in the Last Year: No     Number of Times Moved in the Last Year: 0     Homeless in the Last Year: No         Current Outpatient Medications:     carbidopa-levodopa (SINEMET)  mg per tablet, take 1 tablet by mouth four times a day, Disp: 360 tablet, Rfl: 1    cholecalciferol (VITAMIN D3) 1,000  "units tablet, Take 1 tablet (1,000 Units total) by mouth daily, Disp: 90 tablet, Rfl: 1    entacapone (COMTAN) 200 mg tablet, take 1 tablet by mouth four times a day, Disp: 120 tablet, Rfl: 5    lisinopril (ZESTRIL) 20 mg tablet, Take 1 tablet (20 mg total) by mouth daily, Disp: 90 tablet, Rfl: 1    meloxicam (Mobic) 15 mg tablet, Take 1 tablet (15 mg total) by mouth daily, Disp: 30 tablet, Rfl: 5    mirtazapine (REMERON) 15 mg tablet, take 1 tablet by mouth at bedtime, Disp: 30 tablet, Rfl: 5    omega-3-acid ethyl esters (LOVAZA) 1 g capsule, Take 2 capsules (2 g total) by mouth 2 (two) times a day, Disp: 360 capsule, Rfl: 1    ramelteon (ROZEREM) 8 mg tablet, Take 1 tablet (8 mg total) by mouth daily at bedtime, Disp: 30 tablet, Rfl: 0    rosuvastatin (CRESTOR) 10 MG tablet, Take 1 tablet (10 mg total) by mouth daily, Disp: 90 tablet, Rfl: 1    cyanocobalamin (VITAMIN B-12) 1000 MCG tablet, Take 1,000 mcg by mouth daily (Patient not taking: Reported on 8/28/2024), Disp: , Rfl:   No Known Allergies    Objective   Resp 16   Ht 5' 10\" (1.778 m)   Wt 78 kg (172 lb)   BMI 24.68 kg/m²   Physical Exam  Vitals reviewed.   HENT:      Head: Normocephalic.   Cardiovascular:      Rate and Rhythm: Normal rate and regular rhythm.   Pulmonary:      Effort: Pulmonary effort is normal.      Breath sounds: Normal breath sounds.   Abdominal:      Palpations: Abdomen is soft. There is no hepatomegaly or splenomegaly.      Tenderness: There is no abdominal tenderness.   Musculoskeletal:      Cervical back: Neck supple.   Lymphadenopathy:      Cervical: No cervical adenopathy.      Upper Body:      Right upper body: No supraclavicular or axillary adenopathy.      Left upper body: No supraclavicular or axillary adenopathy.   Skin:     Findings: No rash.   Neurological:      Mental Status: He is alert.         Result Review  Labs:  No visits with results within 30 Day(s) from this visit.   Latest known visit with results is: "   Appointment on 08/05/2024   Component Date Value Ref Range Status    OCCULT BLD, FECAL IMMUNOLOGICAL 08/05/2024 Negative  Negative Final       Imaging:   CT CHEST, ABDOMEN AND PELVIS WITH IV CONTRAST     INDICATION:   Abnormal weight loss. .     COMPARISON: CT chest dated 7/26/2023 and CT abdomen dated 4/21/2023     TECHNIQUE: CT examination of the chest, abdomen and pelvis was performed. Multiplanar 2D reformatted images were created from the source data.     This examination, like all CT scans performed in the [facility], was performed utilizing techniques to minimize radiation dose exposure, including the use of iterative reconstruction and automated exposure control. Radiation dose length   product (DLP) for this visit:     IV Contrast:  Enteric Contrast: Enteric contrast was not administered.     FINDINGS:     CHEST     LUNGS: Calcified granulomas in the lower lobes.  There is no tracheal or endobronchial lesion.     PLEURA:  Unremarkable.     HEART/GREAT VESSELS: Heart is unremarkable for patient's age.  No thoracic aortic aneurysm.     MEDIASTINUM AND CRISTIAN:  Unremarkable.     CHEST WALL AND LOWER NECK: Heterogeneous thyroid gland.     ABDOMEN     LIVER/BILIARY TREE:  Liver is diffusely decreased in density consistent with fatty change.  No CT evidence of suspicious hepatic mass.  Normal hepatic contours.  No biliary dilatation.     GALLBLADDER:  No calcified gallstones. No pericholecystic inflammatory change.     SPLEEN:  Unremarkable.     PANCREAS:  Unremarkable.     ADRENAL GLANDS:  Unremarkable.     KIDNEYS/URETERS:  Unremarkable. No hydronephrosis.     STOMACH AND BOWEL:  Unremarkable.     APPENDIX:  No findings to suggest appendicitis.     ABDOMINOPELVIC CAVITY:  No ascites.  No pneumoperitoneum.  No lymphadenopathy.     VESSELS:  Unremarkable for patient's age.     PELVIS     REPRODUCTIVE ORGANS:  Unremarkable for patient's age.     URINARY BLADDER:  Unremarkable.     ABDOMINAL  WALL/INGUINAL REGIONS:  There is a small fat-containing umbilical hernia.     OSSEOUS STRUCTURES:  No acute fracture or destructive osseous lesion.  Spinal degenerative changes are noted.     IMPRESSION:     1. No intra-abdominal mass or lymphadenopathy. Normal enhancing pancreas without ductal dilation. Normal caliber bowel loops and appendix.     2. No suspicious pulmonary lesions. No thoracic lymphadenopathy. No chest wall mass.     3. Remote granulomatous disease.     Please note:  This report has been generated by a voice recognition software system. Therefore there may be syntax, spelling, and/or grammatical errors. Please call if you have any questions.

## 2024-09-20 LAB — BACTERIA UR CULT: NORMAL

## 2024-09-23 ENCOUNTER — TELEPHONE (OUTPATIENT)
Dept: HEMATOLOGY ONCOLOGY | Facility: CLINIC | Age: 44
End: 2024-09-23

## 2024-09-23 LAB — COPPER SERPL-MCNC: 81 UG/DL (ref 69–132)

## 2024-09-23 NOTE — TELEPHONE ENCOUNTER
Called and spoke with pt, he verbalized understanding and agreed to repeat CBC closer to f/u appt time   ----- Message from Amanda Wilkins PA-C sent at 9/23/2024  4:20 PM EDT -----  Please let him know workup has been unrevealing so far and his white blood count is now normal!.  Needs CBC prior to next follow-up appointment.

## 2024-09-24 ENCOUNTER — HOSPITAL ENCOUNTER (OUTPATIENT)
Dept: CT IMAGING | Facility: CLINIC | Age: 44
Discharge: HOME | End: 2024-09-24
Payer: COMMERCIAL

## 2024-09-24 DIAGNOSIS — M27.40 MAXILLARY CYST: ICD-10-CM

## 2024-09-24 LAB — SCAN RESULT: NORMAL

## 2024-09-24 PROCEDURE — 70486 CT MAXILLOFACIAL W/O DYE: CPT

## 2024-09-25 ENCOUNTER — TELEPHONE (OUTPATIENT)
Dept: NEUROLOGY | Facility: CLINIC | Age: 44
End: 2024-09-25

## 2024-09-25 NOTE — TELEPHONE ENCOUNTER
Shanice Cardona MD  Neurology Neurovascular Team 430 minutes ago (12:47 PM)       Please contact patient inform That Hematology did reach out with the question as to whether we could discontinue mirtazapine as this can cause leukocytopenia. See how he is dong with regards to sleep. I recall him not feeling mirtazapine was helpful therefore we can try discontinuing. Would have him take 1/2 tab nightly for a week then stop.   __________  Patient aware of recommendation; verbalized understanding and will discontinue as recommended; he confirmed medication was not helpful to assist with sleep so was receptive to stopping.

## 2024-09-30 DIAGNOSIS — G47.9 SLEEP DISTURBANCE: ICD-10-CM

## 2024-09-30 DIAGNOSIS — G20.A1 PARKINSON'S DISEASE (HCC): Chronic | ICD-10-CM

## 2024-10-02 ENCOUNTER — TELEPHONE (OUTPATIENT)
Age: 44
End: 2024-10-02

## 2024-10-02 RX ORDER — RAMELTEON 8 MG/1
8 TABLET ORAL
Qty: 30 TABLET | Refills: 5 | Status: SHIPPED | OUTPATIENT
Start: 2024-10-02

## 2024-10-02 NOTE — TELEPHONE ENCOUNTER
Maria Del Rosario Hawley         10/2/24 11:09 AM  Note     Patient`s wife called in and stated that the patient requested a refill of the following medications.   ramelteon (ROZEREM) 8 mg tablet [[id number]]  ENDED    Order Details  Dose: 8 mg Route: Oral Frequency: Daily at bedtime   Dispense Quantity: 30 tablet Refills: 0             Sig: Take 1 tablet (8 mg total) by mouth daily at bedtime           Start Date: 08/28/24 End Date: 09/27/24 after 30 doses   Written Date: 08/28/24 Expiration Date: 08/28/25   She stated that he submitted a request 3 days ago and he is taking his last pill tonight.      Please assist.      Please call pt back with any updates / Advise for medication.      Phone # [phone]

## 2024-10-02 NOTE — TELEPHONE ENCOUNTER
Script sent. Pt read SEJENT message:    Last read by Ralph Broadbent Jr. at 12:25 PM on 10/2/2024.

## 2024-10-02 NOTE — TELEPHONE ENCOUNTER
Patient`s wife called in and stated that the patient requested a refill of the following medications.   ramelteon (ROZEREM) 8 mg tablet [[id number]]  ENDED    Order Details  Dose: 8 mg Route: Oral Frequency: Daily at bedtime   Dispense Quantity: 30 tablet Refills: 0          Sig: Take 1 tablet (8 mg total) by mouth daily at bedtime         Start Date: 08/28/24 End Date: 09/27/24 after 30 doses   Written Date: 08/28/24 Expiration Date: 08/28/25   She stated that he submitted a request 3 days ago and he is taking his last pill tonight.     Please assist.     Please call pt back with any updates / Advise for medication.     Phone # [phone]

## 2024-10-02 NOTE — TELEPHONE ENCOUNTER
Ramelton 8 mg    Next OV 2/12/2025 with Dr. Cochran  LOV 8/28/2024 with Dr. Cochran    Last script sent 8/28/2024. Quantity: 30. Refills: 0.    Dr. Cochran - Rx entered. Please review and sign if in agreement.

## 2024-10-24 ENCOUNTER — APPOINTMENT (OUTPATIENT)
Dept: LAB | Facility: CLINIC | Age: 44
End: 2024-10-24
Payer: COMMERCIAL

## 2024-10-24 DIAGNOSIS — D72.819 LEUKOPENIA, UNSPECIFIED TYPE: Primary | ICD-10-CM

## 2024-10-24 DIAGNOSIS — D72.819 LEUKOPENIA, UNSPECIFIED TYPE: ICD-10-CM

## 2024-10-24 LAB
BASOPHILS # BLD AUTO: 0.04 THOUSANDS/ΜL (ref 0–0.1)
BASOPHILS NFR BLD AUTO: 1 % (ref 0–1)
EOSINOPHIL # BLD AUTO: 0.09 THOUSAND/ΜL (ref 0–0.61)
EOSINOPHIL NFR BLD AUTO: 2 % (ref 0–6)
ERYTHROCYTE [DISTWIDTH] IN BLOOD BY AUTOMATED COUNT: 12.1 % (ref 11.6–15.1)
HCT VFR BLD AUTO: 45 % (ref 36.5–49.3)
HGB BLD-MCNC: 15.2 G/DL (ref 12–17)
IMM GRANULOCYTES # BLD AUTO: 0.01 THOUSAND/UL (ref 0–0.2)
IMM GRANULOCYTES NFR BLD AUTO: 0 % (ref 0–2)
LYMPHOCYTES # BLD AUTO: 1.08 THOUSANDS/ΜL (ref 0.6–4.47)
LYMPHOCYTES NFR BLD AUTO: 28 % (ref 14–44)
MCH RBC QN AUTO: 30.1 PG (ref 26.8–34.3)
MCHC RBC AUTO-ENTMCNC: 33.8 G/DL (ref 31.4–37.4)
MCV RBC AUTO: 89 FL (ref 82–98)
MONOCYTES # BLD AUTO: 0.44 THOUSAND/ΜL (ref 0.17–1.22)
MONOCYTES NFR BLD AUTO: 11 % (ref 4–12)
NEUTROPHILS # BLD AUTO: 2.26 THOUSANDS/ΜL (ref 1.85–7.62)
NEUTS SEG NFR BLD AUTO: 58 % (ref 43–75)
NRBC BLD AUTO-RTO: 0 /100 WBCS
PLATELET # BLD AUTO: 205 THOUSANDS/UL (ref 149–390)
PMV BLD AUTO: 10.7 FL (ref 8.9–12.7)
RBC # BLD AUTO: 5.05 MILLION/UL (ref 3.88–5.62)
WBC # BLD AUTO: 3.92 THOUSAND/UL (ref 4.31–10.16)

## 2024-10-24 PROCEDURE — 85025 COMPLETE CBC W/AUTO DIFF WBC: CPT

## 2024-10-24 PROCEDURE — 36415 COLL VENOUS BLD VENIPUNCTURE: CPT

## 2024-10-30 ENCOUNTER — OFFICE VISIT (OUTPATIENT)
Dept: FAMILY MEDICINE CLINIC | Facility: CLINIC | Age: 44
End: 2024-10-30
Payer: COMMERCIAL

## 2024-10-30 VITALS
BODY MASS INDEX: 24.07 KG/M2 | SYSTOLIC BLOOD PRESSURE: 124 MMHG | TEMPERATURE: 97.6 F | HEIGHT: 70 IN | WEIGHT: 168.1 LBS | DIASTOLIC BLOOD PRESSURE: 76 MMHG | HEART RATE: 89 BPM | OXYGEN SATURATION: 98 %

## 2024-10-30 DIAGNOSIS — R63.4 UNINTENDED WEIGHT LOSS: ICD-10-CM

## 2024-10-30 DIAGNOSIS — R53.82 CHRONIC FATIGUE: ICD-10-CM

## 2024-10-30 DIAGNOSIS — I10 PRIMARY HYPERTENSION: Primary | ICD-10-CM

## 2024-10-30 PROCEDURE — 99214 OFFICE O/P EST MOD 30 MIN: CPT | Performed by: NURSE PRACTITIONER

## 2024-10-30 PROCEDURE — G2211 COMPLEX E/M VISIT ADD ON: HCPCS | Performed by: NURSE PRACTITIONER

## 2024-10-30 RX ORDER — FLUTICASONE PROPIONATE 50 MCG
1 SPRAY, SUSPENSION (ML) NASAL DAILY
COMMUNITY

## 2024-10-30 NOTE — ASSESSMENT & PLAN NOTE
Blood pressure controlled in office today.  To continue on lisinopril 20 mg as prescribed.  Encouraged heart healthy diet, monitor sodium intake.

## 2024-10-30 NOTE — ASSESSMENT & PLAN NOTE
Advised to continue follow-up with neurology for Parkinson's.  Was following with endocrinology for low testosterone, but has not been able to follow-up with them.  Advised to continue with vitamin D and B12 as previously prescribed.

## 2024-10-30 NOTE — PROGRESS NOTES
Ambulatory Visit  Name: Ralph Broadbent Jr.      : 1980      MRN: [id number]  Encounter Provider: LESLIE Moura  Encounter Date: 10/30/2024   Encounter department: [facility] [address]    Assessment & Plan  Primary hypertension  Blood pressure controlled in office today.  To continue on lisinopril 20 mg as prescribed.  Encouraged heart healthy diet, monitor sodium intake.       Unintended weight loss  Patient denies food aversion.  Encouraged to take in foods high in protein, carbohydrates.  Advised to continue follow-up with hematology.  To set up appoint with GI as previously referred.       Chronic fatigue  Advised to continue follow-up with neurology for Parkinson's.  Was following with endocrinology for low testosterone, but has not been able to follow-up with them.  Advised to continue with vitamin D and B12 as previously prescribed.          History of Present Illness     Patient presents to the office for 3-month follow-up.  He has been following with neurology, hematology, ENT.  Had CT scan at the end of last month of sinuses, but has not heard back from ENT regarding results.  Patient states he began taking Flonase daily due to increased congestion nasal passages.  Denies sinus pain/pressure, loss of smell, snoring.  Patient states he still losing weight unintentionally.  Referred to GI, but is not been able to make appointment yet.  Denies decreased eating, states he feels his appetite is good.  Notes recurrence of chronic fatigue.  Denies abnormal bleeding/bruising, chest pain, shortness of breath, syncope.        History obtained from : patient  Review of Systems   Constitutional:  Positive for fatigue (chronic) and unexpected weight change. Negative for activity change and appetite change.   HENT:  Positive for congestion. Negative for ear pain, sinus pressure, sinus pain and sore throat.    Eyes:  Negative for photophobia and visual disturbance.    Respiratory:  Negative for cough and shortness of breath.    Cardiovascular:  Negative for chest pain and palpitations.   Gastrointestinal:  Negative for nausea and vomiting.   Genitourinary:  Negative for decreased urine volume.   Musculoskeletal:  Negative for arthralgias and myalgias.   Skin:  Negative for color change and rash.   Neurological:  Negative for dizziness, syncope, weakness and headaches.   Psychiatric/Behavioral:  Negative for agitation and confusion. The patient is not nervous/anxious.      Pertinent Medical History         Medical History Reviewed by provider this encounter:  Tobacco  Allergies  Meds  Problems  Med Hx  Surg Hx  Fam Hx  Soc   Hx      Past Medical History   Past Medical History:   Diagnosis Date    Hypertension 2022    Memory loss 2016    Testing show cognitive decline due to Parkinson’s    Parkinson's disease (HCC)      History reviewed. No pertinent surgical history.  Family History   Problem Relation Age of Onset    Hypertension Mother     Lung cancer Father     Hypertension Father         Passed away due to lung cancer    Cancer Father         Lung Cancer-Passed away 8-13-22    Breast cancer Paternal Aunt         Passed away    Cancer Paternal Aunt         Breast Cancer- Passed away    Parkinsonism Paternal Uncle      Current Outpatient Medications on File Prior to Visit   Medication Sig Dispense Refill    carbidopa-levodopa (SINEMET)  mg per tablet take 1 tablet by mouth four times a day 360 tablet 1    cholecalciferol (VITAMIN D3) 1,000 units tablet Take 1 tablet (1,000 Units total) by mouth daily 90 tablet 1    cyanocobalamin (VITAMIN B-12) 1000 MCG tablet Take 1,000 mcg by mouth every other day      entacapone (COMTAN) 200 mg tablet take 1 tablet by mouth four times a day 120 tablet 5    fluticasone (FLONASE) 50 mcg/act nasal spray 1 spray into each nostril daily      lisinopril (ZESTRIL) 20 mg tablet Take 1 tablet (20 mg total) by mouth daily 90 tablet 1     meloxicam (Mobic) 15 mg tablet Take 1 tablet (15 mg total) by mouth daily 30 tablet 5    omega-3-acid ethyl esters (LOVAZA) 1 g capsule Take 2 capsules (2 g total) by mouth 2 (two) times a day 360 capsule 1    ramelteon (ROZEREM) 8 mg tablet Take 1 tablet (8 mg total) by mouth daily at bedtime 30 tablet 5    rosuvastatin (CRESTOR) 10 MG tablet Take 1 tablet (10 mg total) by mouth daily 90 tablet 1    [DISCONTINUED] mirtazapine (REMERON) 15 mg tablet take 1 tablet by mouth at bedtime 30 tablet 5     No current facility-administered medications on file prior to visit.   No Known Allergies   Current Outpatient Medications on File Prior to Visit   Medication Sig Dispense Refill    carbidopa-levodopa (SINEMET)  mg per tablet take 1 tablet by mouth four times a day 360 tablet 1    cholecalciferol (VITAMIN D3) 1,000 units tablet Take 1 tablet (1,000 Units total) by mouth daily 90 tablet 1    cyanocobalamin (VITAMIN B-12) 1000 MCG tablet Take 1,000 mcg by mouth every other day      entacapone (COMTAN) 200 mg tablet take 1 tablet by mouth four times a day 120 tablet 5    fluticasone (FLONASE) 50 mcg/act nasal spray 1 spray into each nostril daily      lisinopril (ZESTRIL) 20 mg tablet Take 1 tablet (20 mg total) by mouth daily 90 tablet 1    meloxicam (Mobic) 15 mg tablet Take 1 tablet (15 mg total) by mouth daily 30 tablet 5    omega-3-acid ethyl esters (LOVAZA) 1 g capsule Take 2 capsules (2 g total) by mouth 2 (two) times a day 360 capsule 1    ramelteon (ROZEREM) 8 mg tablet Take 1 tablet (8 mg total) by mouth daily at bedtime 30 tablet 5    rosuvastatin (CRESTOR) 10 MG tablet Take 1 tablet (10 mg total) by mouth daily 90 tablet 1    [DISCONTINUED] mirtazapine (REMERON) 15 mg tablet take 1 tablet by mouth at bedtime 30 tablet 5     No current facility-administered medications on file prior to visit.      Social History     Tobacco Use    Smoking status: Former     Current packs/day: 0.00     Average packs/day: 0.5  "packs/day for 15.8 years (7.9 ttl pk-yrs)     Types: Cigarettes     Start date: 2006     Quit date: 2021     Years since quittin.9    Smokeless tobacco: Never   Vaping Use    Vaping status: Some Days    Substances: Nicotine   Substance and Sexual Activity    Alcohol use: Yes     Alcohol/week: 2.0 standard drinks of alcohol     Types: 2 Standard drinks or equivalent per week     Comment: friday nights    Drug use: No    Sexual activity: Not Currently     Partners: Female     Birth control/protection: I.U.D.         Objective     /76 (BP Location: Left arm, Patient Position: Sitting)   Pulse 89   Temp 97.6 °F (36.4 °C)   Ht 5' 10\" (1.778 m)   Wt 76.2 kg (168 lb 1.6 oz)   SpO2 98%   BMI 24.12 kg/m²     Physical Exam  Vitals reviewed.   Constitutional:       General: He is not in acute distress.     Appearance: Normal appearance. He is not ill-appearing.   HENT:      Head: Normocephalic and atraumatic.      Right Ear: Tympanic membrane, ear canal and external ear normal.      Left Ear: Tympanic membrane, ear canal and external ear normal.      Nose: Congestion present.      Mouth/Throat:      Mouth: Mucous membranes are moist.      Pharynx: Oropharynx is clear.   Eyes:      Pupils: Pupils are equal, round, and reactive to light.   Cardiovascular:      Rate and Rhythm: Normal rate and regular rhythm.      Pulses: Normal pulses.      Heart sounds: Normal heart sounds. No murmur heard.  Pulmonary:      Effort: Pulmonary effort is normal.      Breath sounds: Normal breath sounds.   Musculoskeletal:         General: Normal range of motion.      Cervical back: Normal range of motion and neck supple.   Skin:     General: Skin is warm and dry.   Neurological:      General: No focal deficit present.      Mental Status: He is alert and oriented to person, place, and time.   Psychiatric:         Mood and Affect: Mood normal.         Behavior: Behavior normal.         "

## 2024-11-04 ENCOUNTER — OFFICE VISIT (OUTPATIENT)
Dept: HEMATOLOGY ONCOLOGY | Facility: CLINIC | Age: 44
End: 2024-11-04
Payer: COMMERCIAL

## 2024-11-04 VITALS
SYSTOLIC BLOOD PRESSURE: 122 MMHG | TEMPERATURE: 97.7 F | RESPIRATION RATE: 18 BRPM | HEART RATE: 89 BPM | WEIGHT: 170 LBS | BODY MASS INDEX: 24.34 KG/M2 | DIASTOLIC BLOOD PRESSURE: 76 MMHG | OXYGEN SATURATION: 99 % | HEIGHT: 70 IN

## 2024-11-04 DIAGNOSIS — D72.819 LEUKOPENIA, UNSPECIFIED TYPE: Primary | ICD-10-CM

## 2024-11-04 DIAGNOSIS — R63.4 UNINTENDED WEIGHT LOSS: ICD-10-CM

## 2024-11-04 PROCEDURE — 99213 OFFICE O/P EST LOW 20 MIN: CPT | Performed by: PHYSICIAN ASSISTANT

## 2024-11-04 NOTE — PROGRESS NOTES
[facility] HEMATOLOGY ONCOLOGY SPECIALISTS [place]  [address]  [place] [place] [facility] [place] [address]  Hematology Ambulatory Follow-Up  Ralph Broadbent JrJohnnie, 1980, [id number]  11/4/2024      Assessment and Plan   1. Leukopenia, unspecified type  2. Unintended weight loss  - CBC and differential; Future  - Peripheral Smear; Future  - LD,Blood; Future  - Leukemia/Lymphoma flow cytometry; Future      44-year-old male who is seen in follow up today for leukopenia which is new as of 03/2024.  He recently was found to have low B12 and was started on oral B12 therapy.  Most recent labs from 10/24 show persistent leukopenia with WBC of 3.92 however differential is normal.  No other cytopenias.  Previous peripheral flow cytometry, HIV, hepatitis, CRP were unrevealing. He does have unintentional weight loss and complaints of dysphagia. He was referred to GI however has not met with their team. Following with ENT for sinus cyst.     Discussion/Plan   Leukopenia remained stable.  May be side effect from his Rozerem.  Continue observation  He has had 3% weight loss in past 6 months.We discussed pursuing bone marrow biopsy to rule out MPN. Patient declined and is favor of continued observation. We will see him back in the office in 2 months for close follow up with repeat CBC, flow cytometry, LD. If he continues to have weight loss and/or development of worsening leukopenia, other cytopenias, or constitutional symptoms we will pursue BMBx. Patient educated to reach out to our office sooner if he develops any fevers, night sweats, enlarge lymph nodes, lumps or bumps or new rashes.   Follow up with GI   RTC 2m     Patient voiced agreement and understanding to the above.   Patient advised to call the Hematology/Oncology office with any questions and concerns regarding the above.    Barrier(s) to care: None  The patient is able to self care.    Amanda Wilkins PA-C   Medical Oncology/Hematology  [facility]  [facility]    Subjective     Chief Complaint   Patient presents with    Follow-up       History of present illness: 43-year-old male with history of Parkinson disease who has been referred by PCP for evaluation of leukopenia.     1. Leukopenia   New as of 03/2024 with wbc 4.12 and normal differential. ANDREA, RF, Lyme testing negative. B12 247 and was started on oral b12 therapy. Folate and iron panel were normal. Follow up labs 07/31/2024 show worsening leukopenia now with ANC 1.77.      Patient denies frequent infection, fever, night sweats, lymphadenopathy.  He has had recent unintentional weight loss (see below).  History of liver disease, splenomegaly, history or autoimmune disease.  He had negative HIV and hepatitis testing a couple months ago.  He recently was found to have B12 deficiency was started on oral B12 replacement. Vapes nicotine. Social alcohol use No drug use      2. Weight loss  Over the past year, he has lost 10lb. He has not had not had any changes in diet or medication over the past. Eating pattern is about the same. No diarrhea.   TSH HIV, Hepatitis C, PSA screen and Hemoccult study was normal.   CT c/a/p performed by PCP 08/02/2024 showed remote granulomatous disease. No evidence of any masses or malignancy. He has a known maxillary sinus cysts which is being evaluated by ENT. Planning to have dediated CT study completed for further evaluation      Medication changes???  He has never had colonoscopy      3. Parksions disease   He was diagnosed at [facility] with Parkinson at the age of 30. He had extensive work up including LP.     Paternal breast cancer, father had lung cance    11/04/24 :  Lab Results   Component Value Date    IRON 98 03/18/2024    TIBC 339 03/18/2024    FERRITIN 156 03/18/2024     Lab Results   Component Value Date    WBC 3.92 (L) 10/24/2024    HGB 15.2 10/24/2024    HCT 45.0 10/24/2024    MCV 89 10/24/2024     10/24/2024       Interval  history: complains of ongoing weight loss. Has lost 6lb in past 6 months. He did not see GI. He had additional imaging through ENT with no concerning findings.     Review of Systems   All other systems reviewed and are negative.      Patient Active Problem List   Diagnosis    Parkinson's disease (HCC)    Hypertriglyceridemia    Hypertension    Current mild episode of major depressive disorder without prior episode (HCC)    Osteoarthritis of left hip    Dizziness    Chronic fatigue    Vitamin D insufficiency    Calcified granuloma of lung    Right hand paresthesia    Sleep disturbance    Transient visual disturbance    Low testosterone in male    Vitamin B12 deficiency    Mixed hyperlipidemia     Past Medical History:   Diagnosis Date    Hypertension 2022    Memory loss 2016    Testing show cognitive decline due to Parkinson’s    Parkinson's disease (HCC)      No past surgical history on file.  Family History   Problem Relation Age of Onset    Hypertension Mother     Lung cancer Father     Hypertension Father         Passed away due to lung cancer    Cancer Father         Lung Cancer-Passed away 8-13-22    Breast cancer Paternal Aunt         Passed away    Cancer Paternal Aunt         Breast Cancer- Passed away    Parkinsonism Paternal Uncle      Social History     Socioeconomic History    Marital status: /Civil Union     Spouse name: None    Number of children: None    Years of education: None    Highest education level: None   Occupational History    None   Tobacco Use    Smoking status: Former     Current packs/day: 0.00     Average packs/day: 0.5 packs/day for 15.8 years (7.9 ttl pk-yrs)     Types: Cigarettes     Start date: 1/1/2006     Quit date: 11/1/2021     Years since quitting: 3.0    Smokeless tobacco: Never   Vaping Use    Vaping status: Some Days    Substances: Nicotine   Substance and Sexual Activity    Alcohol use: Yes     Alcohol/week: 2.0 standard drinks of alcohol     Types: 2 Standard drinks  or equivalent per week     Comment: friday nights    Drug use: No    Sexual activity: Not Currently     Partners: Female     Birth control/protection: I.U.D.   Other Topics Concern    None   Social History Narrative    None     Social Determinants of Health     Financial Resource Strain: Low Risk  (7/23/2023)    Overall Financial Resource Strain (CARDIA)     Difficulty of Paying Living Expenses: Not very hard   Food Insecurity: No Food Insecurity (7/26/2024)    Nursing - Inadequate Food Risk Classification     Worried About Running Out of Food in the Last Year: Never true     Ran Out of Food in the Last Year: Never true     Ran Out of Food in the Last Year: Not on file   Transportation Needs: No Transportation Needs (7/26/2024)    PRAPARE - Transportation     Lack of Transportation (Medical): No     Lack of Transportation (Non-Medical): No   Physical Activity: Not on file   Stress: Not on file   Social Connections: Unknown (6/18/2024)    Received from [employer]     How often do you feel lonely or isolated from those around you? (Adult - for ages 18 years and over): Not on file   Intimate Partner Violence: Not on file   Housing Stability: Low Risk  (7/26/2024)    Housing Stability Vital Sign     Unable to Pay for Housing in the Last Year: No     Number of Times Moved in the Last Year: 0     Homeless in the Last Year: No       Current Outpatient Medications:     carbidopa-levodopa (SINEMET)  mg per tablet, take 1 tablet by mouth four times a day, Disp: 360 tablet, Rfl: 1    cholecalciferol (VITAMIN D3) 1,000 units tablet, Take 1 tablet (1,000 Units total) by mouth daily, Disp: 90 tablet, Rfl: 1    cyanocobalamin (VITAMIN B-12) 1000 MCG tablet, Take 1,000 mcg by mouth every other day, Disp: , Rfl:     entacapone (COMTAN) 200 mg tablet, take 1 tablet by mouth four times a day, Disp: 120 tablet, Rfl: 5    fluticasone (FLONASE) 50 mcg/act nasal spray, 1 spray into each nostril daily, Disp: ,  "Rfl:     lisinopril (ZESTRIL) 20 mg tablet, Take 1 tablet (20 mg total) by mouth daily, Disp: 90 tablet, Rfl: 1    meloxicam (Mobic) 15 mg tablet, Take 1 tablet (15 mg total) by mouth daily, Disp: 30 tablet, Rfl: 5    omega-3-acid ethyl esters (LOVAZA) 1 g capsule, Take 2 capsules (2 g total) by mouth 2 (two) times a day, Disp: 360 capsule, Rfl: 1    ramelteon (ROZEREM) 8 mg tablet, Take 1 tablet (8 mg total) by mouth daily at bedtime, Disp: 30 tablet, Rfl: 5    rosuvastatin (CRESTOR) 10 MG tablet, Take 1 tablet (10 mg total) by mouth daily, Disp: 90 tablet, Rfl: 1  No Known Allergies    Objective   /76 (BP Location: Right arm, Patient Position: Sitting, Cuff Size: Adult)   Pulse 89   Temp 97.7 °F (36.5 °C) (Temporal)   Resp 18   Ht 5' 10\" (1.778 m)   Wt 77.1 kg (170 lb)   SpO2 99%   BMI 24.39 kg/m²    Physical Exam  Vitals reviewed.   HENT:      Head: Normocephalic.   Cardiovascular:      Rate and Rhythm: Normal rate and regular rhythm.   Pulmonary:      Effort: Pulmonary effort is normal.   Musculoskeletal:      Cervical back: Neck supple.   Skin:     Findings: No rash.   Neurological:      Mental Status: He is alert.         Result Review  Labs:  Appointment on 10/24/2024   Component Date Value Ref Range Status    WBC 10/24/2024 3.92 (L)  4.31 - 10.16 Thousand/uL Final    RBC 10/24/2024 5.05  3.88 - 5.62 Million/uL Final    Hemoglobin 10/24/2024 15.2  12.0 - 17.0 g/dL Final    Hematocrit 10/24/2024 45.0  36.5 - 49.3 % Final    MCV 10/24/2024 89  82 - 98 fL Final    MCH 10/24/2024 30.1  26.8 - 34.3 pg Final    MCHC 10/24/2024 33.8  31.4 - 37.4 g/dL Final    RDW 10/24/2024 12.1  11.6 - 15.1 % Final    MPV 10/24/2024 10.7  8.9 - 12.7 fL Final    Platelets 10/24/2024 205  149 - 390 Thousands/uL Final    nRBC 10/24/2024 0  /100 WBCs Final    Segmented % 10/24/2024 58  43 - 75 % Final    Immature Grans % 10/24/2024 0  0 - 2 % Final    Lymphocytes % 10/24/2024 28  14 - 44 % Final    Monocytes % 10/24/2024 " 11  4 - 12 % Final    Eosinophils Relative 10/24/2024 2  0 - 6 % Final    Basophils Relative 10/24/2024 1  0 - 1 % Final    Absolute Neutrophils 10/24/2024 2.26  1.85 - 7.62 Thousands/µL Final    Absolute Immature Grans 10/24/2024 0.01  0.00 - 0.20 Thousand/uL Final    Absolute Lymphocytes 10/24/2024 1.08  0.60 - 4.47 Thousands/µL Final    Absolute Monocytes 10/24/2024 0.44  0.17 - 1.22 Thousand/µL Final    Eosinophils Absolute 10/24/2024 0.09  0.00 - 0.61 Thousand/µL Final    Basophils Absolute 10/24/2024 0.04  0.00 - 0.10 Thousands/µL Final       Imaging:   I reviewed relevant imaging    Please note:  This report has been generated by a voice recognition software system. Therefore there may be syntax, spelling, and/or grammatical errors. Please call if you have any questions.

## 2024-11-07 DIAGNOSIS — G20.A1 PARKINSON'S DISEASE (HCC): ICD-10-CM

## 2024-11-07 RX ORDER — ENTACAPONE 200 MG/1
200 TABLET ORAL 4 TIMES DAILY
Qty: 120 TABLET | Refills: 5 | Status: SHIPPED | OUTPATIENT
Start: 2024-11-07

## 2024-11-13 DIAGNOSIS — M16.12 OSTEOARTHRITIS OF LEFT HIP, UNSPECIFIED OSTEOARTHRITIS TYPE: ICD-10-CM

## 2024-11-14 RX ORDER — MELOXICAM 15 MG/1
15 TABLET ORAL DAILY
Qty: 30 TABLET | Refills: 5 | Status: SHIPPED | OUTPATIENT
Start: 2024-11-14

## 2024-11-18 DIAGNOSIS — G20.A1 PARKINSON'S DISEASE (HCC): ICD-10-CM

## 2024-11-19 RX ORDER — CARBIDOPA/LEVODOPA 25MG-250MG
1 TABLET ORAL 4 TIMES DAILY
Qty: 360 TABLET | Refills: 1 | Status: SHIPPED | OUTPATIENT
Start: 2024-11-19

## 2024-11-29 DIAGNOSIS — I10 PRIMARY HYPERTENSION: ICD-10-CM

## 2024-11-29 DIAGNOSIS — E78.2 MIXED HYPERLIPIDEMIA: ICD-10-CM

## 2024-11-29 RX ORDER — OMEGA-3-ACID ETHYL ESTERS 1 G/1
2 CAPSULE, LIQUID FILLED ORAL 2 TIMES DAILY
Qty: 360 CAPSULE | Refills: 1 | Status: SHIPPED | OUTPATIENT
Start: 2024-11-29

## 2024-11-29 RX ORDER — LISINOPRIL 20 MG/1
20 TABLET ORAL DAILY
Qty: 90 TABLET | Refills: 1 | Status: SHIPPED | OUTPATIENT
Start: 2024-11-29

## 2024-11-29 RX ORDER — ROSUVASTATIN CALCIUM 10 MG/1
10 TABLET, COATED ORAL DAILY
Qty: 90 TABLET | Refills: 1 | Status: SHIPPED | OUTPATIENT
Start: 2024-11-29

## 2024-12-09 DIAGNOSIS — E55.9 VITAMIN D INSUFFICIENCY: ICD-10-CM

## 2024-12-11 RX ORDER — CHOLECALCIFEROL (VITAMIN D3) 25 MCG
1000 TABLET ORAL DAILY
Qty: 90 TABLET | Refills: 1 | Status: SHIPPED | OUTPATIENT
Start: 2024-12-11

## 2024-12-23 ENCOUNTER — APPOINTMENT (OUTPATIENT)
Dept: LAB | Facility: CLINIC | Age: 44
End: 2024-12-23
Payer: COMMERCIAL

## 2024-12-23 DIAGNOSIS — D72.819 LEUKOPENIA, UNSPECIFIED TYPE: ICD-10-CM

## 2024-12-23 DIAGNOSIS — R63.4 UNINTENDED WEIGHT LOSS: ICD-10-CM

## 2024-12-23 LAB
BASOPHILS # BLD AUTO: 0.04 THOUSAND/UL (ref 0–0.1)
BASOPHILS NFR MAR MANUAL: 1 % (ref 0–1)
EOSINOPHIL # BLD AUTO: 0.13 THOUSAND/UL (ref 0–0.61)
EOSINOPHIL NFR BLD MANUAL: 3 % (ref 0–6)
ERYTHROCYTE [DISTWIDTH] IN BLOOD BY AUTOMATED COUNT: 11.9 % (ref 11.6–15.1)
HCT VFR BLD AUTO: 47.6 % (ref 36.5–49.3)
HGB BLD-MCNC: 15.7 G/DL (ref 12–17)
LDH SERPL-CCNC: 155 U/L (ref 140–271)
LYMPHOCYTES # BLD AUTO: 1.28 THOUSAND/UL (ref 0.6–4.47)
LYMPHOCYTES # BLD AUTO: 30 %
MCH RBC QN AUTO: 29.8 PG (ref 26.8–34.3)
MCHC RBC AUTO-ENTMCNC: 33 G/DL (ref 31.4–37.4)
MCV RBC AUTO: 90 FL (ref 82–98)
MONOCYTES # BLD AUTO: 0.51 THOUSAND/UL (ref 0–1.22)
MONOCYTES NFR BLD AUTO: 12 % (ref 4–12)
NEUTS SEG # BLD: 2.3 THOUSAND/UL (ref 1.81–6.82)
NEUTS SEG NFR BLD AUTO: 54 %
NRBC BLD AUTO-RTO: 0 /100 WBCS
PLATELET # BLD AUTO: 215 THOUSANDS/UL (ref 149–390)
PLATELET BLD QL SMEAR: ADEQUATE
PMV BLD AUTO: 10.8 FL (ref 8.9–12.7)
RBC # BLD AUTO: 5.27 MILLION/UL (ref 3.88–5.62)
RBC MORPH BLD: NORMAL
TOTAL CELLS COUNTED SPEC: 100
WBC # BLD AUTO: 4.25 THOUSAND/UL (ref 4.31–10.16)

## 2024-12-23 PROCEDURE — 88185 FLOWCYTOMETRY/TC ADD-ON: CPT | Performed by: PHYSICIAN ASSISTANT

## 2024-12-23 PROCEDURE — 36415 COLL VENOUS BLD VENIPUNCTURE: CPT

## 2024-12-23 PROCEDURE — 83615 LACTATE (LD) (LDH) ENZYME: CPT

## 2024-12-23 PROCEDURE — 88184 FLOWCYTOMETRY/ TC 1 MARKER: CPT

## 2024-12-23 PROCEDURE — 85007 BL SMEAR W/DIFF WBC COUNT: CPT

## 2024-12-24 ENCOUNTER — RESULTS FOLLOW-UP (OUTPATIENT)
Dept: HEMATOLOGY ONCOLOGY | Facility: CLINIC | Age: 44
End: 2024-12-24

## 2024-12-26 LAB — SCAN RESULT: NORMAL

## 2025-01-07 ENCOUNTER — OFFICE VISIT (OUTPATIENT)
Dept: HEMATOLOGY ONCOLOGY | Facility: CLINIC | Age: 45
End: 2025-01-07
Payer: COMMERCIAL

## 2025-01-07 VITALS
BODY MASS INDEX: 23.91 KG/M2 | HEART RATE: 110 BPM | SYSTOLIC BLOOD PRESSURE: 112 MMHG | OXYGEN SATURATION: 99 % | WEIGHT: 167 LBS | DIASTOLIC BLOOD PRESSURE: 80 MMHG | TEMPERATURE: 98 F | HEIGHT: 70 IN

## 2025-01-07 DIAGNOSIS — R59.0 CERVICAL LYMPHADENOPATHY: ICD-10-CM

## 2025-01-07 DIAGNOSIS — D72.818 OTHER DECREASED WHITE BLOOD CELL (WBC) COUNT: Primary | ICD-10-CM

## 2025-01-07 PROBLEM — D72.819 LEUKOPENIA: Status: ACTIVE | Noted: 2025-01-07

## 2025-01-07 PROBLEM — D70.9 NEUTROPENIA (HCC): Status: ACTIVE | Noted: 2025-01-07

## 2025-01-07 PROCEDURE — G2211 COMPLEX E/M VISIT ADD ON: HCPCS | Performed by: PHYSICIAN ASSISTANT

## 2025-01-07 PROCEDURE — 99213 OFFICE O/P EST LOW 20 MIN: CPT | Performed by: PHYSICIAN ASSISTANT

## 2025-01-07 NOTE — ASSESSMENT & PLAN NOTE
New since 03/2024  B12 deficiency treated w/o improvement   Peripheral flow cytometry, HIV, hepatitis, CRP were unrevealing. He does have unintentional weight loss and complaints of dysphagia. Referred to GI however he never followed up as his dysphagia resolved  Leukopenia is stable.  May be side effect from his Rozerem however given his unintentional weight loss, bone marrow biopsy is recommended.  RTC 6 weeks to review results   Again follow-up with GI as recommended.  He has never had a colonoscopy  Orders:    IR biopsy bone marrow; Future    Bone Marrow Biopsy/Aspirate Exam; Future

## 2025-01-07 NOTE — PROGRESS NOTES
Name: Ralph Broadbent Jr.      : 1980      MRN: [id number]  Encounter Provider: Amanda Wilkins PA-C  Encounter Date: 2025   Encounter department[facility]  :  44-year-old male who is seen in follow up today for leukopenia which is new as of 2024.  He recently was found to have low B12 and was started on oral B12 therapy.  Previous peripheral flow cytometry, HIV, hepatitis, CRP were unrevealing. He does have unintentional weight loss and complaints of dysphagia. He was referred to GI however has not met with their team. Following with ENT for sinus cyst.  Most recent labs show WBC 4.25, hemoglobin 15.7, MCV 90, platelets 215,000.  Peripheral smear unremarkable.  LD and repeat peripheral flow cytometry were normal.  Assessment & Plan  Other decreased white blood cell (WBC) count  New since 2024  B12 deficiency treated w/o improvement   Peripheral flow cytometry, HIV, hepatitis, CRP were unrevealing. He does have unintentional weight loss and complaints of dysphagia. Referred to GI however he never followed up as his dysphagia resolved  Leukopenia is stable.  May be side effect from his Rozerem however given his unintentional weight loss, bone marrow biopsy is recommended.  RTC 6 weeks to review results   Again follow-up with GI as recommended.  He has never had a colonoscopy  Orders:    IR biopsy bone marrow; Future    Bone Marrow Biopsy/Aspirate Exam; Future    Cervical lymphadenopathy  Reported by patient.  Resolved, no palpable lymphadenopathy on today's exam.  Orders:    IR biopsy bone marrow; Future      History of Present Illness   Chief Complaint   Patient presents with    Follow-up     43-year-old male with history of Parkinson disease who has been referred by PCP for evaluation of leukopenia.     1. Leukopenia   New as of 2024 with wbc 4.12 and normal differential. ANDREA, RF, Lyme testing negative. B12 247 and was started on oral  b12 therapy. Folate and iron panel were normal. Follow up labs 07/31/2024 show worsening leukopenia now with ANC 1.77.      Patient denies frequent infection, fever, night sweats, lymphadenopathy.  He has had recent unintentional weight loss (see below).  History of liver disease, splenomegaly, history or autoimmune disease.  He had negative HIV and hepatitis testing a couple months ago.  He recently was found to have B12 deficiency was started on oral B12 replacement. Vapes nicotine. Social alcohol use No drug use      2. Weight loss  Over the past year, he has lost 10lb. He has not had not had any changes in diet or medication over the past. Eating pattern is about the same. No diarrhea.   TSH HIV, Hepatitis C, PSA screen and Hemoccult study was normal.   CT c/a/p performed by PCP 08/02/2024 showed remote granulomatous disease. No evidence of any masses or malignancy. He has a known maxillary sinus cysts which is being evaluated by ENT. Planning to have dediated CT study completed for further evaluation      Medication changes???  He has never had colonoscopy      3. Parksions disease   He was diagnosed at [facility] with Parkinson at the age of 30. He had extensive work up including LP.     Paternal breast cancer, father had lung cance    Pertinent Medical History   01/20/25:   he felt lump in neck after shaving last week. Has had fatigue   No fevers, he has intermittent sweating at night- under armpits and around neck       Review of Systems   Constitutional:  Positive for diaphoresis (at night), fatigue and unexpected weight change. Negative for fever.   HENT:  Positive for nosebleeds (has sinus cysts).         No longer having trouble swallowing   Eyes:  Negative for visual disturbance.   Respiratory:  Negative for shortness of breath.    Cardiovascular:  Negative for chest pain and palpitations.   Gastrointestinal:  Negative for abdominal pain, blood in stool, diarrhea, nausea and vomiting.         "No melena     Skin:  Negative for rash.   Neurological:  Negative for headaches.   Hematological:  Positive for adenopathy.   All other systems reviewed and are negative.          Objective   /80 (BP Location: Left arm, Patient Position: Standing, Cuff Size: Standard)   Pulse (!) 110   Temp 98 °F (36.7 °C) (Temporal)   Ht 5' 10\" (1.778 m)   Wt 75.8 kg (167 lb)   SpO2 99%   BMI 23.96 kg/m²     Physical Exam  Vitals reviewed.   HENT:      Head: Normocephalic.   Cardiovascular:      Rate and Rhythm: Normal rate and regular rhythm.   Pulmonary:      Effort: Pulmonary effort is normal.      Breath sounds: Normal breath sounds.   Abdominal:      Palpations: Abdomen is soft.      Tenderness: There is no abdominal tenderness.   Musculoskeletal:      Cervical back: Neck supple.   Lymphadenopathy:      Cervical: No cervical adenopathy.      Upper Body:      Right upper body: No supraclavicular or axillary adenopathy.      Left upper body: No supraclavicular or axillary adenopathy.   Skin:     Findings: No rash.   Neurological:      Mental Status: He is alert.         Labs: I have reviewed the following labs:  Results for orders placed or performed in visit on 12/23/24   CBC and differential   Result Value Ref Range    WBC 4.25 (L) 4.31 - 10.16 Thousand/uL    RBC 5.27 3.88 - 5.62 Million/uL    Hemoglobin 15.7 12.0 - 17.0 g/dL    Hematocrit 47.6 36.5 - 49.3 %    MCV 90 82 - 98 fL    MCH 29.8 26.8 - 34.3 pg    MCHC 33.0 31.4 - 37.4 g/dL    RDW 11.9 11.6 - 15.1 %    MPV 10.8 8.9 - 12.7 fL    Platelets 215 149 - 390 Thousands/uL    nRBC 0 /100 WBCs   Peripheral Smear   Result Value Ref Range    Total Counted 100     Segmented % 54 %    Lymphocytes % 30 %    Monocytes % 12 4 - 12 %    Eosinophils % 3 0 - 6 %    Basophils % 1 0 - 1 %    Absolute Neutrophils 2.30 1.81 - 6.82 Thousand/uL    Absolute Lymphocytes 1.28 0.60 - 4.47 Thousand/uL    Absolute Monocytes 0.51 0.00 - 1.22 Thousand/uL    Absolute Eosinophils 0.13 0.00 " - 0.61 Thousand/uL    Absolute Basophils 0.04 0.00 - 0.10 Thousand/uL    RBC Morphology Normal     Platelet Estimate Adequate Adequate   LD,Blood   Result Value Ref Range     140 - 271 U/L   Leukemia/Lymphoma flow cytometry   Result Value Ref Range    Scan Result SEE WRITTEN REPORT

## 2025-01-08 ENCOUNTER — HOSPITAL ENCOUNTER (OUTPATIENT)
Dept: ULTRASOUND IMAGING | Facility: HOSPITAL | Age: 45
Discharge: HOME | End: 2025-01-08
Payer: COMMERCIAL

## 2025-01-08 DIAGNOSIS — D72.818 OTHER DECREASED WHITE BLOOD CELL (WBC) COUNT: ICD-10-CM

## 2025-01-08 DIAGNOSIS — R59.0 CERVICAL LYMPHADENOPATHY: ICD-10-CM

## 2025-01-08 PROCEDURE — 76536 US EXAM OF HEAD AND NECK: CPT

## 2025-01-13 ENCOUNTER — RESULTS FOLLOW-UP (OUTPATIENT)
Dept: HEMATOLOGY ONCOLOGY | Facility: CLINIC | Age: 45
End: 2025-01-13

## 2025-01-22 ENCOUNTER — HOSPITAL ENCOUNTER (OUTPATIENT)
Dept: CT IMAGING | Facility: HOSPITAL | Age: 45
Discharge: HOME | End: 2025-01-22
Payer: COMMERCIAL

## 2025-01-22 VITALS
TEMPERATURE: 97.5 F | OXYGEN SATURATION: 99 % | HEART RATE: 81 BPM | WEIGHT: 166 LBS | RESPIRATION RATE: 16 BRPM | BODY MASS INDEX: 23.82 KG/M2 | DIASTOLIC BLOOD PRESSURE: 75 MMHG | SYSTOLIC BLOOD PRESSURE: 117 MMHG

## 2025-01-22 DIAGNOSIS — R59.0 CERVICAL LYMPHADENOPATHY: ICD-10-CM

## 2025-01-22 DIAGNOSIS — D72.818 OTHER DECREASED WHITE BLOOD CELL (WBC) COUNT: ICD-10-CM

## 2025-01-22 LAB
ERYTHROCYTE [DISTWIDTH] IN BLOOD BY AUTOMATED COUNT: 12 % (ref 11.6–15.1)
HCT VFR BLD AUTO: 44 % (ref 36.5–49.3)
HGB BLD-MCNC: 15.3 G/DL (ref 12–17)
MCH RBC QN AUTO: 30.8 PG (ref 26.8–34.3)
MCHC RBC AUTO-ENTMCNC: 34.8 G/DL (ref 31.4–37.4)
MCV RBC AUTO: 89 FL (ref 82–98)
NRBC BLD AUTO-RTO: 0 /100 WBCS
PLATELET # BLD AUTO: 207 THOUSANDS/UL (ref 149–390)
PMV BLD AUTO: 9.8 FL (ref 8.9–12.7)
RBC # BLD AUTO: 4.97 MILLION/UL (ref 3.88–5.62)
WBC # BLD AUTO: 4.01 THOUSAND/UL (ref 4.31–10.16)

## 2025-01-22 PROCEDURE — 88185 FLOWCYTOMETRY/TC ADD-ON: CPT | Performed by: PHYSICIAN ASSISTANT

## 2025-01-22 PROCEDURE — 77012 CT SCAN FOR NEEDLE BIOPSY: CPT

## 2025-01-22 PROCEDURE — C1830 POWER BONE MARROW BX NEEDLE: HCPCS

## 2025-01-22 PROCEDURE — 88264 CHROMOSOME ANALYSIS 20-25: CPT | Performed by: PHYSICIAN ASSISTANT

## 2025-01-22 PROCEDURE — 99152 MOD SED SAME PHYS/QHP 5/>YRS: CPT

## 2025-01-22 PROCEDURE — 38222 DX BONE MARROW BX & ASPIR: CPT

## 2025-01-22 PROCEDURE — 88237 TISSUE CULTURE BONE MARROW: CPT | Performed by: PHYSICIAN ASSISTANT

## 2025-01-22 PROCEDURE — 88184 FLOWCYTOMETRY/ TC 1 MARKER: CPT | Performed by: PHYSICIAN ASSISTANT

## 2025-01-22 PROCEDURE — 99153 MOD SED SAME PHYS/QHP EA: CPT

## 2025-01-22 RX ORDER — FENTANYL CITRATE 50 UG/ML
INJECTION, SOLUTION INTRAMUSCULAR; INTRAVENOUS AS NEEDED
Status: COMPLETED | OUTPATIENT
Start: 2025-01-22 | End: 2025-01-22

## 2025-01-22 RX ORDER — MIDAZOLAM HYDROCHLORIDE 2 MG/2ML
INJECTION, SOLUTION INTRAMUSCULAR; INTRAVENOUS AS NEEDED
Status: COMPLETED | OUTPATIENT
Start: 2025-01-22 | End: 2025-01-22

## 2025-01-22 RX ORDER — DIAZEPAM 10 MG/2ML
INJECTION, SOLUTION INTRAMUSCULAR; INTRAVENOUS AS NEEDED
Status: COMPLETED | OUTPATIENT
Start: 2025-01-22 | End: 2025-01-22

## 2025-01-22 RX ORDER — LIDOCAINE WITH 8.4% SOD BICARB 0.9%(10ML)
SYRINGE (ML) INJECTION AS NEEDED
Status: COMPLETED | OUTPATIENT
Start: 2025-01-22 | End: 2025-01-22

## 2025-01-22 RX ADMIN — FENTANYL CITRATE 25 MCG: 50 INJECTION INTRAMUSCULAR; INTRAVENOUS at 09:26

## 2025-01-22 RX ADMIN — Medication 10 ML: at 09:06

## 2025-01-22 RX ADMIN — MIDAZOLAM 0.5 MG: 1 INJECTION INTRAMUSCULAR; INTRAVENOUS at 09:11

## 2025-01-22 RX ADMIN — FENTANYL CITRATE 50 MCG: 50 INJECTION INTRAMUSCULAR; INTRAVENOUS at 09:07

## 2025-01-22 RX ADMIN — MIDAZOLAM 1 MG: 1 INJECTION INTRAMUSCULAR; INTRAVENOUS at 09:07

## 2025-01-22 RX ADMIN — MIDAZOLAM 0.5 MG: 1 INJECTION INTRAMUSCULAR; INTRAVENOUS at 09:19

## 2025-01-22 RX ADMIN — FENTANYL CITRATE 25 MCG: 50 INJECTION INTRAMUSCULAR; INTRAVENOUS at 09:11

## 2025-01-22 NOTE — BRIEF OP NOTE (RAD/CATH)
INTERVENTIONAL RADIOLOGY PROCEDURE NOTE    Date: 1/22/2025    Procedure:   Procedure Summary       Date: 01/22/25 Room / Location: [facility] CAT Scan    Anesthesia Start:  Anesthesia Stop:     Procedure: IR BIOPSY BONE MARROW Diagnosis:       Other decreased white blood cell (WBC) count      Cervical lymphadenopathy      (44yr old male with leukopenia and unexplained weight loss, night sweats)    Scheduled Providers:  Responsible Provider:     Anesthesia Type: Not recorded ASA Status: Not recorded            Preoperative diagnosis:   1. Other decreased white blood cell (WBC) count    2. Cervical lymphadenopathy         Postoperative diagnosis: Same.    Surgeon: Jamison Damon MD     Assistant: None. No qualified resident was available.    Blood loss: Minimal    Specimens: 6 cc bone marrow, 1 bone core     Findings: BM biopsy    Complications: None immediate.    Anesthesia: conscious sedation

## 2025-01-22 NOTE — DISCHARGE INSTRUCTIONS
Bone Marrow Biopsy     WHAT YOU NEED TO KNOW:   A bone marrow biopsy is a procedure to remove a small amount of bone marrow from your bone. Bone marrow is the soft tissue inside your bone that helps to make blood cells. The sample is tested for disease or infection.    DISCHARGE INSTRUCTIONS:     1. Limit your activities day of biopsy as directed by your doctor.    2. Use medication as ordered.    3. Return to your normal diet.Small sips of flat soda will help with nausea.    4. Remove band-aid or dressing 24 hours after procedure.    Contact Interventional Radiology at [phone] ([place] PATIENTS: Contact Interventional Radiology at [phone]) ([place] PATIENTS: Contact Interventional Radiology at [phone]) if:    1. Difficulty breathing, nausea or vomiting.    2. Chills or fever above 101 F.    3. Pain at biopsy site not relieved by medication.    4. Develop any redness, swelling, heat, unusual drainage, heavy bruising or bleeding from biopsy site.

## 2025-01-22 NOTE — SEDATION DOCUMENTATION
Bone Thania biopsy complete by Dr. Damon. Patient tolerated procedure well. Band-aid to site. Specimens sent to lab. Patient back to [facility] in stable condition.

## 2025-01-27 ENCOUNTER — RESULTS FOLLOW-UP (OUTPATIENT)
Dept: HEMATOLOGY ONCOLOGY | Facility: CLINIC | Age: 45
End: 2025-01-27

## 2025-01-27 LAB — SCAN RESULT: NORMAL

## 2025-01-31 LAB
MISCELLANEOUS LAB TEST RESULT: NORMAL
SCAN RESULT: NORMAL

## 2025-02-03 LAB — MISCELLANEOUS LAB TEST RESULT: NORMAL

## 2025-02-08 LAB
EOSINOPHIL # BLD AUTO: 0.04 THOUSAND/UL (ref 0–0.61)
EOSINOPHIL NFR BLD MANUAL: 1 % (ref 0–6)
LYMPHOCYTES # BLD AUTO: 1.24 THOUSAND/UL (ref 0.6–4.47)
LYMPHOCYTES # BLD AUTO: 25 %
MONOCYTES # BLD AUTO: 0.32 THOUSAND/UL (ref 0–1.22)
MONOCYTES NFR BLD AUTO: 8 % (ref 4–12)
NEUTS SEG # BLD: 2.41 THOUSAND/UL (ref 1.81–6.82)
NEUTS SEG NFR BLD AUTO: 60 %
PATHOLOGY REVIEW: YES
PLATELET BLD QL SMEAR: ADEQUATE
RBC MORPH BLD: NORMAL
TOTAL CELLS COUNTED SPEC: 100
VARIANT LYMPHS # BLD AUTO: 6 % (ref 0–0)

## 2025-02-12 ENCOUNTER — OFFICE VISIT (OUTPATIENT)
Dept: NEUROLOGY | Facility: CLINIC | Age: 45
End: 2025-02-12
Payer: COMMERCIAL

## 2025-02-12 VITALS
SYSTOLIC BLOOD PRESSURE: 133 MMHG | HEART RATE: 95 BPM | WEIGHT: 161 LBS | DIASTOLIC BLOOD PRESSURE: 76 MMHG | BODY MASS INDEX: 23.1 KG/M2

## 2025-02-12 DIAGNOSIS — G20.A1 PARKINSON'S DISEASE (HCC): ICD-10-CM

## 2025-02-12 DIAGNOSIS — G20.A1 PARKINSON'S DISEASE WITHOUT DYSKINESIA OR FLUCTUATING MANIFESTATIONS (HCC): Primary | Chronic | ICD-10-CM

## 2025-02-12 DIAGNOSIS — G47.9 SLEEP DISTURBANCE: ICD-10-CM

## 2025-02-12 PROCEDURE — G2211 COMPLEX E/M VISIT ADD ON: HCPCS | Performed by: PSYCHIATRY & NEUROLOGY

## 2025-02-12 PROCEDURE — 99213 OFFICE O/P EST LOW 20 MIN: CPT | Performed by: PSYCHIATRY & NEUROLOGY

## 2025-02-12 RX ORDER — CARBIDOPA/LEVODOPA 25MG-250MG
1 TABLET ORAL 4 TIMES DAILY
Qty: 360 TABLET | Refills: 2 | Status: SHIPPED | OUTPATIENT
Start: 2025-02-12

## 2025-02-12 RX ORDER — RAMELTEON 8 MG/1
8 TABLET ORAL
Qty: 30 TABLET | Refills: 5 | Status: SHIPPED | OUTPATIENT
Start: 2025-02-12

## 2025-02-12 RX ORDER — ENTACAPONE 200 MG/1
200 TABLET ORAL 4 TIMES DAILY
Qty: 120 TABLET | Refills: 5 | Status: SHIPPED | OUTPATIENT
Start: 2025-02-12

## 2025-02-12 NOTE — ASSESSMENT & PLAN NOTE
Parkinsonian symptoms with rare breakthrough tremor.  This is manageable.  He denies any wearing off.  Overall able to function fairly independently.    Will continue on carbidopa/levodopa 25/254 times daily along with entacapone with each dose.    We discussed options should symptoms continue to progress and to develop more off time.  Could consider additional medication such as Nourianz, switching to opicapone, switching to a longer acting formulation of carbidopa/levodopa.    Courage continue with physical activity and exercise.

## 2025-02-12 NOTE — ASSESSMENT & PLAN NOTE
Orders:    carbidopa-levodopa (SINEMET)  mg per tablet; Take 1 tablet by mouth 4 (four) times a day    ramelteon (ROZEREM) 8 mg tablet; Take 1 tablet (8 mg total) by mouth daily at bedtime    entacapone (COMTAN) 200 mg tablet; Take 1 tablet (200 mg total) by mouth 4 (four) times a day

## 2025-02-12 NOTE — PROGRESS NOTES
Review of Systems   Constitutional:  Positive for fatigue (Ongoing). Negative for appetite change and fever.   HENT: Negative.  Negative for hearing loss, tinnitus, trouble swallowing and voice change.    Eyes: Negative.  Negative for photophobia, pain and visual disturbance.   Respiratory: Negative.  Negative for shortness of breath.    Cardiovascular: Negative.  Negative for palpitations.   Gastrointestinal: Negative.  Negative for nausea and vomiting.   Endocrine: Negative.  Negative for cold intolerance.   Genitourinary: Negative.  Negative for dysuria, frequency and urgency.   Musculoskeletal:  Positive for myalgias (Stiffness in Legs and Arms). Negative for back pain, gait problem, neck pain and neck stiffness.   Skin: Negative.  Negative for rash.   Allergic/Immunologic: Negative.    Neurological:  Positive for tremors (Hands, has stayed the same). Negative for dizziness, seizures, syncope, facial asymmetry, speech difficulty, weakness, light-headedness, numbness and headaches.   Hematological: Negative.  Does not bruise/bleed easily.   Psychiatric/Behavioral:  Positive for sleep disturbance (At times). Negative for confusion and hallucinations.    All other systems reviewed and are negative.

## 2025-02-12 NOTE — ASSESSMENT & PLAN NOTE
Insomnia and sleep initiation is better controlled on Rozerem.   Orders:    ramelteon (ROZEREM) 8 mg tablet; Take 1 tablet (8 mg total) by mouth daily at bedtime

## 2025-02-12 NOTE — PROGRESS NOTES
Name: Ralph Broadbent Jr.      : 1980      MRN: [id number]  Encounter Provider: Shanice Cardona MD  Encounter Date: 2025   Encounter department: [facility] [place]  :  Assessment & Plan  Parkinson's disease without dyskinesia or fluctuating manifestations (HCC)  Parkinsonian symptoms with rare breakthrough tremor.  This is manageable.  He denies any wearing off.  Overall able to function fairly independently.    Will continue on carbidopa/levodopa 25/254 times daily along with entacapone with each dose.    We discussed options should symptoms continue to progress and to develop more off time.  Could consider additional medication such as Nourianz, switching to opicapone, switching to a longer acting formulation of carbidopa/levodopa.    Courage continue with physical activity and exercise.       Sleep disturbance  Insomnia and sleep initiation is better controlled on Rozerem.   Orders:    ramelteon (ROZEREM) 8 mg tablet; Take 1 tablet (8 mg total) by mouth daily at bedtime    Parkinson's disease (HCC) - early onset    Orders:    carbidopa-levodopa (SINEMET)  mg per tablet; Take 1 tablet by mouth 4 (four) times a day    ramelteon (ROZEREM) 8 mg tablet; Take 1 tablet (8 mg total) by mouth daily at bedtime    entacapone (COMTAN) 200 mg tablet; Take 1 tablet (200 mg total) by mouth 4 (four) times a day          History of Present Illness   Micah Amaya is a 44 year old with hypertension, hypertriglyceridemia, osteoarthritis, and Young onset Parkinson's disease who presents for movement disorder follow up.    Review of initial history: Parkinson disease was diagnosed around 2011 at [place] with Dr. Mcfadden.  Prior to this he had a 5-year history of unexplained neurological symptoms including paresthesias of all 4 limbs.  Work-up (including LP) at [facility] for Lyme disease was unremarkable.  He tried various medications for neuropathic pain including  gabapentin,  Lyrica, Flexeril, and Cymbalta.  Symptoms progressed to include fatigue, muscle aches, left shoulder pain, and a hand tremor.  Trials of primidone and topiramate were ineffective.  By 2011 he developed a reduction in left arm swing.  Rasagiline started in 2011 with improvement in fatigue, stiffness and pain.  He under the care of Dr. Cooper Velazquez in [place] since 2014.  Last visit was May 2022.  There is a family history of young onset Parkinson's disease which includes his paternal great grandmother of Japanese descent who was diagnosed at age 30, great grandmother lived until 86, great paternal uncle. Previously worked as a .  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      Tremor noted when stressed and occasionally at rest.   No clear wearing off.   Speech is soft. There is nighttime drooling. No difficulty chewing and swallowing.   Dressing independently with more effort.   Hygiene acts performed independently without difficulty  There is no difficulty arising out of chair.   No issues with gait.     Sleep is better than prior. He is can have trouble falling back to sleep if awakens.  No dream enactment.    There are no issues with urination. Mild constipation. He tries to eat fiber.    He can be lightheadedness on standing first thing in the am.   He can be forgetful with names. He will make list.  Finances managed with his wife. Only allowed to drive short distances.   No hallucinations.      Current PD related medications:  Carbidopa/levodopa 25/250 grams 1 tablet 4 times daily (8am, 12pm, 4pm and 9pm)- nausea   Entacapone 200 mg 1 tablet 4 times daily  Ramelteon 8mg nightly     For depression:  Mirtazepine 15mg qhs  Hydroxyzine 50 mg nightly    Prior medication trials:  Rasagiline  Amantadine-no improvement  Trihexyphenidyl-ineffective  Pramipexole-show improvement, but  associated with sedation while driving  Neupro patch  Provigil for daytime sedation  Stalevo  Melatonin  Ropinirole XL-discontinued due to cost  Trazodone    Bupropion 75 mg twice daily    Baclofen 10 mg 3 times daily prn rarely used       Review of Systems I have personally reviewed the MA's review of systems and made changes as necessary.         Objective   /76 (BP Location: Left arm, Patient Position: Standing, Cuff Size: Large)   Pulse 95   Wt 73 kg (161 lb)   BMI 23.10 kg/m²     Physical Exam  Vitals reviewed.   Eyes:      Extraocular Movements: Extraocular movements intact.      Pupils: Pupils are equal, round, and reactive to light.   Neurological:      Mental Status: He is alert.      Motor: Motor strength is normal.      Neurological Exam  Mental Status  Alert. Oriented to person, place, time and situation. Speech: hypophonia. Language is fluent with no aphasia. Attention and concentration are normal.    Cranial Nerves  CN III, IV, VI: Extraocular movements intact bilaterally. Pupils equal round and reactive to light bilaterally.  CN VII: Full and symmetric facial movement.  CN VIII: Hearing is normal.  CN IX, X: Palate elevates symmetrically  CN XI: Shoulder shrug strength is normal.  CN XII: Tongue midline without atrophy or fasciculations.    Motor   Normal muscle tone. Strength is 5/5 throughout all four extremities.    Sensory  Light touch is normal in upper and lower extremities.     Coordination    See motor UPDRS.    Gait  Casual gait: Able to rise from chair without using arms.      MDS UPDRS III                              2/12/24 4/25/24   Time since last dose:        Speech  1 1 1   Facial Expression  1 2 1   Rigidity - Neck   0 0   Rigidity - Upper Extremity (R)  0 1 0   Rigidity - Upper Extremity (L)   1 1 1   Rigidity - Lower Extremity (R)  0 0 0   Rigidity - Lower Extremity (L)   0 0 0   Finger Taps (R)   2 2 2   Finger Taps (L)   2> 2 2>   Hand Movement (R)  1 1 0   Hand  Movement (L)   2 1 1   Pronation/Supination (R)  1 1 1   Pronation/Supination (L)   1 2 2   Toe Tapping (R) 0 1 1   Toe Tapping (L) 1 1 1   Leg Agility (R)  0 0 0   Leg Agility (L)   0 0 0   Arising from Chair   0 0 0   Gait   1 1 1   Freezing of Gait 0 0 0   Postural Stability   0   0   Posture 0 0 0   Global spontaneity of movement 1 1 0   Postural Tremor (Ri 2 2 2   Postural Tremor (L) 2 1 1   Kinetic Tremor (R)  2 2 1   Kinetic Tremor (L)  1 1 1   Rest tremor amplitude RUE 0 0 0   Rest tremor amplitude LUE 0 0 0   Rest tremor amplitude RLE 0 0 0   Reset tremor amplitude LLE 0 0 0   Lip/Jaw Tremor  0 0 0   Consistency of tremor 0 0 0   Motor Exam

## 2025-02-17 ENCOUNTER — OFFICE VISIT (OUTPATIENT)
Dept: HEMATOLOGY ONCOLOGY | Facility: CLINIC | Age: 45
End: 2025-02-17
Payer: COMMERCIAL

## 2025-02-17 VITALS
OXYGEN SATURATION: 97 % | BODY MASS INDEX: 23.12 KG/M2 | HEIGHT: 70 IN | HEART RATE: 96 BPM | TEMPERATURE: 98.1 F | DIASTOLIC BLOOD PRESSURE: 86 MMHG | WEIGHT: 161.5 LBS | SYSTOLIC BLOOD PRESSURE: 122 MMHG

## 2025-02-17 DIAGNOSIS — D72.818 OTHER DECREASED WHITE BLOOD CELL (WBC) COUNT: Primary | ICD-10-CM

## 2025-02-17 DIAGNOSIS — J34.89 MAXILLARY SINUS MASS: ICD-10-CM

## 2025-02-17 DIAGNOSIS — R63.4 UNINTENDED WEIGHT LOSS: ICD-10-CM

## 2025-02-17 DIAGNOSIS — C31.0: ICD-10-CM

## 2025-02-17 PROCEDURE — 99214 OFFICE O/P EST MOD 30 MIN: CPT | Performed by: PHYSICIAN ASSISTANT

## 2025-02-17 NOTE — PROGRESS NOTES
Name: Ralph Broadbent Jr.      : 1980      MRN: [id number]  Encounter Provider: Amanda Wilkins PA-C  Encounter Date: 2025   Enc[facility]  :  Assessment & Plan  Other decreased white blood cell (WBC) count  New since 2024  B12 deficiency treated w/o improvement   Peripheral flow cytometry, HIV, hepatitis, CRP were unrevealing. He does have unintentional weight loss and complaints of dysphagia. Referred to GI however he never followed up as his dysphagia resolved  Leukopenia is stable  BMBx report is not finalized however I reached out to our pathologist Dr. Fritz who gave verbal report that bone marrow biopsy was largely unremarkable and there were no findings to explain the patients weight loss.   RTC 3m        Unintended weight loss  20lb weight lost over past 1 year, 5lb weight loss since last office visit. No recent change in meds.   CT CAP 2024 unrevealing although did not include enteric contrast   BMBX non diagnostic   Case reviewed with Dr. Mckinney   He has nausea daily in AM since starting his Parkisons medication without new abdominal complaints however given unclear etiology for weight loss, recommend GI evaluation for consideration of endoscopy +/- colonoscopy   He has also been following with ENT for chronic congestion, intermittent epistaxis. He was found to have bilateral maxillary sinus lesions most c/w with mucous retention cysts with internal inspissated secretions however there is some abnormal hypoattenuation on sinus MRI and tumor is in differential   We will attempt to PET scan to evaluate maxillary sinus for hypermetabolic activity. If Increased FGD activity, will need biopsy. Message sent to ENT provider.   I am wondering if weight loss is common in patients with Parkinsons disease based on literature review. Sent message to neurologist for input   I advised patient to add nutritional supplements  ie protein shake, increased caloric intake etc to try and maintain his weight.    Orders:    Quantiferon TB Gold Plus Assay; Future    NM PET CT skull base to mid thigh; Future    I have spent a total time of 36 minutes in caring for this patient on the day of the visit/encounter including Patient and family education, Impressions, Documenting in the medical record, Obtaining or reviewing history  , and Communicating with other healthcare professionals .      No follow-ups on file.    History of Present Illness   Chief Complaint   Patient presents with    Follow-up     6 Week Follow Up       43-year-old male with history of Parkinson disease who has been referred by PCP for evaluation of leukopenia.     1. Leukopenia   New as of 03/2024 with wbc 4.12 and normal differential. ANDREA, RF, Lyme testing negative. B12 247 and was started on oral b12 therapy. Folate and iron panel were normal. Follow up labs 07/31/2024 show worsening leukopenia now with ANC 1.77.      Patient denies frequent infection, fever, night sweats, lymphadenopathy.  He has had recent unintentional weight loss (see below).  History of liver disease, splenomegaly, history or autoimmune disease.  He had negative HIV and hepatitis testing a couple months ago.  He recently was found to have B12 deficiency was started on oral B12 replacement. Vapes nicotine. Social alcohol use No drug use      2. Weight loss  Over the past year, he has lost 10lb. He has not had not had any changes in diet or medication over the past. Eating pattern is about the same. No diarrhea.   TSH HIV, Hepatitis C, PSA screen and Hemoccult study was normal.   CT c/a/p performed by PCP 08/02/2024 showed remote granulomatous disease. No evidence of any masses or malignancy. He has a known maxillary sinus cysts which is being evaluated by ENT. Planning to have dedicated CT study completed for further evaluation     He has never had colonoscopy      3. Parksions disease   He was diagnosed  at [facility] with Parkinson at the age of 30. He had extensive work up including LP.     Paternal aunt breast cancer, father had lung cancer.    Pertinent Medical History   01/20/25:   he felt lump in neck after shaving last week. Has had fatigue   No fevers, he has intermittent sweating at night- under armpits and around neck       Review of Systems   Constitutional:  Positive for diaphoresis (at night), fatigue and unexpected weight change. Negative for fever.   HENT:  Positive for nosebleeds (has sinus cysts).         No longer having trouble swallowing   Eyes:  Negative for visual disturbance.   Respiratory:  Negative for shortness of breath.    Cardiovascular:  Negative for chest pain and palpitations.   Gastrointestinal:  Negative for abdominal pain, blood in stool, diarrhea, nausea and vomiting.        No melena     Skin:  Negative for rash.   Neurological:  Negative for headaches.   Hematological:  Positive for adenopathy.   All other systems reviewed and are negative.            02/17/25: has continues to lose weight   Has nausea every day for past 10 years per pt. It is not interfering with ability to eat as only occurs when he first wakes up in am. No abdominal pain, hematochezia, melena, change In bowel habits.      Review of Systems   Constitutional:  Negative for fatigue, fever and unexpected weight change.        Feels damp at night, random. Non-drenching. Maybe less than once per week     HENT:  Positive for congestion and nosebleeds (Blood streaked with nasal discharge).         No dysphagia      Respiratory:  Negative for shortness of breath.    Cardiovascular:  Negative for chest pain.   Gastrointestinal:  Negative for abdominal pain, blood in stool, diarrhea and nausea (in am).   Genitourinary:  Negative for difficulty urinating and hematuria.   Skin:  Negative for rash.   Hematological:  Negative for adenopathy.   All other systems reviewed and are negative.          Objective   BP  "122/86 (BP Location: Left arm, Patient Position: Sitting)   Pulse 96   Temp 98.1 °F (36.7 °C) (Temporal)   Ht 5' 10\" (1.778 m)   Wt 73.3 kg (161 lb 8 oz)   SpO2 97%   BMI 23.17 kg/m²     Physical Exam  Vitals reviewed.   HENT:      Head: Normocephalic.   Cardiovascular:      Rate and Rhythm: Normal rate and regular rhythm.      Heart sounds: Normal heart sounds.   Pulmonary:      Effort: Pulmonary effort is normal.      Breath sounds: Normal breath sounds.   Abdominal:      Palpations: Abdomen is soft. There is no hepatomegaly or splenomegaly.      Tenderness: There is no abdominal tenderness.   Musculoskeletal:      Cervical back: Neck supple.   Lymphadenopathy:      Cervical: No cervical adenopathy.      Upper Body:      Right upper body: No supraclavicular or axillary adenopathy.      Left upper body: No supraclavicular or axillary adenopathy.   Skin:     Coloration: Skin is not jaundiced.      Findings: No bruising or rash.   Neurological:      Mental Status: He is alert. Mental status is at baseline.   Psychiatric:         Mood and Affect: Mood normal.         Labs: I have reviewed the following labs:  Results for orders placed or performed during the hospital encounter of 01/22/25   CBC and differential   Result Value Ref Range    WBC 4.01 (L) 4.31 - 10.16 Thousand/uL    RBC 4.97 3.88 - 5.62 Million/uL    Hemoglobin 15.3 12.0 - 17.0 g/dL    Hematocrit 44.0 36.5 - 49.3 %    MCV 89 82 - 98 fL    MCH 30.8 26.8 - 34.3 pg    MCHC 34.8 31.4 - 37.4 g/dL    RDW 12.0 11.6 - 15.1 %    MPV 9.8 8.9 - 12.7 fL    Platelets 207 149 - 390 Thousands/uL    nRBC 0 /100 WBCs   Peripheral Smear   Result Value Ref Range    Total Counted 100     Segmented % 60 %    Lymphocytes % 25 %    Monocytes % 8 4 - 12 %    Eosinophils % 1 0 - 6 %    Atypical Lymphocytes % 6 (H) 0 - 0 %    Absolute Neutrophils 2.41 1.81 - 6.82 Thousand/uL    Absolute Lymphocytes 1.24 0.60 - 4.47 Thousand/uL    Absolute Monocytes 0.32 0.00 - 1.22 " Thousand/uL    Absolute Eosinophils 0.04 0.00 - 0.61 Thousand/uL    RBC Morphology Normal     Platelet Estimate Adequate Adequate    Pathology Review Yes (A) No   Leukemia/Lymphoma flow cytometry   Result Value Ref Range    Scan Result SEE WRITTEN REPORT    CLAU COMPREHENSIVE - MYELOID DISORDERS; CLAU - Miscellaneous Test   Result Value Ref Range    Miscellaneous Lab Test Result SEE WRITTEN REPORT    PLASMA CELL ENRICHMENT & HOLD; CLAU - Miscellaneous Test   Result Value Ref Range    Miscellaneous Lab Test Result Specimen on hold    Chromosome analysis, leukemia/lymphoma   Result Value Ref Range    Scan Result SEE WRITTEN REPORT

## 2025-02-17 NOTE — ASSESSMENT & PLAN NOTE
New since 03/2024  B12 deficiency treated w/o improvement   Peripheral flow cytometry, HIV, hepatitis, CRP were unrevealing. He does have unintentional weight loss and complaints of dysphagia. Referred to GI however he never followed up as his dysphagia resolved  Leukopenia is stable  BMBx report is not finalized however I reached out to our pathologist Dr. Fritz who gave verbal report that bone marrow biopsy was largely unremarkable and there were no findings to explain the patients weight loss.   RTC 3m

## 2025-02-18 ENCOUNTER — TELEPHONE (OUTPATIENT)
Dept: HEMATOLOGY ONCOLOGY | Facility: CLINIC | Age: 45
End: 2025-02-18

## 2025-03-07 ENCOUNTER — HOSPITAL ENCOUNTER (OUTPATIENT)
Dept: RADIOLOGY | Age: 45
Discharge: HOME | End: 2025-03-07
Payer: COMMERCIAL

## 2025-03-07 DIAGNOSIS — C31.0: ICD-10-CM

## 2025-03-07 DIAGNOSIS — R63.4 UNINTENDED WEIGHT LOSS: ICD-10-CM

## 2025-03-07 DIAGNOSIS — J34.89 MAXILLARY SINUS MASS: ICD-10-CM

## 2025-03-07 LAB — GLUCOSE SERPL-MCNC: 88 MG/DL (ref 65–140)

## 2025-03-07 PROCEDURE — 78815 PET IMAGE W/CT SKULL-THIGH: CPT

## 2025-03-07 PROCEDURE — A9552 F18 FDG: HCPCS

## 2025-03-07 PROCEDURE — 82948 REAGENT STRIP/BLOOD GLUCOSE: CPT

## 2025-03-07 NOTE — LETTER
[facility]  [address] [place] [address]      March 13, 2025    MRN: [id number]     Phone: [phone]     Dear Mr. Kimbent,    You recently had a(n) Nuclear Medicine performed on 3/7/2025 at  [facility] that was requested by Amanda Wilkins PA-C. The study was reviewed by a radiologist, which is a physician who specializes in medical imaging. The radiologist issued a report describing his or her findings. In that report there was a finding that the radiologist felt warranted further discussion with your health care provider and that discussion would be beneficial to you.      The results were sent to Amanda Wilkins PA-C on 03/10/2025  7:12 AM. We recommend that you contact Amanda Wilkins PA-C at [phone] or set up an appointment to discuss the results of the imaging test. If you have already heard from Amanda Wilkins PA-C regarding the results of your study, you can disregard this letter.     This letter is not meant to alarm you, but intended to encourage you to follow-up on your results with the provider that sent you for the imaging study. In addition, we have enclosed answers to frequently asked questions by other patients who have also received a letter to review results with their health care provider (see page two).      Thank you for choosing [facility] for your medical imaging needs.                                                                                                                                                        FREQUENTLY ASKED QUESTIONS    Why am I receiving this letter?  [place] State Law requires us to notify patients who have findings on imaging exams that may require more testing or follow-up with a health professional within the next 3 months.        How serious is the finding on the imaging test?  This letter is sent to all patients who  may need follow-up or more testing within the next 3 months.  Receiving this letter does not necessarily mean you have a life-threatening imaging finding or disease.  Recommendations in the radiologist’s imaging report are general in nature and it is up to your healthcare provider to say whether those recommendations make sense for your situation.  You are strongly encouraged to talk to your health care provider about the results and ask whether additional steps need to be taken.    Where can I get a copy of the final report for my recent radiology exam?  To get a full copy of the report you can access your records online at [email or web address] or please contact [facility]’s Medical Records Department at [phone] Monday through Friday between 8 am and 6 pm.         What do I need to do now?           Please contact your health care provider who requested the imaging study to discuss what further actions (if any) are needed.  You may have already heard from (your ordering provider) in regard to this test in which case you can disregard this letter.        NOTICE IN ACCORDANCE WITH THE [place] STATE “PATIENT TEST RESULT INFORMATION ACT OF 2018”    You are receiving this notice as a result of a determination by your diagnostic imaging service that further discussions of your test results are warranted and would be beneficial to you.    The complete results of your test or tests have been or will be sent to the health care practitioner that ordered the test or tests. It is recommended that you contact your health care practitioner to discuss your results as soon as possible.

## 2025-03-10 ENCOUNTER — TELEPHONE (OUTPATIENT)
Age: 45
End: 2025-03-10

## 2025-03-10 NOTE — TELEPHONE ENCOUNTER
"Immediate findings on PET scan completed on 3/7 ordered by Amanda Wilkins PA-C.     \"IMPRESSION:     1.  No focal FDG activity characteristic of hypermetabolic malignancy. Specifically, there is no focal FDG activity associated with bilateral maxillary sinus nodules which are better characterized on prior dedicated imaging of the sinuses and remain   nonspecific. Correlation and continued follow-up is recommended as clinically indicated.     2.  Trace air-fluid level in the right maxillary sinus, possibly reflecting sequela of acute sinusitis. Correlation with patient symptomatology is recommended as clinically indicated.     3.  Please see above for details and additional findings.     The study was marked in EPIC for immediate notification.\"  "

## 2025-03-13 ENCOUNTER — RESULTS FOLLOW-UP (OUTPATIENT)
Dept: HEMATOLOGY ONCOLOGY | Facility: CLINIC | Age: 45
End: 2025-03-13

## 2025-04-24 ENCOUNTER — RA CDI HCC (OUTPATIENT)
Dept: OTHER | Facility: HOSPITAL | Age: 45
End: 2025-04-24

## 2025-04-30 ENCOUNTER — OFFICE VISIT (OUTPATIENT)
Dept: FAMILY MEDICINE CLINIC | Facility: CLINIC | Age: 45
End: 2025-04-30
Payer: COMMERCIAL

## 2025-04-30 ENCOUNTER — TELEPHONE (OUTPATIENT)
Age: 45
End: 2025-04-30

## 2025-04-30 VITALS
HEART RATE: 78 BPM | RESPIRATION RATE: 18 BRPM | BODY MASS INDEX: 23.39 KG/M2 | HEIGHT: 70 IN | WEIGHT: 163.4 LBS | OXYGEN SATURATION: 98 % | SYSTOLIC BLOOD PRESSURE: 114 MMHG | DIASTOLIC BLOOD PRESSURE: 80 MMHG

## 2025-04-30 DIAGNOSIS — E78.2 MIXED HYPERLIPIDEMIA: ICD-10-CM

## 2025-04-30 DIAGNOSIS — R73.03 PREDIABETES: ICD-10-CM

## 2025-04-30 DIAGNOSIS — E55.9 VITAMIN D INSUFFICIENCY: ICD-10-CM

## 2025-04-30 DIAGNOSIS — I10 PRIMARY HYPERTENSION: ICD-10-CM

## 2025-04-30 DIAGNOSIS — G47.9 SLEEP DISTURBANCE: Primary | ICD-10-CM

## 2025-04-30 DIAGNOSIS — M54.50 ACUTE LEFT-SIDED LOW BACK PAIN WITHOUT SCIATICA: Primary | ICD-10-CM

## 2025-04-30 DIAGNOSIS — H61.21 IMPACTED CERUMEN OF RIGHT EAR: ICD-10-CM

## 2025-04-30 PROCEDURE — 99214 OFFICE O/P EST MOD 30 MIN: CPT | Performed by: NURSE PRACTITIONER

## 2025-04-30 PROCEDURE — 69210 REMOVE IMPACTED EAR WAX UNI: CPT | Performed by: NURSE PRACTITIONER

## 2025-04-30 RX ORDER — METHYLPREDNISOLONE 4 MG/1
TABLET ORAL
Qty: 21 EACH | Refills: 0 | Status: SHIPPED | OUTPATIENT
Start: 2025-04-30

## 2025-04-30 RX ORDER — METHOCARBAMOL 500 MG/1
500 TABLET, FILM COATED ORAL 3 TIMES DAILY
Qty: 60 TABLET | Refills: 0 | Status: SHIPPED | OUTPATIENT
Start: 2025-04-30 | End: 2025-04-30 | Stop reason: ALTCHOICE

## 2025-04-30 RX ORDER — DOXEPIN 3 MG/1
1 TABLET, FILM COATED ORAL
Qty: 30 TABLET | Refills: 1 | Status: SHIPPED | OUTPATIENT
Start: 2025-04-30

## 2025-04-30 NOTE — ASSESSMENT & PLAN NOTE
Blood pressure controlled in office today.  To obtain blood work prior to next visit.  Lisinopril as prescribed.    Orders:    TSH, 3rd generation with Free T4 reflex; Future    Hemoglobin A1C; Future    Comprehensive metabolic panel; Future    CBC and differential; Future    Lipid panel; Future    Vitamin D 25 hydroxy; Future

## 2025-04-30 NOTE — TELEPHONE ENCOUNTER
Patient script methocarbamol (ROBAXIN) 500 mg tablet is not covered by insurance. Pharmacist gave some preferred alternatives:    Baclofen, Tizanidine, or Cyclobenzaprine. Please advise.

## 2025-04-30 NOTE — ASSESSMENT & PLAN NOTE
Orders:    Hemoglobin A1C; Future    Comprehensive metabolic panel; Future    Lipid panel; Future

## 2025-04-30 NOTE — PROGRESS NOTES
Name: Ralph Broadbent Jr.      : 1980      MRN: [id number]  Encounter Provider: LESLIE Moura  Encounter Date: 2025   Encounter department: [facility] [address]  :  Assessment & Plan  Acute left-sided low back pain without sciatica  Advised heat therapy, range of motion stretches/exercises.  Anti-inflammatories have not been effective for patient.  Will treat with course of steroids and tizanidine.  Advised over-the-counter Biofreeze, IcyHot, Salonpas patches, lidocaine patches.  Offered patient physical therapy at this time, but deferred.  To return for worsening/concerning symptoms.    Orders:    methylPREDNISolone 4 MG tablet therapy pack; Use as directed on package    tiZANidine (ZANAFLEX) 4 mg tablet; Take 1 tablet (4 mg total) by mouth every 8 (eight) hours as needed for muscle spasms    Impacted cerumen of right ear  Advised increase hydration, to clear ears out after showering with moist cloth, to avoid Q-tip use.  Debrox drops as prescribed.    Orders:    Ear cerumen removal    carbamide peroxide (DEBROX) 6.5 % otic solution; Administer 5 drops to the right ear 2 (two) times a day    Primary hypertension  Blood pressure controlled in office today.  To obtain blood work prior to next visit.  Lisinopril as prescribed.    Orders:    TSH, 3rd generation with Free T4 reflex; Future    Hemoglobin A1C; Future    Comprehensive metabolic panel; Future    CBC and differential; Future    Lipid panel; Future    Vitamin D 25 hydroxy; Future    Vitamin D insufficiency    Orders:    Vitamin D 25 hydroxy; Future    Mixed hyperlipidemia    Orders:    Hemoglobin A1C; Future    Comprehensive metabolic panel; Future    Lipid panel; Future    Prediabetes    Orders:    Hemoglobin A1C; Future    Comprehensive metabolic panel; Future          Depression Screening and Follow-up Plan: Patient's depression screening was positive with a PHQ-9 score of 9.   Patient declines  further evaluation by mental health professional and/or medications. Brief counseling provided. Will re-evaluate at next office visit. Patient with underlying depression and was advised to continue current medications as prescribed.     Tobacco Cessation Counseling: Tobacco cessation counseling was not provided. The patient is sincerely urged to quit consumption of tobacco. He is not ready to quit tobacco.       History of Present Illness   Patient presents to the office for 6-month follow-up.  Continues to follow with hematology, ENT, neurology.  Hematology recommended GI workup due to unintended weight loss.  Per patient, feels overall well and feels his weight has maintained and does not wish to see GI at this time.  Patient denies abnormal bleeding, blood in stools, abnormal bruising.    Notes left-sided low back pain.  Symptoms started 3 weeks ago after doing work around the house.  Pain is located to the left lower back, denies radiation of pain.  Using ibuprofen, baclofen, and heating pad with mild relief.  Denies numbness or tingling down extremities, fever, chills, saddle paresthesia, continence of bowel or bladder.    Also notes right ear pain when blowing nose.  Notes increased rhinorrhea, sneezing.  Has stopped Flonase due to getting frequent nosebleeds.  Denies decreased hearing, tinnitus.      Review of Systems   Constitutional:  Negative for chills and fever.   HENT:  Positive for congestion, ear pain, postnasal drip, rhinorrhea and sneezing. Negative for hearing loss, sinus pressure, sinus pain, sore throat and trouble swallowing.    Eyes:  Negative for photophobia and visual disturbance.   Respiratory:  Negative for cough and shortness of breath.    Cardiovascular:  Negative for chest pain and palpitations.   Gastrointestinal:  Negative for nausea and vomiting.   Genitourinary:  Negative for decreased urine volume.   Musculoskeletal:  Positive for back pain. Negative for gait problem.   Skin:   "Negative for color change and rash.   Neurological:  Negative for dizziness, seizures, syncope, weakness, light-headedness, numbness and headaches.   Hematological:  Negative for adenopathy. Does not bruise/bleed easily.   Psychiatric/Behavioral:  Negative for confusion. The patient is not nervous/anxious.        Objective   /80 (BP Location: Left arm, Patient Position: Sitting) Comment: verbalized reading to patient  Pulse 78   Resp 18   Ht 5' 10\" (1.778 m)   Wt 74.1 kg (163 lb 6.4 oz)   SpO2 98%   BMI 23.45 kg/m²      Physical Exam  Vitals reviewed.   Constitutional:       General: He is not in acute distress.     Appearance: Normal appearance. He is not ill-appearing.   HENT:      Head: Normocephalic and atraumatic.      Right Ear: There is impacted cerumen.      Left Ear: Tympanic membrane, ear canal and external ear normal.      Nose: Rhinorrhea present.      Mouth/Throat:      Mouth: Mucous membranes are moist.      Pharynx: Oropharynx is clear.   Eyes:      Pupils: Pupils are equal, round, and reactive to light.   Cardiovascular:      Rate and Rhythm: Normal rate and regular rhythm.      Pulses: Normal pulses.      Heart sounds: Normal heart sounds. No murmur heard.  Pulmonary:      Effort: Pulmonary effort is normal.      Breath sounds: Normal breath sounds.   Musculoskeletal:      Cervical back: Normal range of motion and neck supple. No bony tenderness.      Thoracic back: No bony tenderness.      Lumbar back: Tenderness (to left lower paraspinal muscle) present. No bony tenderness. Negative right straight leg raise test and negative left straight leg raise test.      Right lower leg: No edema.      Left lower leg: No edema.   Skin:     General: Skin is warm and dry.   Neurological:      General: No focal deficit present.      Mental Status: He is alert and oriented to person, place, and time.   Psychiatric:         Mood and Affect: Mood normal.         Behavior: Behavior normal.         Ear " cerumen removal    Date/Time: 4/30/2025 8:23 AM    Performed by: LESLIE Moura  Authorized by: LESLIE Moura  Universal Protocol:  procedure performed by consultantConsent: Verbal consent obtained.  Consent given by: patient  Timeout called at: 4/30/2025 8:22 AM.  Patient understanding: patient states understanding of the procedure being performed  Patient identity confirmed: verbally with patient    Patient location:  Bedside  Procedure details:     Location:  R ear    Procedure type: irrigation with instrumentation      Instrumentation: curette      Approach:  External  Post-procedure details:     Complication:  Bleeding    Hearing quality:  Normal    Patient tolerance of procedure:  Tolerated well, no immediate complications

## 2025-05-19 ENCOUNTER — OFFICE VISIT (OUTPATIENT)
Dept: HEMATOLOGY ONCOLOGY | Facility: CLINIC | Age: 45
End: 2025-05-19
Payer: COMMERCIAL

## 2025-05-19 VITALS
TEMPERATURE: 97.6 F | OXYGEN SATURATION: 99 % | DIASTOLIC BLOOD PRESSURE: 82 MMHG | HEART RATE: 91 BPM | WEIGHT: 163.5 LBS | HEIGHT: 70 IN | BODY MASS INDEX: 23.41 KG/M2 | RESPIRATION RATE: 18 BRPM | SYSTOLIC BLOOD PRESSURE: 126 MMHG

## 2025-05-19 DIAGNOSIS — D72.818 OTHER DECREASED WHITE BLOOD CELL (WBC) COUNT: Primary | ICD-10-CM

## 2025-05-19 DIAGNOSIS — J34.89 MAXILLARY SINUS MASS: ICD-10-CM

## 2025-05-19 DIAGNOSIS — R63.4 UNINTENDED WEIGHT LOSS: ICD-10-CM

## 2025-05-19 DIAGNOSIS — Z15.09 BRCA1 POSITIVE: ICD-10-CM

## 2025-05-19 DIAGNOSIS — Z15.01 BRCA1 POSITIVE: ICD-10-CM

## 2025-05-19 PROCEDURE — 99213 OFFICE O/P EST LOW 20 MIN: CPT | Performed by: PHYSICIAN ASSISTANT

## 2025-05-19 NOTE — PROGRESS NOTES
Name: Ralph Broadbent Jr.      : 1980      MRN: [id number]  Encounter Provider: Amanda Wilkins PA-C  Encounter Date: 2025   Encounter department: [facility] [place]  :  Assessment & Plan  Other decreased white blood cell (WBC) count  New since 2024  B12 deficiency treated w/o improvement   Peripheral flow cytometry, HIV, hepatitis, CRP were unrevealing. He does have unintentional weight loss and complaints of dysphagia. Referred to GI however he never followed up as his dysphagia resolved  BMBx with mildly hypocellular bone marrow 30-40% cellular with maturing, trilineage hematopoiesis.  No hematological malignancy. NGS testing detected several VUS (BRCA1, FANCA P67Q, IKZF M133T) 1, SMC3 R88Q). no pathologic variants.     Orders:    CBC and differential; Future    CBC and differential; Future    Unintended weight loss  20lb weight lost over past 1 year, 5lb weight loss since last office visit. No recent change in meds.   CT CAP 2024 unrevealing although did not include enteric contrast   BMBX non diagnostic   He has nausea daily in AM since starting his Parkisons medication without new abdominal complaints however given unclear etiology for weight loss, recommend GI evaluation for consideration of endoscopy +/- colonoscopy   He has also been following with ENT for chronic congestion, intermittent epistaxis. He was found to have bilateral maxillary sinus lesions most c/w with mucous retention cysts with internal inspissated secretions however there is some abnormal hypoattenuation on sinus MRI and tumor is in differential  PET unremarkable.     I am wondering if weight loss is common in patients with Parkinsons disease based on literature review. Sent message to neurologist for input however no response.     2lb weight gain since prior office visit   Continue nutritional supplements ie protein shake, increased caloric intake etc to try and maintain  his weight  Set up appointment with GI if weight loss continues for endoscopy +/- colonoscopy   Orders:    CBC and differential; Future    Maxillary sinus mass  PET 03/2025: No focal FDG activity characteristic of hypermetabolic malignancy   Trace air-fluid level in the right maxillary sinus, possibly reflecting sequela of acute sinusitis.        BRCA1 positive  BRCA1 missense gene incidentally discovered on bone marrow biopsy..  Patient has family history of breast cancer.  Genetic counseling recommended.  Patient given number to general genetic team at [facility].       I have spent a total time of 34 minutes in caring for this patient on the day of the visit/encounter including Diagnostic results, Documenting in the medical record, Reviewing/placing orders in the medical record (including tests, medications, and/or procedures), and Obtaining or reviewing history   discussing with physician.     Assessment & Plan        Return in about 6 months (around 11/19/2025).    History of Present Illness   Chief Complaint   Patient presents with    Follow-up     History of Present Illness  43-year-old male with history of Parkinson disease who has been referred by PCP for evaluation of leukopenia.     1. Leukopenia   New as of 03/2024 with wbc 4.12 and normal differential. ANDREA, RF, Lyme testing negative. B12 247 and was started on oral b12 therapy. Folate and iron panel were normal. Follow up labs 07/31/2024 show worsening leukopenia now with ANC 1.77.      Patient denies frequent infection, fever, night sweats, lymphadenopathy.  He has had recent unintentional weight loss (see below).  History of liver disease, splenomegaly, history or autoimmune disease.  He had negative HIV and hepatitis testing a couple months ago.  He recently was found to have B12 deficiency was started on oral B12 replacement. Vapes nicotine. Social alcohol use No drug use      2. Weight loss  Over the past year, he has lost 10lb. He has not had not  had any changes in diet or medication over the past. Eating pattern is about the same. No diarrhea.   TSH HIV, Hepatitis C, PSA screen and Hemoccult study was normal.   CT c/a/p performed by PCP 08/02/2024 showed remote granulomatous disease. No evidence of any masses or malignancy. He has a known maxillary sinus cysts which is being evaluated by ENT. Planning to have dedicated CT study completed for further evaluation      He has never had colonoscopy      3. Parksions disease   He was diagnosed at [facility] with Parkinson at the age of 30. He had extensive work up including LP.     Paternal aunt breast cancer, father had lung cancer.    PATH   BMBX 01/22/2025: A-C. BONE MARROW - PERIPHERAL BLOOD, ASPIRATE SMEARS, CORE BIOPSY AND CLOT SECTION - LEFT ILIAC CREST: MILDLY HYPOCELLULAR BONE MARROW (30-40% CELLULAR), WITH MATURING, TRILINEAGE HEMATOPOIESIS, WITH NO INCREASE IN BLASTS OR SIGNIFICANT DYSPLASIA; NO INCREASE IN FIBROSIS AND ADEQUATE STORAGE IRON   Cytogenetics normal karotype   Molecular NGS testing reportedly detects no pathogenic mutations but does detect several variants of unknown clinical significance (VUS)     Pertinent Medical History   01/20/25:   he felt lump in neck after shaving last week. Has had fatigue   No fevers, he has intermittent sweating at night- under armpits and around neck       Review of Systems   Constitutional:  Positive for diaphoresis (at night), fatigue and unexpected weight change. Negative for fever.   HENT:  Positive for nosebleeds (has sinus cysts).         No longer having trouble swallowing   Eyes:  Negative for visual disturbance.   Respiratory:  Negative for shortness of breath.    Cardiovascular:  Negative for chest pain and palpitations.   Gastrointestinal:  Negative for abdominal pain, blood in stool, diarrhea, nausea and vomiting.        No melena     Skin:  Negative for rash.   Neurological:  Negative for headaches.   Hematological:  Positive for  "adenopathy.   All other systems reviewed and are negative.            02/17/25: has continues to lose weight   Has nausea every day for past 10 years per pt. It is not interfering with ability to eat as only occurs when he first wakes up in am. No abdominal pain, hematochezia, melena, change In bowel habits.     05/19/25: gained couple pounds since last office visit. Feeling well. No new complaints.      Review of Systems   Constitutional:  Negative for fever.        Random night sweats, mostly damp. Non-drenching night sweats    Respiratory:  Negative for shortness of breath.    Cardiovascular:  Negative for chest pain.   Gastrointestinal:  Negative for blood in stool and nausea.        No melena   Hematological:  Negative for adenopathy. Does not bruise/bleed easily.   All other systems reviewed and are negative.          Objective   /82 (BP Location: Left arm, Patient Position: Sitting, Cuff Size: Adult)   Pulse 91   Temp 97.6 °F (36.4 °C) (Temporal)   Resp 18   Ht 5' 10\" (1.778 m)   Wt 74.2 kg (163 lb 8 oz)   SpO2 99%   BMI 23.46 kg/m²     Pain Screening:  Pain Score: 0-No pain    Physical Exam  Vitals reviewed.   HENT:      Head: Normocephalic.     Cardiovascular:      Rate and Rhythm: Normal rate and regular rhythm.   Pulmonary:      Effort: Pulmonary effort is normal.      Breath sounds: Normal breath sounds.   Abdominal:      Palpations: Abdomen is soft. There is no splenomegaly.      Tenderness: There is no abdominal tenderness.     Musculoskeletal:      Cervical back: Neck supple.     Skin:     Findings: No rash.     Neurological:      Mental Status: He is alert.       Physical Exam      Results    Labs: I have reviewed the following labs:  Lab Results   Component Value Date/Time    WBC 4.01 (L) 01/22/2025 07:41 AM    RBC 4.97 01/22/2025 07:41 AM    Hemoglobin 15.3 01/22/2025 07:41 AM    Hematocrit 44.0 01/22/2025 07:41 AM    MCV 89 01/22/2025 07:41 AM    MCH 30.8 01/22/2025 07:41 AM    RDW " 12.0 01/22/2025 07:41 AM    Platelets 207 01/22/2025 07:41 AM    Segmented % 58 10/24/2024 08:33 AM    Lymphocytes % 25 01/22/2025 07:41 AM    Lymphocytes % 28 10/24/2024 08:33 AM    Monocytes % 8 01/22/2025 07:41 AM    Monocytes % 11 10/24/2024 08:33 AM    Eosinophils % 1 01/22/2025 07:41 AM    Eosinophils Relative 2 10/24/2024 08:33 AM    Basophils % 1 12/23/2024 07:24 AM    Basophils Relative 1 10/24/2024 08:33 AM    Immature Grans % 0 10/24/2024 08:33 AM    Absolute Neutrophils 2.41 01/22/2025 07:41 AM    Absolute Neutrophils 2.26 10/24/2024 08:33 AM     Lab Results   Component Value Date/Time    Potassium 4.2 09/19/2024 10:39 AM    Chloride 103 09/19/2024 10:39 AM    CO2 28 09/19/2024 10:39 AM    BUN 22 09/19/2024 10:39 AM    Creatinine 0.92 09/19/2024 10:39 AM    Calcium 9.9 09/19/2024 10:39 AM    AST 18 09/19/2024 10:39 AM    ALT 19 09/19/2024 10:39 AM    Alkaline Phosphatase 55 09/19/2024 10:39 AM    Total Protein 7.4 09/19/2024 10:39 AM    Albumin 5.0 09/19/2024 10:39 AM    Total Bilirubin 0.54 09/19/2024 10:39 AM    eGFR 102 09/19/2024 10:39 AM

## 2025-05-19 NOTE — ASSESSMENT & PLAN NOTE
New since 03/2024  B12 deficiency treated w/o improvement   Peripheral flow cytometry, HIV, hepatitis, CRP were unrevealing. He does have unintentional weight loss and complaints of dysphagia. Referred to GI however he never followed up as his dysphagia resolved  BMBx with mildly hypocellular bone marrow 30-40% cellular with maturing, trilineage hematopoiesis.  No hematological malignancy. NGS testing detected several VUS (BRCA1, FANCA P67Q, IKZF M133T) 1, SMC3 R88Q). no pathologic variants.     Orders:    CBC and differential; Future    CBC and differential; Future    
room air

## 2025-05-22 ENCOUNTER — APPOINTMENT (OUTPATIENT)
Dept: LAB | Facility: CLINIC | Age: 45
End: 2025-05-22
Payer: COMMERCIAL

## 2025-05-22 DIAGNOSIS — E78.2 MIXED HYPERLIPIDEMIA: ICD-10-CM

## 2025-05-22 DIAGNOSIS — E55.9 VITAMIN D INSUFFICIENCY: ICD-10-CM

## 2025-05-22 DIAGNOSIS — I10 PRIMARY HYPERTENSION: ICD-10-CM

## 2025-05-22 DIAGNOSIS — D72.818 OTHER DECREASED WHITE BLOOD CELL (WBC) COUNT: ICD-10-CM

## 2025-05-22 DIAGNOSIS — R73.03 PREDIABETES: ICD-10-CM

## 2025-05-22 DIAGNOSIS — R63.4 UNINTENDED WEIGHT LOSS: ICD-10-CM

## 2025-05-22 LAB
BASOPHILS # BLD AUTO: 0.02 THOUSANDS/ÂΜL (ref 0–0.1)
BASOPHILS NFR BLD AUTO: 1 % (ref 0–1)
EOSINOPHIL # BLD AUTO: 0.14 THOUSAND/ÂΜL (ref 0–0.61)
EOSINOPHIL NFR BLD AUTO: 4 % (ref 0–6)
ERYTHROCYTE [DISTWIDTH] IN BLOOD BY AUTOMATED COUNT: 11.7 % (ref 11.6–15.1)
HCT VFR BLD AUTO: 46.3 % (ref 36.5–49.3)
HGB BLD-MCNC: 15.3 G/DL (ref 12–17)
IMM GRANULOCYTES # BLD AUTO: 0 THOUSAND/UL (ref 0–0.2)
IMM GRANULOCYTES NFR BLD AUTO: 0 % (ref 0–2)
LYMPHOCYTES # BLD AUTO: 0.99 THOUSANDS/ÂΜL (ref 0.6–4.47)
LYMPHOCYTES NFR BLD AUTO: 27 % (ref 14–44)
MCH RBC QN AUTO: 30.2 PG (ref 26.8–34.3)
MCHC RBC AUTO-ENTMCNC: 33 G/DL (ref 31.4–37.4)
MCV RBC AUTO: 91 FL (ref 82–98)
MONOCYTES # BLD AUTO: 0.31 THOUSAND/ÂΜL (ref 0.17–1.22)
MONOCYTES NFR BLD AUTO: 9 % (ref 4–12)
NEUTROPHILS # BLD AUTO: 2.19 THOUSANDS/ÂΜL (ref 1.85–7.62)
NEUTS SEG NFR BLD AUTO: 59 % (ref 43–75)
NRBC BLD AUTO-RTO: 0 /100 WBCS
PLATELET # BLD AUTO: 193 THOUSANDS/UL (ref 149–390)
PMV BLD AUTO: 10.3 FL (ref 8.9–12.7)
RBC # BLD AUTO: 5.07 MILLION/UL (ref 3.88–5.62)
WBC # BLD AUTO: 3.65 THOUSAND/UL (ref 4.31–10.16)

## 2025-05-22 PROCEDURE — 86480 TB TEST CELL IMMUN MEASURE: CPT

## 2025-05-22 PROCEDURE — 85025 COMPLETE CBC W/AUTO DIFF WBC: CPT

## 2025-05-22 PROCEDURE — 36415 COLL VENOUS BLD VENIPUNCTURE: CPT

## 2025-05-23 DIAGNOSIS — I10 PRIMARY HYPERTENSION: ICD-10-CM

## 2025-05-23 LAB
GAMMA INTERFERON BACKGROUND BLD IA-ACNC: 0.06 IU/ML
M TB IFN-G BLD-IMP: NEGATIVE
M TB IFN-G CD4+ BCKGRND COR BLD-ACNC: -0.02 IU/ML
M TB IFN-G CD4+ BCKGRND COR BLD-ACNC: 0 IU/ML
MITOGEN IGNF BCKGRD COR BLD-ACNC: 9.94 IU/ML

## 2025-05-25 RX ORDER — LISINOPRIL 20 MG/1
20 TABLET ORAL DAILY
Qty: 90 TABLET | Refills: 1 | Status: SHIPPED | OUTPATIENT
Start: 2025-05-25

## 2025-06-02 DIAGNOSIS — E78.2 MIXED HYPERLIPIDEMIA: ICD-10-CM

## 2025-06-02 DIAGNOSIS — E55.9 VITAMIN D INSUFFICIENCY: ICD-10-CM

## 2025-06-03 RX ORDER — CHOLECALCIFEROL (VITAMIN D3) 25 MCG
1000 TABLET ORAL DAILY
Qty: 90 TABLET | Refills: 0 | Status: SHIPPED | OUTPATIENT
Start: 2025-06-03

## 2025-06-03 RX ORDER — ROSUVASTATIN CALCIUM 10 MG/1
10 TABLET, COATED ORAL DAILY
Qty: 90 TABLET | Refills: 0 | Status: SHIPPED | OUTPATIENT
Start: 2025-06-03

## 2025-06-03 RX ORDER — OMEGA-3-ACID ETHYL ESTERS 1 G/1
2 CAPSULE, LIQUID FILLED ORAL 2 TIMES DAILY
Qty: 360 CAPSULE | Refills: 0 | Status: SHIPPED | OUTPATIENT
Start: 2025-06-03

## 2025-06-05 ENCOUNTER — TELEPHONE (OUTPATIENT)
Age: 45
End: 2025-06-05

## 2025-06-19 DIAGNOSIS — M16.12 OSTEOARTHRITIS OF LEFT HIP, UNSPECIFIED OSTEOARTHRITIS TYPE: ICD-10-CM

## 2025-06-20 RX ORDER — MELOXICAM 15 MG/1
15 TABLET ORAL DAILY
Qty: 30 TABLET | Refills: 5 | Status: SHIPPED | OUTPATIENT
Start: 2025-06-20

## 2025-07-16 ENCOUNTER — OFFICE VISIT (OUTPATIENT)
Dept: FAMILY MEDICINE CLINIC | Facility: CLINIC | Age: 45
End: 2025-07-16
Payer: COMMERCIAL

## 2025-07-16 ENCOUNTER — APPOINTMENT (OUTPATIENT)
Dept: RADIOLOGY | Facility: CLINIC | Age: 45
End: 2025-07-16
Payer: COMMERCIAL

## 2025-07-16 VITALS
RESPIRATION RATE: 16 BRPM | HEART RATE: 106 BPM | HEIGHT: 70 IN | BODY MASS INDEX: 23.48 KG/M2 | WEIGHT: 164 LBS | OXYGEN SATURATION: 97 % | TEMPERATURE: 97.5 F | SYSTOLIC BLOOD PRESSURE: 140 MMHG | DIASTOLIC BLOOD PRESSURE: 80 MMHG

## 2025-07-16 DIAGNOSIS — M25.552 LEFT HIP PAIN: ICD-10-CM

## 2025-07-16 DIAGNOSIS — I10 PRIMARY HYPERTENSION: ICD-10-CM

## 2025-07-16 DIAGNOSIS — M25.552 LEFT HIP PAIN: Primary | ICD-10-CM

## 2025-07-16 PROCEDURE — 73503 X-RAY EXAM HIP UNI 4/> VIEWS: CPT

## 2025-07-16 PROCEDURE — G2211 COMPLEX E/M VISIT ADD ON: HCPCS | Performed by: NURSE PRACTITIONER

## 2025-07-16 PROCEDURE — 99213 OFFICE O/P EST LOW 20 MIN: CPT | Performed by: NURSE PRACTITIONER

## 2025-07-16 RX ORDER — METHYLPREDNISOLONE 4 MG/1
TABLET ORAL
Qty: 21 EACH | Refills: 0 | Status: SHIPPED | OUTPATIENT
Start: 2025-07-16

## 2025-07-16 NOTE — ASSESSMENT & PLAN NOTE
Blood pressure controlled in office today.  Continue on lisinopril as prescribed.  To obtain blood work prior to next visit.    Orders:    CBC and differential; Future

## 2025-07-16 NOTE — PROGRESS NOTES
"Name: Ralph Broadbent Jr.      : 1980      MRN: [id number]  Encounter Provider: LESLIE Moura  Encounter Date: 2025   Encounter department: [facility] [address]  :  Assessment & Plan  Left hip pain  Advised patient continue with heat therapy, to engage in daily stretching/range of motion exercises.  Medrol Dose pack as prescribed.  To obtain x-ray as ordered.    Orders:    XR hip/pelv 4+ vw left if performed; Future    methylPREDNISolone 4 MG tablet therapy pack; Use as directed on package    Primary hypertension  Blood pressure controlled in office today.  Continue on lisinopril as prescribed.  To obtain blood work prior to next visit.    Orders:    CBC and differential; Future           History of Present Illness   Patient presents to the office for evaluation of left hip pain.  Symptoms began a month ago.  He denies recent fall or injury.  Notes pain as a \"dull, constant\" pain.  Pain is worse at night.  Is also exacerbated by walking and bending.  Has been using lidocaine patches, Aleve, heat, meloxicam with not much relief.  Patient denies inability to ambulate, weakness in, gait disturbances.      Review of Systems   Constitutional:  Negative for chills and fever.   Respiratory:  Negative for chest tightness and shortness of breath.    Cardiovascular:  Negative for chest pain, palpitations and leg swelling.   Gastrointestinal:  Negative for abdominal pain and constipation.   Genitourinary:  Negative for decreased urine volume.   Musculoskeletal:  Positive for arthralgias. Negative for gait problem and joint swelling.   Skin:  Negative for color change and rash.   Neurological:  Negative for dizziness, weakness, light-headedness and headaches.   Hematological:  Negative for adenopathy.   Psychiatric/Behavioral:  Negative for dysphoric mood. The patient is not nervous/anxious.        Objective   /80 (Cuff Size: Standard)   Pulse (!) 106   Temp " "97.5 °F (36.4 °C)   Resp 16   Ht 5' 10\" (1.778 m)   Wt 74.4 kg (164 lb)   SpO2 97%   BMI 23.53 kg/m²      Physical Exam  Vitals reviewed.   Constitutional:       General: He is not in acute distress.     Appearance: Normal appearance. He is not ill-appearing.   HENT:      Head: Normocephalic and atraumatic.      Right Ear: External ear normal.      Left Ear: External ear normal.      Nose: Nose normal.      Mouth/Throat:      Mouth: Mucous membranes are moist.      Pharynx: Oropharynx is clear.     Cardiovascular:      Rate and Rhythm: Normal rate and regular rhythm.      Pulses: Normal pulses.      Heart sounds: Normal heart sounds. No murmur heard.  Pulmonary:      Effort: Pulmonary effort is normal.      Breath sounds: Normal breath sounds.     Musculoskeletal:      Cervical back: Normal range of motion and neck supple.      Right hip: Normal.      Left hip: Tenderness present. Decreased range of motion. Decreased strength.     Skin:     General: Skin is warm and dry.     Neurological:      General: No focal deficit present.      Mental Status: He is alert and oriented to person, place, and time.     Psychiatric:         Mood and Affect: Mood normal.         Behavior: Behavior normal.         "

## 2025-07-16 NOTE — PATIENT INSTRUCTIONS
Patient Education     Hip Bursitis Exercises   About this topic   A bursa is a small, fluid-filled sac. It acts as a cushion between your bone and tendon. A tendon is a thick band that attaches your muscle to the bone. Bursae help the tendons glide and let your joints move easier. In the hip, there are three sets of bursae. There is one around the outer donell part of your hip joint. It is called the greater trochanteric bursae. Another set of bursae is in front of your hip near the groin area. These are called iliopsoas bursae. The last bursae is the ischial bursae. It covers the bones in your pelvis that you sit on. These bursae can get swollen and hurt. This problem is called hip bursitis. Exercises can help make this problem better.  General   Before starting with a program, ask your doctor if you are healthy enough to do these exercises. Your doctor may have you work with a  or physical therapist to make a safe exercise program to meet your needs.  Stretching Exercises   Stretching exercises keep your muscles flexible. They also stop them from getting tight. Start by doing each of these stretches 2 to 3 times. In order for your body to make changes, you will need to hold these stretches for 20 to 30 seconds. Try to do the stretches 2 to 3 times each day. Do all exercises slowly.  Single knee to chest stretches ? Lie on your back. Pull one knee towards your chest until you feel a stretch in your lower back and buttock area. Repeat with the other knee. If you have knee problems, pull your knee up by grabbing the back of your thigh instead of the front of your knee. You can also do this exercise by grabbing both knees at the same time.  Deep hip stretches lying down ? Lie on your back and bend one knee, keeping that foot flat on the floor. Cross the other leg over your knee. Pull the bottom leg towards your chest until you feel a stretch in the other buttock. Repeat using the opposite leg as the bottom  leg.  Hamstring stretches on back ? Lie on your back with both knees bent and feet flat on the floor. Grab the back of your left thigh. Straighten your knee until you feel a stretch at the back of your thigh. Now, pull your toes down towards your head. Repeat on the other leg.  Iliotibial band stretches:  To stretch the left IT band: Stand up with your right leg crossed over the left one. Try to point the toes of the feet towards each other. Your toes should almost be touching. This is a very awkward position. Lean your upper body towards the right while bending your right knee. You should feel a stretch near the left hip. Hold and repeat.  To stretch the right IT band: Stand up with your left leg crossed over the right one. Try to point the toes of the feet towards each other. Your toes should almost be touching. This is a very awkward position. Lean your upper body towards the left while bending your left knee. You should feel a stretch near the right hip. Hold and repeat.  Strengthening Exercises   Strengthening exercises keep your muscles firm and strong. Start by repeating each exercise 2 to 3 times. Work up to doing each exercise 10 times. Try to do the exercises 2 to 3 times each day. Do all exercises slowly.  Side leg lifts ? Lie on your side. Have your legs straight and lined up with your back. Lift the top leg up while keeping the knee straight. Do not let your leg go forward. Then, do this exercise on your other side.  Bent knee side leg lifts ? Lie on your side with your painful hip on top. Bend your knees up slightly and have your knees and feet together. Lift your top leg up while keeping your feet together. Lower your leg back down and repeat.               What will the results be?   Less pain and swelling  Better range of motion  Increased strength  Easier to walk and do other activities  Helpful tips   Stay active and work out to keep your muscles strong and flexible.  Keep a healthy weight to  avoid putting too much stress on your spine. Eat a healthy diet to keep your muscles healthy.  Be sure you do not hold your breath when exercising. This can raise your blood pressure. If you tend to hold your breath, try counting out loud when exercising. If any exercise bothers you, stop right away.  Always warm up before stretching. Heated muscles stretch much easier than cool muscles. Stretching cool muscles can lead to injury.  Try walking or cycling at an easy pace for a few minutes to warm up your muscles. Do this again after exercising.  Never bounce when doing stretches.  Doing exercises before a meal may be a good way to get into a routine.  After exercising, it is a good idea to use ice. Place an ice pack or a bag of frozen peas wrapped in a towel over the painful part. Never put ice right on the skin. Do not leave the ice on more than 10 to 15 minutes at a time. Ice after activity may help decrease pain and swelling. Never ice before stretching.  Exercise may be slightly uncomfortable, but you should not have sharp pains. If you do get sharp pains, stop what you are doing. If the sharp pains continue, call your doctor.  Avoid sitting on hard surfaces and use a cushion.  Last Reviewed Date   2024-05-09  Consumer Information Use and Disclaimer   This generalized information is a limited summary of diagnosis, treatment, and/or medication information. It is not meant to be comprehensive and should be used as a tool to help the user understand and/or assess potential diagnostic and treatment options. It does NOT include all information about conditions, treatments, medications, side effects, or risks that may apply to a specific patient. It is not intended to be medical advice or a substitute for the medical advice, diagnosis, or treatment of a health care provider based on the health care provider's examination and assessment of a patient’s specific and unique circumstances. Patients must speak with a health  care provider for complete information about their health, medical questions, and treatment options, including any risks or benefits regarding use of medications. This information does not endorse any treatments or medications as safe, effective, or approved for treating a specific patient. [employer] and its affiliates disclaim any warranty or liability relating to this information or the use thereof. The use of this information is governed by the Terms of Use, available at https://www.[employer].com/en/know/clinical-effectiveness-terms   Copyright   Copyright © 2024 [employer] and its affiliates and/or licensors. All rights reserved.

## 2025-07-17 DIAGNOSIS — M51.360 DEGENERATION OF INTERVERTEBRAL DISC OF LUMBAR REGION WITH DISCOGENIC BACK PAIN: Primary | ICD-10-CM

## 2025-07-29 ENCOUNTER — TELEPHONE (OUTPATIENT)
Dept: NEUROLOGY | Facility: CLINIC | Age: 45
End: 2025-07-29

## 2025-08-04 ENCOUNTER — APPOINTMENT (OUTPATIENT)
Dept: LAB | Facility: CLINIC | Age: 45
End: 2025-08-04
Payer: COMMERCIAL

## 2025-08-04 DIAGNOSIS — I10 PRIMARY HYPERTENSION: ICD-10-CM

## 2025-08-04 LAB
25(OH)D3 SERPL-MCNC: 38.9 NG/ML (ref 30–100)
ALBUMIN SERPL BCG-MCNC: 4.6 G/DL (ref 3.5–5)
ALP SERPL-CCNC: 51 U/L (ref 34–104)
ALT SERPL W P-5'-P-CCNC: 19 U/L (ref 7–52)
ANION GAP SERPL CALCULATED.3IONS-SCNC: 9 MMOL/L (ref 4–13)
AST SERPL W P-5'-P-CCNC: 26 U/L (ref 13–39)
BASOPHILS # BLD AUTO: 0.03 THOUSANDS/ÂΜL (ref 0–0.1)
BASOPHILS NFR BLD AUTO: 1 % (ref 0–1)
BILIRUB SERPL-MCNC: 0.96 MG/DL (ref 0.2–1)
BUN SERPL-MCNC: 25 MG/DL (ref 5–25)
CALCIUM SERPL-MCNC: 9.6 MG/DL (ref 8.4–10.2)
CHLORIDE SERPL-SCNC: 105 MMOL/L (ref 96–108)
CHOLEST SERPL-MCNC: 149 MG/DL (ref ?–200)
CO2 SERPL-SCNC: 26 MMOL/L (ref 21–32)
CREAT SERPL-MCNC: 0.9 MG/DL (ref 0.6–1.3)
EOSINOPHIL # BLD AUTO: 0.16 THOUSAND/ÂΜL (ref 0–0.61)
EOSINOPHIL NFR BLD AUTO: 5 % (ref 0–6)
ERYTHROCYTE [DISTWIDTH] IN BLOOD BY AUTOMATED COUNT: 12.3 % (ref 11.6–15.1)
EST. AVERAGE GLUCOSE BLD GHB EST-MCNC: 105 MG/DL
GFR SERPL CREATININE-BSD FRML MDRD: 104 ML/MIN/1.73SQ M
GLUCOSE P FAST SERPL-MCNC: 100 MG/DL (ref 65–99)
HBA1C MFR BLD: 5.3 %
HCT VFR BLD AUTO: 43 % (ref 36.5–49.3)
HDLC SERPL-MCNC: 42 MG/DL
HGB BLD-MCNC: 14.3 G/DL (ref 12–17)
IMM GRANULOCYTES # BLD AUTO: 0.01 THOUSAND/UL (ref 0–0.2)
IMM GRANULOCYTES NFR BLD AUTO: 0 % (ref 0–2)
LDLC SERPL CALC-MCNC: 79 MG/DL (ref 0–100)
LYMPHOCYTES # BLD AUTO: 0.94 THOUSANDS/ÂΜL (ref 0.6–4.47)
LYMPHOCYTES NFR BLD AUTO: 27 % (ref 14–44)
MCH RBC QN AUTO: 30.8 PG (ref 26.8–34.3)
MCHC RBC AUTO-ENTMCNC: 33.3 G/DL (ref 31.4–37.4)
MCV RBC AUTO: 93 FL (ref 82–98)
MONOCYTES # BLD AUTO: 0.35 THOUSAND/ÂΜL (ref 0.17–1.22)
MONOCYTES NFR BLD AUTO: 10 % (ref 4–12)
NEUTROPHILS # BLD AUTO: 1.97 THOUSANDS/ÂΜL (ref 1.85–7.62)
NEUTS SEG NFR BLD AUTO: 57 % (ref 43–75)
NONHDLC SERPL-MCNC: 107 MG/DL
NRBC BLD AUTO-RTO: 0 /100 WBCS
PLATELET # BLD AUTO: 193 THOUSANDS/UL (ref 149–390)
PMV BLD AUTO: 10.3 FL (ref 8.9–12.7)
POTASSIUM SERPL-SCNC: 3.7 MMOL/L (ref 3.5–5.3)
PROT SERPL-MCNC: 7 G/DL (ref 6.4–8.4)
RBC # BLD AUTO: 4.64 MILLION/UL (ref 3.88–5.62)
SODIUM SERPL-SCNC: 140 MMOL/L (ref 135–147)
TRIGL SERPL-MCNC: 138 MG/DL (ref ?–150)
TSH SERPL DL<=0.05 MIU/L-ACNC: 0.88 UIU/ML (ref 0.45–4.5)
WBC # BLD AUTO: 3.46 THOUSAND/UL (ref 4.31–10.16)

## 2025-08-04 PROCEDURE — 80061 LIPID PANEL: CPT

## 2025-08-04 PROCEDURE — 80053 COMPREHEN METABOLIC PANEL: CPT

## 2025-08-04 PROCEDURE — 82306 VITAMIN D 25 HYDROXY: CPT

## 2025-08-04 PROCEDURE — 85025 COMPLETE CBC W/AUTO DIFF WBC: CPT

## 2025-08-04 PROCEDURE — 84443 ASSAY THYROID STIM HORMONE: CPT

## 2025-08-04 PROCEDURE — 83036 HEMOGLOBIN GLYCOSYLATED A1C: CPT

## 2025-08-07 ENCOUNTER — PROCEDURE VISIT (OUTPATIENT)
Dept: NEUROLOGY | Facility: CLINIC | Age: 45
End: 2025-08-07
Payer: COMMERCIAL

## 2025-08-07 VITALS
DIASTOLIC BLOOD PRESSURE: 74 MMHG | HEART RATE: 88 BPM | HEIGHT: 70 IN | BODY MASS INDEX: 23.71 KG/M2 | OXYGEN SATURATION: 98 % | WEIGHT: 165.6 LBS | SYSTOLIC BLOOD PRESSURE: 136 MMHG

## 2025-08-07 DIAGNOSIS — D72.818 OTHER DECREASED WHITE BLOOD CELL (WBC) COUNT: ICD-10-CM

## 2025-08-07 DIAGNOSIS — R53.82 CHRONIC FATIGUE: ICD-10-CM

## 2025-08-07 DIAGNOSIS — G20.A1 PARKINSON'S DISEASE WITHOUT DYSKINESIA OR FLUCTUATING MANIFESTATIONS (HCC): Primary | Chronic | ICD-10-CM

## 2025-08-07 PROCEDURE — G2211 COMPLEX E/M VISIT ADD ON: HCPCS | Performed by: PSYCHIATRY & NEUROLOGY

## 2025-08-07 PROCEDURE — 99214 OFFICE O/P EST MOD 30 MIN: CPT | Performed by: PSYCHIATRY & NEUROLOGY

## 2025-08-12 ENCOUNTER — OFFICE VISIT (OUTPATIENT)
Dept: FAMILY MEDICINE CLINIC | Facility: CLINIC | Age: 45
End: 2025-08-12
Payer: COMMERCIAL